# Patient Record
Sex: MALE | Race: WHITE | NOT HISPANIC OR LATINO
[De-identification: names, ages, dates, MRNs, and addresses within clinical notes are randomized per-mention and may not be internally consistent; named-entity substitution may affect disease eponyms.]

---

## 2017-02-17 ENCOUNTER — TRANSCRIPTION ENCOUNTER (OUTPATIENT)
Age: 62
End: 2017-02-17

## 2017-02-17 ENCOUNTER — INPATIENT (INPATIENT)
Facility: HOSPITAL | Age: 62
LOS: 3 days | Discharge: HOME | DRG: 920 | End: 2017-02-21
Attending: SPECIALIST | Admitting: SPECIALIST
Payer: MEDICARE

## 2017-02-17 VITALS
TEMPERATURE: 98 F | WEIGHT: 199.96 LBS | DIASTOLIC BLOOD PRESSURE: 89 MMHG | SYSTOLIC BLOOD PRESSURE: 155 MMHG | HEART RATE: 74 BPM | RESPIRATION RATE: 18 BRPM | OXYGEN SATURATION: 97 %

## 2017-02-17 DIAGNOSIS — F32.9 MAJOR DEPRESSIVE DISORDER, SINGLE EPISODE, UNSPECIFIED: ICD-10-CM

## 2017-02-17 DIAGNOSIS — K21.9 GASTRO-ESOPHAGEAL REFLUX DISEASE WITHOUT ESOPHAGITIS: ICD-10-CM

## 2017-02-17 DIAGNOSIS — T24.309A BURN OF THIRD DEGREE OF UNSPECIFIED SITE OF UNSPECIFIED LOWER LIMB, EXCEPT ANKLE AND FOOT, INITIAL ENCOUNTER: Chronic | ICD-10-CM

## 2017-02-17 DIAGNOSIS — L03.115 CELLULITIS OF RIGHT LOWER LIMB: ICD-10-CM

## 2017-02-17 DIAGNOSIS — E78.00 PURE HYPERCHOLESTEROLEMIA, UNSPECIFIED: ICD-10-CM

## 2017-02-17 DIAGNOSIS — R63.8 OTHER SYMPTOMS AND SIGNS CONCERNING FOOD AND FLUID INTAKE: ICD-10-CM

## 2017-02-17 DIAGNOSIS — T14.8 OTHER INJURY OF UNSPECIFIED BODY REGION: Chronic | ICD-10-CM
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
## 2017-02-17 DIAGNOSIS — I10 ESSENTIAL (PRIMARY) HYPERTENSION: ICD-10-CM

## 2017-02-17 DIAGNOSIS — Z29.9 ENCOUNTER FOR PROPHYLACTIC MEASURES, UNSPECIFIED: ICD-10-CM

## 2017-02-17 LAB
ALBUMIN SERPL ELPH-MCNC: 3.9 G/DL — SIGNIFICANT CHANGE UP (ref 3.4–5)
ALP SERPL-CCNC: 83 U/L — SIGNIFICANT CHANGE UP (ref 40–120)
ALT FLD-CCNC: 29 U/L — SIGNIFICANT CHANGE UP (ref 12–42)
ANION GAP SERPL CALC-SCNC: 12 MMOL/L — SIGNIFICANT CHANGE UP (ref 9–16)
AST SERPL-CCNC: 15 U/L — SIGNIFICANT CHANGE UP (ref 15–37)
BASOPHILS NFR BLD AUTO: 0.3 % — SIGNIFICANT CHANGE UP (ref 0–2)
BILIRUB SERPL-MCNC: 0.5 MG/DL — SIGNIFICANT CHANGE UP (ref 0.2–1.2)
BUN SERPL-MCNC: 13 MG/DL — SIGNIFICANT CHANGE UP (ref 7–23)
CALCIUM SERPL-MCNC: 9 MG/DL — SIGNIFICANT CHANGE UP (ref 8.5–10.5)
CHLORIDE SERPL-SCNC: 105 MMOL/L — SIGNIFICANT CHANGE UP (ref 96–108)
CO2 SERPL-SCNC: 24 MMOL/L — SIGNIFICANT CHANGE UP (ref 22–31)
CREAT SERPL-MCNC: 0.78 MG/DL — SIGNIFICANT CHANGE UP (ref 0.5–1.3)
EOSINOPHIL NFR BLD AUTO: 2.4 % — SIGNIFICANT CHANGE UP (ref 0–6)
EXTRA BLUE TOP TUBE: SIGNIFICANT CHANGE UP
EXTRA SST TUBE: SIGNIFICANT CHANGE UP
GLUCOSE SERPL-MCNC: 109 MG/DL — HIGH (ref 70–99)
HCT VFR BLD CALC: 45.4 % — SIGNIFICANT CHANGE UP (ref 39–50)
HGB BLD-MCNC: 15.4 G/DL — SIGNIFICANT CHANGE UP (ref 13–17)
LYMPHOCYTES # BLD AUTO: 22.2 % — SIGNIFICANT CHANGE UP (ref 13–44)
MCHC RBC-ENTMCNC: 31.4 PG — SIGNIFICANT CHANGE UP (ref 27–34)
MCHC RBC-ENTMCNC: 33.9 G/DL — SIGNIFICANT CHANGE UP (ref 32–36)
MCV RBC AUTO: 92.5 FL — SIGNIFICANT CHANGE UP (ref 80–100)
MONOCYTES NFR BLD AUTO: 10.6 % — SIGNIFICANT CHANGE UP (ref 2–14)
NEUTROPHILS NFR BLD AUTO: 64.5 % — SIGNIFICANT CHANGE UP (ref 43–77)
PLATELET # BLD AUTO: 226 K/UL — SIGNIFICANT CHANGE UP (ref 150–400)
POTASSIUM SERPL-MCNC: 3.9 MMOL/L — SIGNIFICANT CHANGE UP (ref 3.5–5.3)
POTASSIUM SERPL-SCNC: 3.9 MMOL/L — SIGNIFICANT CHANGE UP (ref 3.5–5.3)
PROT SERPL-MCNC: 7.5 G/DL — SIGNIFICANT CHANGE UP (ref 6.4–8.2)
RBC # BLD: 4.91 M/UL — SIGNIFICANT CHANGE UP (ref 4.2–5.8)
RBC # FLD: 14.2 % — SIGNIFICANT CHANGE UP (ref 10.3–16.9)
SODIUM SERPL-SCNC: 141 MMOL/L — SIGNIFICANT CHANGE UP (ref 135–145)
WBC # BLD: 10.8 K/UL — HIGH (ref 3.8–10.5)
WBC # FLD AUTO: 10.8 K/UL — HIGH (ref 3.8–10.5)

## 2017-02-17 PROCEDURE — 99285 EMERGENCY DEPT VISIT HI MDM: CPT

## 2017-02-17 PROCEDURE — 93971 EXTREMITY STUDY: CPT | Mod: 26,RT

## 2017-02-17 RX ORDER — HEPARIN SODIUM 5000 [USP'U]/ML
5000 INJECTION INTRAVENOUS; SUBCUTANEOUS EVERY 8 HOURS
Qty: 0 | Refills: 0 | Status: DISCONTINUED | OUTPATIENT
Start: 2017-02-17 | End: 2017-02-21

## 2017-02-17 RX ORDER — CEFAZOLIN SODIUM 1 G
1000 VIAL (EA) INJECTION EVERY 8 HOURS
Qty: 0 | Refills: 0 | Status: DISCONTINUED | OUTPATIENT
Start: 2017-02-17 | End: 2017-02-21

## 2017-02-17 RX ORDER — TRAZODONE HCL 50 MG
100 TABLET ORAL AT BEDTIME
Qty: 0 | Refills: 0 | Status: DISCONTINUED | OUTPATIENT
Start: 2017-02-17 | End: 2017-02-21

## 2017-02-17 RX ORDER — PANTOPRAZOLE SODIUM 20 MG/1
40 TABLET, DELAYED RELEASE ORAL
Qty: 0 | Refills: 0 | Status: DISCONTINUED | OUTPATIENT
Start: 2017-02-17 | End: 2017-02-21

## 2017-02-17 RX ORDER — ATORVASTATIN CALCIUM 80 MG/1
10 TABLET, FILM COATED ORAL AT BEDTIME
Qty: 0 | Refills: 0 | Status: DISCONTINUED | OUTPATIENT
Start: 2017-02-17 | End: 2017-02-21

## 2017-02-17 RX ORDER — DOXAZOSIN MESYLATE 4 MG
2 TABLET ORAL AT BEDTIME
Qty: 0 | Refills: 0 | Status: DISCONTINUED | OUTPATIENT
Start: 2017-02-17 | End: 2017-02-21

## 2017-02-17 RX ORDER — DIVALPROEX SODIUM 500 MG/1
500 TABLET, DELAYED RELEASE ORAL EVERY 8 HOURS
Qty: 0 | Refills: 0 | Status: DISCONTINUED | OUTPATIENT
Start: 2017-02-17 | End: 2017-02-21

## 2017-02-17 RX ORDER — LISINOPRIL 2.5 MG/1
5 TABLET ORAL DAILY
Qty: 0 | Refills: 0 | Status: DISCONTINUED | OUTPATIENT
Start: 2017-02-17 | End: 2017-02-21

## 2017-02-17 RX ORDER — OXYCODONE HYDROCHLORIDE 5 MG/1
1 TABLET ORAL
Qty: 0 | Refills: 0 | DISCHARGE
Start: 2017-02-17

## 2017-02-17 RX ORDER — FAMOTIDINE 10 MG/ML
20 INJECTION INTRAVENOUS AT BEDTIME
Qty: 0 | Refills: 0 | Status: DISCONTINUED | OUTPATIENT
Start: 2017-02-17 | End: 2017-02-21

## 2017-02-17 RX ORDER — CEFAZOLIN SODIUM 1 G
1000 VIAL (EA) INJECTION ONCE
Qty: 0 | Refills: 0 | Status: COMPLETED | OUTPATIENT
Start: 2017-02-17 | End: 2017-02-17

## 2017-02-17 RX ORDER — CEPHALEXIN 500 MG
1 CAPSULE ORAL
Qty: 20 | Refills: 0
Start: 2017-02-17 | End: 2017-02-27

## 2017-02-17 RX ORDER — ALLOPURINOL 300 MG
300 TABLET ORAL DAILY
Qty: 0 | Refills: 0 | Status: DISCONTINUED | OUTPATIENT
Start: 2017-02-17 | End: 2017-02-21

## 2017-02-17 RX ADMIN — Medication 100 MILLIGRAM(S): at 13:04

## 2017-02-17 RX ADMIN — Medication 100 MILLIGRAM(S): at 21:27

## 2017-02-17 RX ADMIN — HEPARIN SODIUM 5000 UNIT(S): 5000 INJECTION INTRAVENOUS; SUBCUTANEOUS at 21:27

## 2017-02-17 RX ADMIN — PANTOPRAZOLE SODIUM 40 MILLIGRAM(S): 20 TABLET, DELAYED RELEASE ORAL at 19:07

## 2017-02-17 RX ADMIN — FAMOTIDINE 20 MILLIGRAM(S): 10 INJECTION INTRAVENOUS at 21:27

## 2017-02-17 RX ADMIN — Medication 300 MILLIGRAM(S): at 19:08

## 2017-02-17 RX ADMIN — HEPARIN SODIUM 5000 UNIT(S): 5000 INJECTION INTRAVENOUS; SUBCUTANEOUS at 15:06

## 2017-02-17 RX ADMIN — ATORVASTATIN CALCIUM 10 MILLIGRAM(S): 80 TABLET, FILM COATED ORAL at 21:27

## 2017-02-17 RX ADMIN — DIVALPROEX SODIUM 500 MILLIGRAM(S): 500 TABLET, DELAYED RELEASE ORAL at 19:07

## 2017-02-17 RX ADMIN — LISINOPRIL 5 MILLIGRAM(S): 2.5 TABLET ORAL at 15:06

## 2017-02-17 RX ADMIN — Medication 2 MILLIGRAM(S): at 22:01
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
## 2017-02-17 NOTE — DISCHARGE NOTE ADULT - MEDICATION SUMMARY - MEDICATIONS TO TAKE
I will START or STAY ON the medications listed below when I get home from the hospital:    acetaminophen-oxyCODONE 325 mg-5 mg oral tablet  -- 1 tab(s) by mouth every 6 hours, As needed, Severe Pain (7 - 10)  -- Indication: For Pain    lisinopril 5 mg oral tablet  -- 1 tab(s) by mouth once a day  -- Indication: For Hypertension    doxazosin 2 mg oral tablet  -- 1 tab(s) by mouth once a day  -- Indication: For BPH    divalproex sodium 500 mg oral delayed release tablet  -- 1 tab(s) by mouth 3 times a day  -- Indication: For Depression    traZODone 100 mg oral tablet  --  by mouth 2 times a day  -- Indication: For Depression    allopurinol 300 mg oral tablet  -- 1 tab(s) by mouth once a day  -- Indication: For Gout    simvastatin 10 mg oral tablet  -- 1 tab(s) by mouth once a day (at bedtime)  -- Indication: For Hypercholesteremia    Keflex 750 mg oral capsule  -- 1 cap(s) by mouth 2 times a day  -- Finish all this medication unless otherwise directed by prescriber.    -- Indication: For CELLULITIS OF LEG    hydroCHLOROthiazide 25 mg oral tablet  -- 1 tab(s) by mouth once a day  -- Indication: For Hypertension    raNITIdine 150 mg oral capsule  -- 1 cap(s) by mouth once a day  -- Indication: For GERD (gastroesophageal reflux disease)    omeprazole 20 mg oral delayed release capsule  -- 1 cap(s) by mouth once a day  -- Indication: For GERD (gastroesophageal reflux disease)
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
## 2017-02-17 NOTE — DISCHARGE NOTE ADULT - CARE PLAN
Principal Discharge DX:	Cellulitis of leg, right  Goal:	eliminate infection  Instructions for follow-up, activity and diet:	please take your antibiotics as directed and followup outpatient with your primary care doctor. Principal Discharge DX:	Cellulitis of leg, right  Goal:	eliminate infection- Continue antibiotics  Instructions for follow-up, activity and diet:	please take your antibiotics as directed and followup outpatient with your primary care doctor.  You have an appointment on Friday with Dr. Car.

## 2017-02-17 NOTE — ED ADULT TRIAGE NOTE - CHIEF COMPLAINT QUOTE
LLE redness and swelling x 4 days.  Sent in by Dr. Pacheco.  Denies sob, chest pain, fever, chills, n/v/d.

## 2017-02-17 NOTE — DISCHARGE NOTE ADULT - CARE PROVIDER_API CALL
Shaye Car), Cardiovascular Medicine  [address] [place] [address]  Phone: [phone]  Fax: [phone]
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
## 2017-02-17 NOTE — ED PROVIDER NOTE - PROGRESS NOTE DETAILS
Pt w/o change in hr after iv metoprolol; po held and pt given cardizem w transient drop in bp and no sig change in hr.  BP improved w bolus.  Will start cardizem gtt and reassess; pt discussed w cardiology fellow who recommended cardizem gtt and switch to digoxin if bp low.  Pt's cardiologist Dr Rene myers to discuss. Will hold on heparin until discuss w him. Pt discussed w Dr Car - agrees w plan for labs, abx, doppler and pt tba to her for further iv abx.

## 2017-02-17 NOTE — ED ADULT NURSE NOTE - PMH
Depression    GERD (gastroesophageal reflux disease)    Gout    HTN (hypertension)    Hypercholesteremia

## 2017-02-17 NOTE — DISCHARGE NOTE ADULT - PATIENT PORTAL LINK FT
“You can access the [facility] Patient Portal, offered by [facility], by registering with the following website: [email or web address]”

## 2017-02-17 NOTE — ED PROVIDER NOTE - MUSCULOSKELETAL, MLM
rle 2+ edema, warm, erythema, ttp, no drainage noted, dp 1+ bilat, lle nl; Spine appears normal, range of motion is not limited, no muscle or joint tenderness
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
## 2017-02-17 NOTE — H&P ADULT. - EXTREMITIES COMMENTS
RLE - swollen, warm, tender, blanching erythema from foot to below knee. scattered flaccid bullae. No purulence  LLE - pitting edema. WWP

## 2017-02-17 NOTE — ED PROVIDER NOTE - MEDICAL DECISION MAKING DETAILS
Pt c/o 2 wk RLE swelling and redness w/o fever, appears like cellulitis on exam - will treat w iv abx and check labs, also poss dvt - will check doppler.  Likely admit for iv abx.
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
## 2017-02-17 NOTE — H&P ADULT. - SUBSTANCE USE COMMENT, PROFILE
former tobacco smoker, quit 27 years ago. former alcoholic, sober for 27 years. no illicit drugs use.

## 2017-02-17 NOTE — DISCHARGE NOTE ADULT - NS AS ACTIVITY OBS
Sex allowed/Driving allowed/Walking-Indoors allowed/Return to Work/School allowed/Walking-Outdoors allowed/Stairs allowed/Bathing allowed/Showering allowed

## 2017-02-17 NOTE — DISCHARGE NOTE ADULT - PLAN OF CARE
eliminate infection please take your antibiotics as directed and followup outpatient with your primary care doctor. eliminate infection- Continue antibiotics please take your antibiotics as directed and followup outpatient with your primary care doctor.  You have an appointment on Friday with Dr. Car.

## 2017-02-17 NOTE — ED PROVIDER NOTE - OBJECTIVE STATEMENT
60 yo male h/o htn, hld, gout c/o RLE pain, swelling, redness, intermittent drainage from varicose areas x 2 wk.  No trauma, immobilization, h/o pe/dvt, cp, palpitations, sob.  Pt saw pmd yest and was instructed to come to ed for eval and iv abx.

## 2017-02-18 LAB
ALBUMIN SERPL ELPH-MCNC: 3.7 G/DL — SIGNIFICANT CHANGE UP (ref 3.4–5)
ALP SERPL-CCNC: 81 U/L — SIGNIFICANT CHANGE UP (ref 40–120)
ALT FLD-CCNC: 29 U/L — SIGNIFICANT CHANGE UP (ref 12–42)
ANION GAP SERPL CALC-SCNC: 9 MMOL/L — SIGNIFICANT CHANGE UP (ref 9–16)
AST SERPL-CCNC: 20 U/L — SIGNIFICANT CHANGE UP (ref 15–37)
BASOPHILS NFR BLD AUTO: 0.5 % — SIGNIFICANT CHANGE UP (ref 0–2)
BILIRUB SERPL-MCNC: 0.7 MG/DL — SIGNIFICANT CHANGE UP (ref 0.2–1.2)
BUN SERPL-MCNC: 12 MG/DL — SIGNIFICANT CHANGE UP (ref 7–23)
CALCIUM SERPL-MCNC: 9.5 MG/DL — SIGNIFICANT CHANGE UP (ref 8.5–10.5)
CHLORIDE SERPL-SCNC: 101 MMOL/L — SIGNIFICANT CHANGE UP (ref 96–108)
CO2 SERPL-SCNC: 30 MMOL/L — SIGNIFICANT CHANGE UP (ref 22–31)
CREAT SERPL-MCNC: 0.93 MG/DL — SIGNIFICANT CHANGE UP (ref 0.5–1.3)
EOSINOPHIL NFR BLD AUTO: 3.8 % — SIGNIFICANT CHANGE UP (ref 0–6)
GLUCOSE SERPL-MCNC: 81 MG/DL — SIGNIFICANT CHANGE UP (ref 70–99)
HBA1C BLD-MCNC: 5.6 % — SIGNIFICANT CHANGE UP (ref 4.8–5.6)
HCT VFR BLD CALC: 46.2 % — SIGNIFICANT CHANGE UP (ref 39–50)
HGB BLD-MCNC: 15.2 G/DL — SIGNIFICANT CHANGE UP (ref 13–17)
LYMPHOCYTES # BLD AUTO: 30.7 % — SIGNIFICANT CHANGE UP (ref 13–44)
MAGNESIUM SERPL-MCNC: 2.2 MG/DL — SIGNIFICANT CHANGE UP (ref 1.6–2.4)
MCHC RBC-ENTMCNC: 30.7 PG — SIGNIFICANT CHANGE UP (ref 27–34)
MCHC RBC-ENTMCNC: 32.9 G/DL — SIGNIFICANT CHANGE UP (ref 32–36)
MCV RBC AUTO: 93.3 FL — SIGNIFICANT CHANGE UP (ref 80–100)
MONOCYTES NFR BLD AUTO: 10 % — SIGNIFICANT CHANGE UP (ref 2–14)
NEUTROPHILS NFR BLD AUTO: 55 % — SIGNIFICANT CHANGE UP (ref 43–77)
PLATELET # BLD AUTO: 225 K/UL — SIGNIFICANT CHANGE UP (ref 150–400)
POTASSIUM SERPL-MCNC: 3.9 MMOL/L — SIGNIFICANT CHANGE UP (ref 3.5–5.3)
POTASSIUM SERPL-SCNC: 3.9 MMOL/L — SIGNIFICANT CHANGE UP (ref 3.5–5.3)
PROT SERPL-MCNC: 7.3 G/DL — SIGNIFICANT CHANGE UP (ref 6.4–8.2)
RBC # BLD: 4.95 M/UL — SIGNIFICANT CHANGE UP (ref 4.2–5.8)
RBC # FLD: 14.4 % — SIGNIFICANT CHANGE UP (ref 10.3–16.9)
SODIUM SERPL-SCNC: 140 MMOL/L — SIGNIFICANT CHANGE UP (ref 135–145)
WBC # BLD: 8.8 K/UL — SIGNIFICANT CHANGE UP (ref 3.8–10.5)
WBC # FLD AUTO: 8.8 K/UL — SIGNIFICANT CHANGE UP (ref 3.8–10.5)

## 2017-02-18 RX ADMIN — ATORVASTATIN CALCIUM 10 MILLIGRAM(S): 80 TABLET, FILM COATED ORAL at 21:30

## 2017-02-18 RX ADMIN — DIVALPROEX SODIUM 500 MILLIGRAM(S): 500 TABLET, DELAYED RELEASE ORAL at 09:23

## 2017-02-18 RX ADMIN — DIVALPROEX SODIUM 500 MILLIGRAM(S): 500 TABLET, DELAYED RELEASE ORAL at 02:04

## 2017-02-18 RX ADMIN — Medication 100 MILLIGRAM(S): at 14:09

## 2017-02-18 RX ADMIN — Medication 100 MILLIGRAM(S): at 05:35

## 2017-02-18 RX ADMIN — HEPARIN SODIUM 5000 UNIT(S): 5000 INJECTION INTRAVENOUS; SUBCUTANEOUS at 05:35

## 2017-02-18 RX ADMIN — PANTOPRAZOLE SODIUM 40 MILLIGRAM(S): 20 TABLET, DELAYED RELEASE ORAL at 07:08

## 2017-02-18 RX ADMIN — Medication 100 MILLIGRAM(S): at 21:31

## 2017-02-18 RX ADMIN — LISINOPRIL 5 MILLIGRAM(S): 2.5 TABLET ORAL at 05:35

## 2017-02-18 RX ADMIN — HEPARIN SODIUM 5000 UNIT(S): 5000 INJECTION INTRAVENOUS; SUBCUTANEOUS at 14:07

## 2017-02-18 RX ADMIN — Medication 300 MILLIGRAM(S): at 14:07

## 2017-02-18 RX ADMIN — HEPARIN SODIUM 5000 UNIT(S): 5000 INJECTION INTRAVENOUS; SUBCUTANEOUS at 21:30

## 2017-02-18 RX ADMIN — Medication 2 MILLIGRAM(S): at 21:30

## 2017-02-18 RX ADMIN — DIVALPROEX SODIUM 500 MILLIGRAM(S): 500 TABLET, DELAYED RELEASE ORAL at 17:28

## 2017-02-18 RX ADMIN — FAMOTIDINE 20 MILLIGRAM(S): 10 INJECTION INTRAVENOUS at 21:30
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
## 2017-02-19 LAB
ANION GAP SERPL CALC-SCNC: 9 MMOL/L — SIGNIFICANT CHANGE UP (ref 9–16)
BUN SERPL-MCNC: 15 MG/DL — SIGNIFICANT CHANGE UP (ref 7–23)
CALCIUM SERPL-MCNC: 9 MG/DL — SIGNIFICANT CHANGE UP (ref 8.5–10.5)
CHLORIDE SERPL-SCNC: 102 MMOL/L — SIGNIFICANT CHANGE UP (ref 96–108)
CO2 SERPL-SCNC: 29 MMOL/L — SIGNIFICANT CHANGE UP (ref 22–31)
CREAT SERPL-MCNC: 1 MG/DL — SIGNIFICANT CHANGE UP (ref 0.5–1.3)
GLUCOSE SERPL-MCNC: 87 MG/DL — SIGNIFICANT CHANGE UP (ref 70–99)
HCT VFR BLD CALC: 47.5 % — SIGNIFICANT CHANGE UP (ref 39–50)
HGB BLD-MCNC: 16.3 G/DL — SIGNIFICANT CHANGE UP (ref 13–17)
MAGNESIUM SERPL-MCNC: 2.2 MG/DL — SIGNIFICANT CHANGE UP (ref 1.6–2.4)
MCHC RBC-ENTMCNC: 31.8 PG — SIGNIFICANT CHANGE UP (ref 27–34)
MCHC RBC-ENTMCNC: 34.3 G/DL — SIGNIFICANT CHANGE UP (ref 32–36)
MCV RBC AUTO: 92.6 FL — SIGNIFICANT CHANGE UP (ref 80–100)
PLATELET # BLD AUTO: 205 K/UL — SIGNIFICANT CHANGE UP (ref 150–400)
POTASSIUM SERPL-MCNC: 3.8 MMOL/L — SIGNIFICANT CHANGE UP (ref 3.5–5.3)
POTASSIUM SERPL-SCNC: 3.8 MMOL/L — SIGNIFICANT CHANGE UP (ref 3.5–5.3)
RBC # BLD: 5.13 M/UL — SIGNIFICANT CHANGE UP (ref 4.2–5.8)
RBC # FLD: 14.3 % — SIGNIFICANT CHANGE UP (ref 10.3–16.9)
SODIUM SERPL-SCNC: 140 MMOL/L — SIGNIFICANT CHANGE UP (ref 135–145)
WBC # BLD: 8.6 K/UL — SIGNIFICANT CHANGE UP (ref 3.8–10.5)
WBC # FLD AUTO: 8.6 K/UL — SIGNIFICANT CHANGE UP (ref 3.8–10.5)

## 2017-02-19 RX ADMIN — ATORVASTATIN CALCIUM 10 MILLIGRAM(S): 80 TABLET, FILM COATED ORAL at 22:16

## 2017-02-19 RX ADMIN — FAMOTIDINE 20 MILLIGRAM(S): 10 INJECTION INTRAVENOUS at 22:17

## 2017-02-19 RX ADMIN — DIVALPROEX SODIUM 500 MILLIGRAM(S): 500 TABLET, DELAYED RELEASE ORAL at 17:49

## 2017-02-19 RX ADMIN — HEPARIN SODIUM 5000 UNIT(S): 5000 INJECTION INTRAVENOUS; SUBCUTANEOUS at 13:44

## 2017-02-19 RX ADMIN — HEPARIN SODIUM 5000 UNIT(S): 5000 INJECTION INTRAVENOUS; SUBCUTANEOUS at 05:27

## 2017-02-19 RX ADMIN — Medication 100 MILLIGRAM(S): at 13:44

## 2017-02-19 RX ADMIN — Medication 300 MILLIGRAM(S): at 12:48

## 2017-02-19 RX ADMIN — Medication 100 MILLIGRAM(S): at 22:16

## 2017-02-19 RX ADMIN — DIVALPROEX SODIUM 500 MILLIGRAM(S): 500 TABLET, DELAYED RELEASE ORAL at 10:12

## 2017-02-19 RX ADMIN — LISINOPRIL 5 MILLIGRAM(S): 2.5 TABLET ORAL at 05:27

## 2017-02-19 RX ADMIN — Medication 100 MILLIGRAM(S): at 05:27

## 2017-02-19 RX ADMIN — Medication 2 MILLIGRAM(S): at 22:16

## 2017-02-19 RX ADMIN — PANTOPRAZOLE SODIUM 40 MILLIGRAM(S): 20 TABLET, DELAYED RELEASE ORAL at 07:22

## 2017-02-19 RX ADMIN — DIVALPROEX SODIUM 500 MILLIGRAM(S): 500 TABLET, DELAYED RELEASE ORAL at 01:27

## 2017-02-19 RX ADMIN — HEPARIN SODIUM 5000 UNIT(S): 5000 INJECTION INTRAVENOUS; SUBCUTANEOUS at 22:17
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
## 2017-02-19 NOTE — PROGRESS NOTE ADULT - SUBJECTIVE AND OBJECTIVE BOX
INTERVAL HPI/OVERNIGHT EVENTS: patient's legs clinical improving with less erythema in site of cellulitis    VITAL SIGNS:  Vital Signs Last 24 Hrs  T(C): 36.3, Max: 36.8 (02-19 @ 05:21)  T(F): 97.3, Max: 98.3 (02-19 @ 05:21)  HR: 63 (56 - 64)  BP: 112/63 (112/63 - 149/82)  BP(mean): --  RR: 17 (17 - 18)  SpO2: 96% (95% - 97%)    PHYSICAL EXAM:    Constitutional: well appearing, WDWN, NAD  HEENT: NCAT, PEERL, sclera non-icteric  Neck: supple, no JVD  Respiratory: CTAB, no wheezes/rales/rhonchi  Cardiovascular: RRR, normal s1/s2, no MRG  Gastrointestinal: soft, NTND, +BS, no guarding, no organomegaly  Extremities: WWP, DP/radial pulses 2+ b/l, RLE - swollen, warm, tender, blanching erythema from foot to mid calf (previously up to knee), less edema than before  Neurological: AAOx 3, responds to commans, moves all extremities, CN 2-12 intact  Musculoskeletal: 5/5 strength throughout    MEDICATIONS  (STANDING):  ceFAZolin   IVPB 1000milliGRAM(s) IV Intermittent every 8 hours  heparin  Injectable 5000Unit(s) SubCutaneous every 8 hours  allopurinol 300milliGRAM(s) Oral daily  pantoprazole    Tablet 40milliGRAM(s) Oral before breakfast  famotidine    Tablet 20milliGRAM(s) Oral at bedtime  doxazosin 2milliGRAM(s) Oral at bedtime  lisinopril 5milliGRAM(s) Oral daily  hydrochlorothiazide   Tablet 25milliGRAM(s) Oral daily  atorvastatin 10milliGRAM(s) Oral at bedtime  diVALproex ER 500milliGRAM(s) Oral every 8 hours    MEDICATIONS  (PRN):  oxyCODONE  5 mG/acetaminophen 325 mG 1Tablet(s) Oral every 6 hours PRN Severe Pain (7 - 10)  traZODone 100milliGRAM(s) Oral at bedtime PRN Insomnia      Allergies    Haldol (Rash)    Intolerances        LABS:                        16.3   8.6   )-----------( 205      ( 19 Feb 2017 06:33 )             47.5     19 Feb 2017 06:36    140    |  102    |  15     ----------------------------<  87     3.8     |  29     |  1.00     Ca    9.0        19 Feb 2017 06:36  Mg     2.2       19 Feb 2017 06:36    TPro  7.3    /  Alb  3.7    /  TBili  0.7    /  DBili  x      /  AST  20     /  ALT  29     /  AlkPhos  81     18 Feb 2017 07:47          RADIOLOGY & ADDITIONAL TESTS:
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
## 2017-02-20 RX ORDER — ACETAMINOPHEN 500 MG
650 TABLET ORAL EVERY 6 HOURS
Qty: 0 | Refills: 0 | Status: DISCONTINUED | OUTPATIENT
Start: 2017-02-20 | End: 2017-02-21

## 2017-02-20 RX ADMIN — Medication 100 MILLIGRAM(S): at 21:48

## 2017-02-20 RX ADMIN — Medication 100 MILLIGRAM(S): at 12:55

## 2017-02-20 RX ADMIN — FAMOTIDINE 20 MILLIGRAM(S): 10 INJECTION INTRAVENOUS at 21:48

## 2017-02-20 RX ADMIN — PANTOPRAZOLE SODIUM 40 MILLIGRAM(S): 20 TABLET, DELAYED RELEASE ORAL at 06:07

## 2017-02-20 RX ADMIN — Medication 300 MILLIGRAM(S): at 12:55

## 2017-02-20 RX ADMIN — HEPARIN SODIUM 5000 UNIT(S): 5000 INJECTION INTRAVENOUS; SUBCUTANEOUS at 21:48

## 2017-02-20 RX ADMIN — Medication 100 MILLIGRAM(S): at 05:56

## 2017-02-20 RX ADMIN — HEPARIN SODIUM 5000 UNIT(S): 5000 INJECTION INTRAVENOUS; SUBCUTANEOUS at 14:59

## 2017-02-20 RX ADMIN — ATORVASTATIN CALCIUM 10 MILLIGRAM(S): 80 TABLET, FILM COATED ORAL at 21:48

## 2017-02-20 RX ADMIN — DIVALPROEX SODIUM 500 MILLIGRAM(S): 500 TABLET, DELAYED RELEASE ORAL at 09:44

## 2017-02-20 RX ADMIN — HEPARIN SODIUM 5000 UNIT(S): 5000 INJECTION INTRAVENOUS; SUBCUTANEOUS at 05:55

## 2017-02-20 RX ADMIN — DIVALPROEX SODIUM 500 MILLIGRAM(S): 500 TABLET, DELAYED RELEASE ORAL at 17:53

## 2017-02-20 RX ADMIN — LISINOPRIL 5 MILLIGRAM(S): 2.5 TABLET ORAL at 05:55

## 2017-02-20 RX ADMIN — Medication 2 MILLIGRAM(S): at 21:48

## 2017-02-20 RX ADMIN — DIVALPROEX SODIUM 500 MILLIGRAM(S): 500 TABLET, DELAYED RELEASE ORAL at 01:25
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
## 2017-02-21 VITALS
OXYGEN SATURATION: 96 % | SYSTOLIC BLOOD PRESSURE: 137 MMHG | DIASTOLIC BLOOD PRESSURE: 78 MMHG | HEART RATE: 85 BPM | TEMPERATURE: 98 F | RESPIRATION RATE: 18 BRPM

## 2017-02-21 LAB
ANION GAP SERPL CALC-SCNC: 9 MMOL/L — SIGNIFICANT CHANGE UP (ref 9–16)
BUN SERPL-MCNC: 17 MG/DL — SIGNIFICANT CHANGE UP (ref 7–23)
CALCIUM SERPL-MCNC: 9.5 MG/DL — SIGNIFICANT CHANGE UP (ref 8.5–10.5)
CHLORIDE SERPL-SCNC: 100 MMOL/L — SIGNIFICANT CHANGE UP (ref 96–108)
CO2 SERPL-SCNC: 29 MMOL/L — SIGNIFICANT CHANGE UP (ref 22–31)
CREAT SERPL-MCNC: 0.86 MG/DL — SIGNIFICANT CHANGE UP (ref 0.5–1.3)
GLUCOSE SERPL-MCNC: 95 MG/DL — SIGNIFICANT CHANGE UP (ref 70–99)
HCT VFR BLD CALC: 48.1 % — SIGNIFICANT CHANGE UP (ref 39–50)
HGB BLD-MCNC: 16 G/DL — SIGNIFICANT CHANGE UP (ref 13–17)
MAGNESIUM SERPL-MCNC: 2 MG/DL — SIGNIFICANT CHANGE UP (ref 1.6–2.4)
MCHC RBC-ENTMCNC: 30.2 PG — SIGNIFICANT CHANGE UP (ref 27–34)
MCHC RBC-ENTMCNC: 33.3 G/DL — SIGNIFICANT CHANGE UP (ref 32–36)
MCV RBC AUTO: 90.8 FL — SIGNIFICANT CHANGE UP (ref 80–100)
PLATELET # BLD AUTO: 220 K/UL — SIGNIFICANT CHANGE UP (ref 150–400)
POTASSIUM SERPL-MCNC: 3.9 MMOL/L — SIGNIFICANT CHANGE UP (ref 3.5–5.3)
POTASSIUM SERPL-SCNC: 3.9 MMOL/L — SIGNIFICANT CHANGE UP (ref 3.5–5.3)
RBC # BLD: 5.3 M/UL — SIGNIFICANT CHANGE UP (ref 4.2–5.8)
RBC # FLD: 14 % — SIGNIFICANT CHANGE UP (ref 10.3–16.9)
SODIUM SERPL-SCNC: 138 MMOL/L — SIGNIFICANT CHANGE UP (ref 135–145)
WBC # BLD: 9 K/UL — SIGNIFICANT CHANGE UP (ref 3.8–10.5)
WBC # FLD AUTO: 9 K/UL — SIGNIFICANT CHANGE UP (ref 3.8–10.5)

## 2017-02-21 PROCEDURE — 85027 COMPLETE CBC AUTOMATED: CPT

## 2017-02-21 PROCEDURE — 99285 EMERGENCY DEPT VISIT HI MDM: CPT | Mod: 25

## 2017-02-21 PROCEDURE — 93971 EXTREMITY STUDY: CPT

## 2017-02-21 PROCEDURE — 83735 ASSAY OF MAGNESIUM: CPT

## 2017-02-21 PROCEDURE — 80048 BASIC METABOLIC PNL TOTAL CA: CPT

## 2017-02-21 PROCEDURE — 36415 COLL VENOUS BLD VENIPUNCTURE: CPT

## 2017-02-21 PROCEDURE — 87040 BLOOD CULTURE FOR BACTERIA: CPT

## 2017-02-21 PROCEDURE — 97161 PT EVAL LOW COMPLEX 20 MIN: CPT

## 2017-02-21 PROCEDURE — 80053 COMPREHEN METABOLIC PANEL: CPT

## 2017-02-21 PROCEDURE — 83036 HEMOGLOBIN GLYCOSYLATED A1C: CPT

## 2017-02-21 PROCEDURE — 85025 COMPLETE CBC W/AUTO DIFF WBC: CPT

## 2017-02-21 RX ADMIN — Medication 300 MILLIGRAM(S): at 11:10

## 2017-02-21 RX ADMIN — Medication 100 MILLIGRAM(S): at 12:22

## 2017-02-21 RX ADMIN — LISINOPRIL 5 MILLIGRAM(S): 2.5 TABLET ORAL at 05:44

## 2017-02-21 RX ADMIN — DIVALPROEX SODIUM 500 MILLIGRAM(S): 500 TABLET, DELAYED RELEASE ORAL at 02:05

## 2017-02-21 RX ADMIN — PANTOPRAZOLE SODIUM 40 MILLIGRAM(S): 20 TABLET, DELAYED RELEASE ORAL at 07:42

## 2017-02-21 RX ADMIN — DIVALPROEX SODIUM 500 MILLIGRAM(S): 500 TABLET, DELAYED RELEASE ORAL at 10:44

## 2017-02-21 RX ADMIN — Medication 100 MILLIGRAM(S): at 05:44

## 2017-02-21 RX ADMIN — HEPARIN SODIUM 5000 UNIT(S): 5000 INJECTION INTRAVENOUS; SUBCUTANEOUS at 05:44

## 2017-02-21 NOTE — PHYSICAL THERAPY INITIAL EVALUATION ADULT - ADDITIONAL COMMENTS
Pt. reports living in an elevator building and using a cane vs. single crutch for ambulation prior to admission.

## 2017-02-22 LAB
CULTURE RESULTS: SIGNIFICANT CHANGE UP
CULTURE RESULTS: SIGNIFICANT CHANGE UP
SPECIMEN SOURCE: SIGNIFICANT CHANGE UP
SPECIMEN SOURCE: SIGNIFICANT CHANGE UP

## 2017-02-23 DIAGNOSIS — L03.115 CELLULITIS OF RIGHT LOWER LIMB: ICD-10-CM

## 2017-02-23 DIAGNOSIS — I10 ESSENTIAL (PRIMARY) HYPERTENSION: ICD-10-CM

## 2017-02-23 DIAGNOSIS — Z88.8 ALLERGY STATUS TO OTHER DRUGS, MEDICAMENTS AND BIOLOGICAL SUBSTANCES STATUS: ICD-10-CM

## 2017-02-23 DIAGNOSIS — E78.5 HYPERLIPIDEMIA, UNSPECIFIED: ICD-10-CM

## 2017-02-23 DIAGNOSIS — M79.89 OTHER SPECIFIED SOFT TISSUE DISORDERS: ICD-10-CM

## 2017-02-23 DIAGNOSIS — M10.9 GOUT, UNSPECIFIED: ICD-10-CM

## 2017-02-23 DIAGNOSIS — Z87.891 PERSONAL HISTORY OF NICOTINE DEPENDENCE: ICD-10-CM

## 2017-02-23 DIAGNOSIS — K21.9 GASTRO-ESOPHAGEAL REFLUX DISEASE WITHOUT ESOPHAGITIS: ICD-10-CM

## 2017-02-23 DIAGNOSIS — M21.372 FOOT DROP, LEFT FOOT: ICD-10-CM

## 2017-02-23 DIAGNOSIS — L03.116 CELLULITIS OF LEFT LOWER LIMB: ICD-10-CM

## 2017-02-23 DIAGNOSIS — F32.9 MAJOR DEPRESSIVE DISORDER, SINGLE EPISODE, UNSPECIFIED: ICD-10-CM

## 2017-02-24 PROBLEM — K21.9 GASTRO-ESOPHAGEAL REFLUX DISEASE WITHOUT ESOPHAGITIS: Chronic | Status: ACTIVE | Noted: 2017-02-17

## 2017-02-24 PROBLEM — E78.00 PURE HYPERCHOLESTEROLEMIA, UNSPECIFIED: Chronic | Status: ACTIVE | Noted: 2017-02-17

## 2017-02-24 PROBLEM — F32.9 MAJOR DEPRESSIVE DISORDER, SINGLE EPISODE, UNSPECIFIED: Chronic | Status: ACTIVE | Noted: 2017-02-17

## 2017-02-24 PROBLEM — I10 ESSENTIAL (PRIMARY) HYPERTENSION: Chronic | Status: ACTIVE | Noted: 2017-02-17

## 2017-02-24 PROBLEM — M10.9 GOUT, UNSPECIFIED: Chronic | Status: ACTIVE | Noted: 2017-02-17

## 2017-02-27 DIAGNOSIS — T86.822 SKIN GRAFT (ALLOGRAFT) (AUTOGRAFT) INFECTION: ICD-10-CM

## 2017-02-27 DIAGNOSIS — T85.693A OTHER MECHANICAL COMPLICATION OF ARTIFICIAL SKIN GRAFT AND DECELLULARIZED ALLODERMIS, INITIAL ENCOUNTER: ICD-10-CM

## 2017-03-10 ENCOUNTER — APPOINTMENT (OUTPATIENT)
Dept: VASCULAR SURGERY | Facility: CLINIC | Age: 62
End: 2017-03-10

## 2017-03-24 ENCOUNTER — APPOINTMENT (OUTPATIENT)
Dept: VASCULAR SURGERY | Facility: CLINIC | Age: 62
End: 2017-03-24

## 2017-03-24 VITALS — SYSTOLIC BLOOD PRESSURE: 130 MMHG | DIASTOLIC BLOOD PRESSURE: 80 MMHG | OXYGEN SATURATION: 92 % | HEART RATE: 71 BPM

## 2017-03-24 RX ORDER — SULFAMETHOXAZOLE AND TRIMETHOPRIM 800; 160 MG/1; MG/1
800-160 TABLET ORAL TWICE DAILY
Qty: 28 | Refills: 0 | Status: DISCONTINUED | COMMUNITY
Start: 2017-03-10 | End: 2017-03-24

## 2017-04-07 ENCOUNTER — APPOINTMENT (OUTPATIENT)
Dept: VASCULAR SURGERY | Facility: CLINIC | Age: 62
End: 2017-04-07

## 2017-04-07 RX ORDER — CIPROFLOXACIN HYDROCHLORIDE 500 MG/1
500 TABLET, FILM COATED ORAL DAILY
Qty: 30 | Refills: 0 | Status: DISCONTINUED | COMMUNITY
Start: 2017-03-24 | End: 2017-04-07

## 2017-05-19 ENCOUNTER — APPOINTMENT (OUTPATIENT)
Dept: VASCULAR SURGERY | Facility: CLINIC | Age: 62
End: 2017-05-19

## 2017-05-19 VITALS — SYSTOLIC BLOOD PRESSURE: 149 MMHG | DIASTOLIC BLOOD PRESSURE: 92 MMHG | HEART RATE: 72 BPM | OXYGEN SATURATION: 95 %

## 2018-12-28 ENCOUNTER — APPOINTMENT (OUTPATIENT)
Dept: VASCULAR SURGERY | Facility: CLINIC | Age: 63
End: 2018-12-28
Payer: MEDICARE

## 2018-12-28 PROCEDURE — 99214 OFFICE O/P EST MOD 30 MIN: CPT

## 2019-01-11 ENCOUNTER — OUTPATIENT (OUTPATIENT)
Dept: OUTPATIENT SERVICES | Facility: HOSPITAL | Age: 64
LOS: 1 days | End: 2019-01-11
Payer: MEDICARE

## 2019-01-11 ENCOUNTER — APPOINTMENT (OUTPATIENT)
Dept: VASCULAR SURGERY | Facility: CLINIC | Age: 64
End: 2019-01-11
Payer: MEDICARE

## 2019-01-11 DIAGNOSIS — T24.309A BURN OF THIRD DEGREE OF UNSPECIFIED SITE OF UNSPECIFIED LOWER LIMB, EXCEPT ANKLE AND FOOT, INITIAL ENCOUNTER: Chronic | ICD-10-CM

## 2019-01-11 DIAGNOSIS — L03.116 CELLULITIS OF LEFT LOWER LIMB: ICD-10-CM

## 2019-01-11 DIAGNOSIS — T14.8 OTHER INJURY OF UNSPECIFIED BODY REGION: Chronic | ICD-10-CM

## 2019-01-11 LAB
ALBUMIN SERPL ELPH-MCNC: 4.1 G/DL — SIGNIFICANT CHANGE UP (ref 3.3–5)
ALP SERPL-CCNC: 85 U/L — SIGNIFICANT CHANGE UP (ref 40–120)
ALT FLD-CCNC: 30 U/L — SIGNIFICANT CHANGE UP (ref 10–45)
ANION GAP SERPL CALC-SCNC: 18 MMOL/L — HIGH (ref 5–17)
APTT BLD: 33.8 SEC — SIGNIFICANT CHANGE UP (ref 27.5–36.3)
AST SERPL-CCNC: 20 U/L — SIGNIFICANT CHANGE UP (ref 10–40)
BASOPHILS NFR BLD AUTO: 0.3 % — SIGNIFICANT CHANGE UP (ref 0–2)
BILIRUB SERPL-MCNC: 0.4 MG/DL — SIGNIFICANT CHANGE UP (ref 0.2–1.2)
BUN SERPL-MCNC: 14 MG/DL — SIGNIFICANT CHANGE UP (ref 7–23)
CALCIUM SERPL-MCNC: 9.2 MG/DL — SIGNIFICANT CHANGE UP (ref 8.4–10.5)
CHLORIDE SERPL-SCNC: 103 MMOL/L — SIGNIFICANT CHANGE UP (ref 96–108)
CO2 SERPL-SCNC: 22 MMOL/L — SIGNIFICANT CHANGE UP (ref 22–31)
CREAT SERPL-MCNC: 1.03 MG/DL — SIGNIFICANT CHANGE UP (ref 0.5–1.3)
EOSINOPHIL NFR BLD AUTO: 1.8 % — SIGNIFICANT CHANGE UP (ref 0–6)
GLUCOSE SERPL-MCNC: 99 MG/DL — SIGNIFICANT CHANGE UP (ref 70–99)
HCT VFR BLD CALC: 46 % — SIGNIFICANT CHANGE UP (ref 39–50)
HGB BLD-MCNC: 15 G/DL — SIGNIFICANT CHANGE UP (ref 13–17)
INR BLD: 1.07 — SIGNIFICANT CHANGE UP (ref 0.88–1.16)
LYMPHOCYTES # BLD AUTO: 25 % — SIGNIFICANT CHANGE UP (ref 13–44)
MCHC RBC-ENTMCNC: 29.8 PG — SIGNIFICANT CHANGE UP (ref 27–34)
MCHC RBC-ENTMCNC: 32.6 G/DL — SIGNIFICANT CHANGE UP (ref 32–36)
MCV RBC AUTO: 91.3 FL — SIGNIFICANT CHANGE UP (ref 80–100)
MONOCYTES NFR BLD AUTO: 9.5 % — SIGNIFICANT CHANGE UP (ref 2–14)
NEUTROPHILS NFR BLD AUTO: 63.4 % — SIGNIFICANT CHANGE UP (ref 43–77)
PLATELET # BLD AUTO: 265 K/UL — SIGNIFICANT CHANGE UP (ref 150–400)
POTASSIUM SERPL-MCNC: 4.2 MMOL/L — SIGNIFICANT CHANGE UP (ref 3.5–5.3)
POTASSIUM SERPL-SCNC: 4.2 MMOL/L — SIGNIFICANT CHANGE UP (ref 3.5–5.3)
PROT SERPL-MCNC: 6.4 G/DL — SIGNIFICANT CHANGE UP (ref 6–8.3)
PROTHROM AB SERPL-ACNC: 12.1 SEC — SIGNIFICANT CHANGE UP (ref 10–12.9)
RBC # BLD: 5.04 M/UL — SIGNIFICANT CHANGE UP (ref 4.2–5.8)
RBC # FLD: 14.2 % — SIGNIFICANT CHANGE UP (ref 10.3–16.9)
SODIUM SERPL-SCNC: 143 MMOL/L — SIGNIFICANT CHANGE UP (ref 135–145)
WBC # BLD: 11.3 K/UL — HIGH (ref 3.8–10.5)
WBC # FLD AUTO: 11.3 K/UL — HIGH (ref 3.8–10.5)

## 2019-01-11 PROCEDURE — 93005 ELECTROCARDIOGRAM TRACING: CPT

## 2019-01-11 PROCEDURE — 99213 OFFICE O/P EST LOW 20 MIN: CPT

## 2019-01-11 PROCEDURE — 85025 COMPLETE CBC W/AUTO DIFF WBC: CPT

## 2019-01-11 PROCEDURE — 80053 COMPREHEN METABOLIC PANEL: CPT

## 2019-01-11 PROCEDURE — 85730 THROMBOPLASTIN TIME PARTIAL: CPT

## 2019-01-11 PROCEDURE — 93010 ELECTROCARDIOGRAM REPORT: CPT

## 2019-01-11 PROCEDURE — 85610 PROTHROMBIN TIME: CPT

## 2019-01-17 ENCOUNTER — APPOINTMENT (OUTPATIENT)
Dept: VASCULAR SURGERY | Facility: CLINIC | Age: 64
End: 2019-01-17
Payer: MEDICARE

## 2019-01-17 PROCEDURE — 99213 OFFICE O/P EST LOW 20 MIN: CPT

## 2019-01-17 RX ORDER — CIPROFLOXACIN HYDROCHLORIDE 250 MG/1
250 TABLET, FILM COATED ORAL
Qty: 30 | Refills: 0 | Status: DISCONTINUED | COMMUNITY
Start: 2017-04-07 | End: 2019-01-17

## 2019-01-17 NOTE — REVIEW OF SYSTEMS
[Fever] : no fever [As noted in HPI] : as noted in HPI [Leg Claudication] : no intermittent leg claudication [Lower Ext Edema] : lower extremity edema [As Noted in HPI] : as noted in HPI [Skin Lesions] : skin lesion [Negative] : Heme/Lymph

## 2019-01-17 NOTE — PHYSICAL EXAM
[JVD] : no jugular venous distention  [Normal Breath Sounds] : Normal breath sounds [Normal Heart Sounds] : normal heart sounds [2+] : left 2+ [Ankle Swelling (On Exam)] : present [Ankle Swelling On The Left] : of the left ankle [Ankle Swelling On The Right] : mild [Varicose Veins Of Lower Extremities] : not present [] : not present [Abdomen Tenderness] : ~T ~M No abdominal tenderness [Skin Ulcer] : ulcer [Alert] : alert [Calm] : calm [de-identified] : WN/WD, NAD [de-identified] : NC/AT [de-identified] : LLE: + 1x2 cm ulcer over lower posterior calf, + mild surrounding erythema, clean, no signs of infection

## 2019-01-17 NOTE — ASSESSMENT
[FreeTextEntry1] : 64 yo M with hx of cellulitis of LE  due to a new abrasion to his left posterior calf  when he accidentally dropped a pan while cooking a week ago\par Patient was recommended to have  wound debridement this week in OR, but he noticed that it's better and decided to cancel the procedure.\par Pt was asked to come to see us in the office today.\par LLE ulcer is stable, getting smaller, less edema and surrounding erythema.\par Patient was recommended to  c/w Abx's and local wound care. [Arterial/Venous Disease] : arterial/venous disease [Ulcer Care] : ulcer care

## 2019-01-17 NOTE — HISTORY OF PRESENT ILLNESS
[FreeTextEntry1] : 62 yo M with hx of cellulitis of LE who was seen last week due toe new abrasion to his left posterior calf  when he accidentally dropped a pan while cooking a week ago. Patient was recommended to apply Silvadene to the area and was started on oral Abx's.  He returned few days ago since he noticed and stated that his wound is not healing and after the evaluation we recommended for him to have wound debridement in OR. Patient called few two days later stating that the scab came off from the ulcer and now it's cleared and he thinks it's healing. He asked if we could cancel his procedure. He denies fever, chills.

## 2019-01-18 NOTE — HISTORY OF PRESENT ILLNESS
[FreeTextEntry1] : 62 yo M with hx of cellulitis of LE who was seen last week due toa new abrasion to his left posterior calf  when he accidentally dropped a pan while cooking a week ago. Patient was recommended to apply Silvadene to the area and was started on oral Abx's. Today he returns since he noticed drainage from the wound and he is afraid it is not healing well. He denies fever, chills.

## 2019-01-18 NOTE — ASSESSMENT
[Ulcer Care] : ulcer care [FreeTextEntry1] : 62 yo M with hx of cellulitis of LE who was seen last week due to a new abrasion to his left posterior calf  when he accidentally dropped a pan while cooking a week ago\par Since his ulcer is not healing well and now it drains clear discharge he was recommended to have wound debridement next week in OR.\par Patient agreed and would like to proceed.\par In a mean time c/w Abx's and local wound care.

## 2019-01-18 NOTE — REVIEW OF SYSTEMS
[As noted in HPI] : as noted in HPI [Lower Ext Edema] : lower extremity edema [As Noted in HPI] : as noted in HPI [Skin Lesions] : skin lesion [Negative] : Heme/Lymph [Fever] : no fever [Leg Claudication] : no intermittent leg claudication

## 2019-01-18 NOTE — PHYSICAL EXAM
[Normal Breath Sounds] : Normal breath sounds [Normal Heart Sounds] : normal heart sounds [2+] : left 2+ [Ankle Swelling (On Exam)] : present [Ankle Swelling On The Left] : of the left ankle [Ankle Swelling On The Right] : mild [Skin Ulcer] : ulcer [Alert] : alert [Calm] : calm [JVD] : no jugular venous distention  [Varicose Veins Of Lower Extremities] : not present [] : not present [Abdomen Tenderness] : ~T ~M No abdominal tenderness [de-identified] : WN/WD, NAD [de-identified] : NC/AT [de-identified] : LLE: + 1x2 cm ulcer over lower posterior calf, + mild surrounding erythema, +exudate

## 2019-01-25 ENCOUNTER — APPOINTMENT (OUTPATIENT)
Dept: VASCULAR SURGERY | Facility: CLINIC | Age: 64
End: 2019-01-25
Payer: MEDICARE

## 2019-01-25 PROCEDURE — 99212 OFFICE O/P EST SF 10 MIN: CPT

## 2019-01-25 NOTE — PHYSICAL EXAM
[Normal Breath Sounds] : Normal breath sounds [Normal Heart Sounds] : normal heart sounds [2+] : left 2+ [Ankle Swelling (On Exam)] : present [Ankle Swelling On The Left] : of the left ankle [Ankle Swelling On The Right] : mild [Skin Ulcer] : ulcer [Alert] : alert [Calm] : calm [JVD] : no jugular venous distention  [Varicose Veins Of Lower Extremities] : not present [] : not present [Abdomen Tenderness] : ~T ~M No abdominal tenderness [de-identified] : WN/WD, NAD [de-identified] : NC/AT [de-identified] : LLE: + 1x0.5 cm ulcer over lower posterior calf, almost healed

## 2019-01-25 NOTE — ASSESSMENT
[Arterial/Venous Disease] : arterial/venous disease [Ulcer Care] : ulcer care [FreeTextEntry1] : 62 yo M with hx of cellulitis of LE who was seen last week due toe new abrasion to his left posterior calf  treated w/Silvadene to the area and oral Abx's returns for a f/u visit. \par LLE ulcer is almost healed, and there is less edema.\par Patient was recommended to  c/w  local wound care.\par If ulcer heals no f/u necessary.\par F/u prn.

## 2019-01-25 NOTE — HISTORY OF PRESENT ILLNESS
[FreeTextEntry1] : 62 yo M with hx of cellulitis of LE who was seen last week due toe new abrasion to his left posterior calf  treated w/Silvadene to the area and oral Abx's returns for a f/u visit.  Patient states that his wound is healing well and decreased in size. He denies fever, chills.

## 2019-08-06 ENCOUNTER — RX RENEWAL (OUTPATIENT)
Age: 64
End: 2019-08-06

## 2019-08-12 ENCOUNTER — RX RENEWAL (OUTPATIENT)
Age: 64
End: 2019-08-12

## 2019-08-29 ENCOUNTER — INPATIENT (INPATIENT)
Facility: HOSPITAL | Age: 64
LOS: 4 days | Discharge: HOME | DRG: 603 | End: 2019-09-03
Attending: SURGERY | Admitting: SURGERY
Payer: MEDICARE

## 2019-08-29 VITALS
HEIGHT: 69 IN | RESPIRATION RATE: 17 BRPM | OXYGEN SATURATION: 97 % | WEIGHT: 229.94 LBS | SYSTOLIC BLOOD PRESSURE: 174 MMHG | DIASTOLIC BLOOD PRESSURE: 87 MMHG | HEART RATE: 66 BPM | TEMPERATURE: 98 F

## 2019-08-29 DIAGNOSIS — T24.309A BURN OF THIRD DEGREE OF UNSPECIFIED SITE OF UNSPECIFIED LOWER LIMB, EXCEPT ANKLE AND FOOT, INITIAL ENCOUNTER: Chronic | ICD-10-CM

## 2019-08-29 DIAGNOSIS — T14.8 OTHER INJURY OF UNSPECIFIED BODY REGION: Chronic | ICD-10-CM

## 2019-08-29 LAB
ALBUMIN SERPL ELPH-MCNC: 4 G/DL — SIGNIFICANT CHANGE UP (ref 3.3–5)
ALP SERPL-CCNC: 61 U/L — SIGNIFICANT CHANGE UP (ref 40–120)
ALT FLD-CCNC: 14 U/L — SIGNIFICANT CHANGE UP (ref 10–45)
ANION GAP SERPL CALC-SCNC: 12 MMOL/L — SIGNIFICANT CHANGE UP (ref 5–17)
APTT BLD: 34.8 SEC — SIGNIFICANT CHANGE UP (ref 27.5–36.3)
AST SERPL-CCNC: 16 U/L — SIGNIFICANT CHANGE UP (ref 10–40)
BASOPHILS # BLD AUTO: 0.04 K/UL — SIGNIFICANT CHANGE UP (ref 0–0.2)
BASOPHILS NFR BLD AUTO: 0.4 % — SIGNIFICANT CHANGE UP (ref 0–2)
BILIRUB SERPL-MCNC: 0.3 MG/DL — SIGNIFICANT CHANGE UP (ref 0.2–1.2)
BUN SERPL-MCNC: 17 MG/DL — SIGNIFICANT CHANGE UP (ref 7–23)
CALCIUM SERPL-MCNC: 9 MG/DL — SIGNIFICANT CHANGE UP (ref 8.4–10.5)
CHLORIDE SERPL-SCNC: 108 MMOL/L — SIGNIFICANT CHANGE UP (ref 96–108)
CO2 SERPL-SCNC: 24 MMOL/L — SIGNIFICANT CHANGE UP (ref 22–31)
CREAT SERPL-MCNC: 0.77 MG/DL — SIGNIFICANT CHANGE UP (ref 0.5–1.3)
EOSINOPHIL # BLD AUTO: 0.33 K/UL — SIGNIFICANT CHANGE UP (ref 0–0.5)
EOSINOPHIL NFR BLD AUTO: 3.3 % — SIGNIFICANT CHANGE UP (ref 0–6)
GLUCOSE SERPL-MCNC: 127 MG/DL — HIGH (ref 70–99)
HCT VFR BLD CALC: 46 % — SIGNIFICANT CHANGE UP (ref 39–50)
HGB BLD-MCNC: 14.9 G/DL — SIGNIFICANT CHANGE UP (ref 13–17)
IMM GRANULOCYTES NFR BLD AUTO: 1.1 % — SIGNIFICANT CHANGE UP (ref 0–1.5)
INR BLD: 1.15 — SIGNIFICANT CHANGE UP (ref 0.88–1.16)
LYMPHOCYTES # BLD AUTO: 2.72 K/UL — SIGNIFICANT CHANGE UP (ref 1–3.3)
LYMPHOCYTES # BLD AUTO: 27.4 % — SIGNIFICANT CHANGE UP (ref 13–44)
MCHC RBC-ENTMCNC: 31 PG — SIGNIFICANT CHANGE UP (ref 27–34)
MCHC RBC-ENTMCNC: 32.4 GM/DL — SIGNIFICANT CHANGE UP (ref 32–36)
MCV RBC AUTO: 95.6 FL — SIGNIFICANT CHANGE UP (ref 80–100)
MONOCYTES # BLD AUTO: 0.86 K/UL — SIGNIFICANT CHANGE UP (ref 0–0.9)
MONOCYTES NFR BLD AUTO: 8.7 % — SIGNIFICANT CHANGE UP (ref 2–14)
NEUTROPHILS # BLD AUTO: 5.86 K/UL — SIGNIFICANT CHANGE UP (ref 1.8–7.4)
NEUTROPHILS NFR BLD AUTO: 59.1 % — SIGNIFICANT CHANGE UP (ref 43–77)
NRBC # BLD: 0 /100 WBCS — SIGNIFICANT CHANGE UP (ref 0–0)
PLATELET # BLD AUTO: 231 K/UL — SIGNIFICANT CHANGE UP (ref 150–400)
POTASSIUM SERPL-MCNC: 4.1 MMOL/L — SIGNIFICANT CHANGE UP (ref 3.5–5.3)
POTASSIUM SERPL-SCNC: 4.1 MMOL/L — SIGNIFICANT CHANGE UP (ref 3.5–5.3)
PROT SERPL-MCNC: 6.8 G/DL — SIGNIFICANT CHANGE UP (ref 6–8.3)
PROTHROM AB SERPL-ACNC: 13.1 SEC — HIGH (ref 10–12.9)
RBC # BLD: 4.81 M/UL — SIGNIFICANT CHANGE UP (ref 4.2–5.8)
RBC # FLD: 13.8 % — SIGNIFICANT CHANGE UP (ref 10.3–14.5)
SODIUM SERPL-SCNC: 144 MMOL/L — SIGNIFICANT CHANGE UP (ref 135–145)
WBC # BLD: 9.92 K/UL — SIGNIFICANT CHANGE UP (ref 3.8–10.5)
WBC # FLD AUTO: 9.92 K/UL — SIGNIFICANT CHANGE UP (ref 3.8–10.5)

## 2019-08-29 PROCEDURE — 93971 EXTREMITY STUDY: CPT | Mod: 26,RT

## 2019-08-29 PROCEDURE — 99285 EMERGENCY DEPT VISIT HI MDM: CPT

## 2019-08-29 RX ORDER — VANCOMYCIN HCL 1 G
1500 VIAL (EA) INTRAVENOUS EVERY 12 HOURS
Refills: 0 | Status: DISCONTINUED | OUTPATIENT
Start: 2019-08-29 | End: 2019-09-03

## 2019-08-29 RX ADMIN — Medication 300 MILLIGRAM(S): at 22:45

## 2019-08-29 NOTE — H&P ADULT - ASSESSMENT
64y.o male with hx of HTN, HLD, BPH, Depression who was referred from Dr Schumacher's office for IV Abx 2/2 to RLE Cellulitis.    -Admit to Vascular-> Dr Schumacher  -IV Abx Vanc/Zosyn  -Home Meds  -Reg diet  -Compression Elevation or RLE  -am labs

## 2019-08-29 NOTE — H&P ADULT - NSHPPHYSICALEXAM_GEN_ALL_CORE
PHYSICAL EXAM:  Constitutional:  Pt AXOX3 in NAD  Respiratory: Unlabored  Cardiovascular: S1S2  Extremities: RLE edema multiple small blisters, erythema below knee to foot, LLE varicosities no erythema  Vascular: 2+ DP b/l  Neurological: intact

## 2019-08-29 NOTE — ED ADULT NURSE NOTE - INTERVENTIONS DEFINITIONS
Instruct patient to call for assistance/Physically safe environment: no spills, clutter or unnecessary equipment/Non-slip footwear when patient is off stretcher/Stretcher in lowest position, wheels locked, appropriate side rails in place/Provide visual cue, wrist band, yellow gown, etc.

## 2019-08-29 NOTE — H&P ADULT - HISTORY OF PRESENT ILLNESS
Pt is 64y.o male with hx of HTN, HLD, BPH, Depression who was referred from Dr Schumacher's office for IV Abx 2/2 to RLE Cellulitis.  Pt states he has swelling, redness and pain x3wks.  Pt was given Augmentin x1 week symptoms did not resolve, pt was then placed on Cipro which also did improve.  Pt denies fever, chills, SOB or CP.

## 2019-08-29 NOTE — ED ADULT TRIAGE NOTE - CHIEF COMPLAINT QUOTE
R leg pain and swelling with Hx cellulitis followed by MD Schumacher, states "I usually call Kathryn at the office and they fix it, but she's on vacation so they told me to come to the emergency room"

## 2019-08-29 NOTE — ED PROVIDER NOTE - OBJECTIVE STATEMENT
here with swelling and redness of his right leg for the past 3 weeks.  States he saw the associate of Dr. Schumacher in the office and was treated with a week of augmentin.  Didn't seem to get better so was switched to cipro.  Still didn't get better so he was sent to the ED.  Denies fever/chills, trauma.  No history of dvt.  Gradual onset.

## 2019-08-29 NOTE — H&P ADULT - NSICDXPASTMEDICALHX_GEN_ALL_CORE_FT
PAST MEDICAL HISTORY:  Depression     GERD (gastroesophageal reflux disease)     Gout     HTN (hypertension)     Hypercholesteremia

## 2019-08-29 NOTE — ED PROVIDER NOTE - MUSCULOSKELETAL, MLM
Spine appears normal, range of motion is not limited, right leg with 3 + edema, mild erythema from below knee to toes.  mild warmth.  no focal tenderness.

## 2019-08-29 NOTE — ED ADULT NURSE NOTE - OBJECTIVE STATEMENT
Patient is a 65yo male reporting increasing pain and swelling to chronic right lower leg cellulitis. Patient states he is usually seen at [facility] office but that his usual nurse is on vacation this week and sent him to the ED. Denies any fevers/chills, ambulating with steady gait.

## 2019-08-29 NOTE — PATIENT PROFILE ADULT - LIVES WITH
[facility]. Per Mercedes Pharmacist at [facility], Chari Sánchez  RN Case Manager  [phone]     alone

## 2019-08-29 NOTE — ED PROVIDER NOTE - CLINICAL SUMMARY MEDICAL DECISION MAKING FREE TEXT BOX
left leg redness/swelling.  concern for dvt but duplex done and neg. failed outpatient treatment with 2 oral antibiotics.  vanco given.  no symptoms of sepsis.  cultures obtained.  vascular consulted, admit for further iv antibiotics

## 2019-08-30 LAB
ANION GAP SERPL CALC-SCNC: 9 MMOL/L — SIGNIFICANT CHANGE UP (ref 5–17)
BASOPHILS # BLD AUTO: 0.06 K/UL — SIGNIFICANT CHANGE UP (ref 0–0.2)
BASOPHILS NFR BLD AUTO: 0.7 % — SIGNIFICANT CHANGE UP (ref 0–2)
BLD GP AB SCN SERPL QL: NEGATIVE — SIGNIFICANT CHANGE UP
BUN SERPL-MCNC: 15 MG/DL — SIGNIFICANT CHANGE UP (ref 7–23)
CALCIUM SERPL-MCNC: 9.2 MG/DL — SIGNIFICANT CHANGE UP (ref 8.4–10.5)
CHLORIDE SERPL-SCNC: 101 MMOL/L — SIGNIFICANT CHANGE UP (ref 96–108)
CO2 SERPL-SCNC: 30 MMOL/L — SIGNIFICANT CHANGE UP (ref 22–31)
CREAT SERPL-MCNC: 0.95 MG/DL — SIGNIFICANT CHANGE UP (ref 0.5–1.3)
EOSINOPHIL # BLD AUTO: 0.31 K/UL — SIGNIFICANT CHANGE UP (ref 0–0.5)
EOSINOPHIL NFR BLD AUTO: 3.7 % — SIGNIFICANT CHANGE UP (ref 0–6)
GLUCOSE SERPL-MCNC: 103 MG/DL — HIGH (ref 70–99)
HCT VFR BLD CALC: 46.1 % — SIGNIFICANT CHANGE UP (ref 39–50)
HCV AB S/CO SERPL IA: 0.13 S/CO — SIGNIFICANT CHANGE UP
HCV AB SERPL-IMP: SIGNIFICANT CHANGE UP
HGB BLD-MCNC: 15.2 G/DL — SIGNIFICANT CHANGE UP (ref 13–17)
IMM GRANULOCYTES NFR BLD AUTO: 1.1 % — SIGNIFICANT CHANGE UP (ref 0–1.5)
LYMPHOCYTES # BLD AUTO: 2.22 K/UL — SIGNIFICANT CHANGE UP (ref 1–3.3)
LYMPHOCYTES # BLD AUTO: 26.3 % — SIGNIFICANT CHANGE UP (ref 13–44)
MAGNESIUM SERPL-MCNC: 1.8 MG/DL — SIGNIFICANT CHANGE UP (ref 1.6–2.6)
MCHC RBC-ENTMCNC: 31.1 PG — SIGNIFICANT CHANGE UP (ref 27–34)
MCHC RBC-ENTMCNC: 33 GM/DL — SIGNIFICANT CHANGE UP (ref 32–36)
MCV RBC AUTO: 94.5 FL — SIGNIFICANT CHANGE UP (ref 80–100)
MONOCYTES # BLD AUTO: 0.92 K/UL — HIGH (ref 0–0.9)
MONOCYTES NFR BLD AUTO: 10.9 % — SIGNIFICANT CHANGE UP (ref 2–14)
NEUTROPHILS # BLD AUTO: 4.83 K/UL — SIGNIFICANT CHANGE UP (ref 1.8–7.4)
NEUTROPHILS NFR BLD AUTO: 57.3 % — SIGNIFICANT CHANGE UP (ref 43–77)
NRBC # BLD: 0 /100 WBCS — SIGNIFICANT CHANGE UP (ref 0–0)
PHOSPHATE SERPL-MCNC: 3.7 MG/DL — SIGNIFICANT CHANGE UP (ref 2.5–4.5)
PLATELET # BLD AUTO: 211 K/UL — SIGNIFICANT CHANGE UP (ref 150–400)
POTASSIUM SERPL-MCNC: 4 MMOL/L — SIGNIFICANT CHANGE UP (ref 3.5–5.3)
POTASSIUM SERPL-SCNC: 4 MMOL/L — SIGNIFICANT CHANGE UP (ref 3.5–5.3)
RBC # BLD: 4.88 M/UL — SIGNIFICANT CHANGE UP (ref 4.2–5.8)
RBC # FLD: 13.9 % — SIGNIFICANT CHANGE UP (ref 10.3–14.5)
RH IG SCN BLD-IMP: POSITIVE — SIGNIFICANT CHANGE UP
SODIUM SERPL-SCNC: 140 MMOL/L — SIGNIFICANT CHANGE UP (ref 135–145)
WBC # BLD: 8.43 K/UL — SIGNIFICANT CHANGE UP (ref 3.8–10.5)
WBC # FLD AUTO: 8.43 K/UL — SIGNIFICANT CHANGE UP (ref 3.8–10.5)

## 2019-08-30 PROCEDURE — 99222 1ST HOSP IP/OBS MODERATE 55: CPT | Mod: GC

## 2019-08-30 PROCEDURE — 71045 X-RAY EXAM CHEST 1 VIEW: CPT | Mod: 26

## 2019-08-30 RX ORDER — LISINOPRIL 2.5 MG/1
5 TABLET ORAL DAILY
Refills: 0 | Status: DISCONTINUED | OUTPATIENT
Start: 2019-08-30 | End: 2019-09-03

## 2019-08-30 RX ORDER — DOCUSATE SODIUM 100 MG
100 CAPSULE ORAL THREE TIMES A DAY
Refills: 0 | Status: DISCONTINUED | OUTPATIENT
Start: 2019-08-30 | End: 2019-09-03

## 2019-08-30 RX ORDER — PIPERACILLIN AND TAZOBACTAM 4; .5 G/20ML; G/20ML
3.38 INJECTION, POWDER, LYOPHILIZED, FOR SOLUTION INTRAVENOUS EVERY 6 HOURS
Refills: 0 | Status: DISCONTINUED | OUTPATIENT
Start: 2019-08-30 | End: 2019-08-31

## 2019-08-30 RX ORDER — DIVALPROEX SODIUM 500 MG/1
500 TABLET, DELAYED RELEASE ORAL THREE TIMES A DAY
Refills: 0 | Status: DISCONTINUED | OUTPATIENT
Start: 2019-08-30 | End: 2019-09-03

## 2019-08-30 RX ORDER — DOXAZOSIN MESYLATE 4 MG
2 TABLET ORAL AT BEDTIME
Refills: 0 | Status: DISCONTINUED | OUTPATIENT
Start: 2019-08-30 | End: 2019-09-03

## 2019-08-30 RX ORDER — TRAZODONE HCL 50 MG
100 TABLET ORAL
Refills: 0 | Status: DISCONTINUED | OUTPATIENT
Start: 2019-08-30 | End: 2019-08-30

## 2019-08-30 RX ORDER — PIPERACILLIN AND TAZOBACTAM 4; .5 G/20ML; G/20ML
3.38 INJECTION, POWDER, LYOPHILIZED, FOR SOLUTION INTRAVENOUS ONCE
Refills: 0 | Status: COMPLETED | OUTPATIENT
Start: 2019-08-30 | End: 2019-08-30

## 2019-08-30 RX ORDER — OXYCODONE AND ACETAMINOPHEN 5; 325 MG/1; MG/1
1 TABLET ORAL EVERY 6 HOURS
Refills: 0 | Status: DISCONTINUED | OUTPATIENT
Start: 2019-08-30 | End: 2019-09-03

## 2019-08-30 RX ORDER — SIMVASTATIN 20 MG/1
10 TABLET, FILM COATED ORAL AT BEDTIME
Refills: 0 | Status: DISCONTINUED | OUTPATIENT
Start: 2019-08-30 | End: 2019-09-03

## 2019-08-30 RX ORDER — HYDROCHLOROTHIAZIDE 25 MG
25 TABLET ORAL DAILY
Refills: 0 | Status: DISCONTINUED | OUTPATIENT
Start: 2019-08-30 | End: 2019-09-03

## 2019-08-30 RX ORDER — MAGNESIUM SULFATE 500 MG/ML
1 VIAL (ML) INJECTION ONCE
Refills: 0 | Status: COMPLETED | OUTPATIENT
Start: 2019-08-30 | End: 2019-08-30

## 2019-08-30 RX ORDER — ONDANSETRON 8 MG/1
4 TABLET, FILM COATED ORAL ONCE
Refills: 0 | Status: COMPLETED | OUTPATIENT
Start: 2019-08-30 | End: 2019-08-30

## 2019-08-30 RX ORDER — PANTOPRAZOLE SODIUM 20 MG/1
40 TABLET, DELAYED RELEASE ORAL
Refills: 0 | Status: DISCONTINUED | OUTPATIENT
Start: 2019-08-30 | End: 2019-09-03

## 2019-08-30 RX ORDER — ENOXAPARIN SODIUM 100 MG/ML
40 INJECTION SUBCUTANEOUS DAILY
Refills: 0 | Status: DISCONTINUED | OUTPATIENT
Start: 2019-08-30 | End: 2019-09-03

## 2019-08-30 RX ORDER — HYDRALAZINE HCL 50 MG
10 TABLET ORAL ONCE
Refills: 0 | Status: COMPLETED | OUTPATIENT
Start: 2019-08-30 | End: 2019-08-30

## 2019-08-30 RX ORDER — ALLOPURINOL 300 MG
300 TABLET ORAL DAILY
Refills: 0 | Status: DISCONTINUED | OUTPATIENT
Start: 2019-08-30 | End: 2019-09-03

## 2019-08-30 RX ORDER — OXYCODONE AND ACETAMINOPHEN 5; 325 MG/1; MG/1
1 TABLET ORAL EVERY 6 HOURS
Refills: 0 | Status: DISCONTINUED | OUTPATIENT
Start: 2019-08-30 | End: 2019-08-30

## 2019-08-30 RX ADMIN — OXYCODONE AND ACETAMINOPHEN 1 TABLET(S): 5; 325 TABLET ORAL at 09:00

## 2019-08-30 RX ADMIN — SIMVASTATIN 10 MILLIGRAM(S): 20 TABLET, FILM COATED ORAL at 21:16

## 2019-08-30 RX ADMIN — PIPERACILLIN AND TAZOBACTAM 200 GRAM(S): 4; .5 INJECTION, POWDER, LYOPHILIZED, FOR SOLUTION INTRAVENOUS at 01:49

## 2019-08-30 RX ADMIN — Medication 100 MILLIGRAM(S): at 14:28

## 2019-08-30 RX ADMIN — DIVALPROEX SODIUM 500 MILLIGRAM(S): 500 TABLET, DELAYED RELEASE ORAL at 14:28

## 2019-08-30 RX ADMIN — PIPERACILLIN AND TAZOBACTAM 200 GRAM(S): 4; .5 INJECTION, POWDER, LYOPHILIZED, FOR SOLUTION INTRAVENOUS at 14:27

## 2019-08-30 RX ADMIN — Medication 100 MILLIGRAM(S): at 05:40

## 2019-08-30 RX ADMIN — ENOXAPARIN SODIUM 40 MILLIGRAM(S): 100 INJECTION SUBCUTANEOUS at 11:24

## 2019-08-30 RX ADMIN — OXYCODONE AND ACETAMINOPHEN 1 TABLET(S): 5; 325 TABLET ORAL at 01:20

## 2019-08-30 RX ADMIN — Medication 300 MILLIGRAM(S): at 23:43

## 2019-08-30 RX ADMIN — Medication 10 MILLIGRAM(S): at 05:39

## 2019-08-30 RX ADMIN — LISINOPRIL 5 MILLIGRAM(S): 2.5 TABLET ORAL at 00:27

## 2019-08-30 RX ADMIN — Medication 300 MILLIGRAM(S): at 12:31

## 2019-08-30 RX ADMIN — Medication 25 MILLIGRAM(S): at 00:27

## 2019-08-30 RX ADMIN — PIPERACILLIN AND TAZOBACTAM 200 GRAM(S): 4; .5 INJECTION, POWDER, LYOPHILIZED, FOR SOLUTION INTRAVENOUS at 21:15

## 2019-08-30 RX ADMIN — DIVALPROEX SODIUM 500 MILLIGRAM(S): 500 TABLET, DELAYED RELEASE ORAL at 21:16

## 2019-08-30 RX ADMIN — PANTOPRAZOLE SODIUM 40 MILLIGRAM(S): 20 TABLET, DELAYED RELEASE ORAL at 02:27

## 2019-08-30 RX ADMIN — Medication 100 GRAM(S): at 11:24

## 2019-08-30 RX ADMIN — PIPERACILLIN AND TAZOBACTAM 200 GRAM(S): 4; .5 INJECTION, POWDER, LYOPHILIZED, FOR SOLUTION INTRAVENOUS at 08:24

## 2019-08-30 RX ADMIN — Medication 300 MILLIGRAM(S): at 11:24

## 2019-08-30 RX ADMIN — OXYCODONE AND ACETAMINOPHEN 1 TABLET(S): 5; 325 TABLET ORAL at 00:27

## 2019-08-30 RX ADMIN — OXYCODONE AND ACETAMINOPHEN 1 TABLET(S): 5; 325 TABLET ORAL at 08:26

## 2019-08-30 RX ADMIN — DIVALPROEX SODIUM 500 MILLIGRAM(S): 500 TABLET, DELAYED RELEASE ORAL at 05:40

## 2019-08-30 RX ADMIN — Medication 100 MILLIGRAM(S): at 21:16

## 2019-08-30 RX ADMIN — Medication 2 MILLIGRAM(S): at 21:16

## 2019-08-30 RX ADMIN — ONDANSETRON 4 MILLIGRAM(S): 8 TABLET, FILM COATED ORAL at 07:04

## 2019-08-30 NOTE — PROGRESS NOTE ADULT - SUBJECTIVE AND OBJECTIVE BOX
O/N: Adm w/ RLE Cellulitis                    64y.o male with hx of HTN, HLD, BPH, Depression who was referred from Dr Schumacher's office for IV Abx 2/2 to RLE Cellulitis.    -IV Abx Vanc/Zosyn  -Home Meds  -Reg diet  -Compression Elevation or RLE  -am labs O/N: Adm w/ RLE Cellulitis      Patient is a 64y old male who presents with a chief complaint of RLE chronic cellulitis. Pt states it has improved since yesterday. No acute complaints.      MEDICATIONS  (STANDING):  allopurinol 300 milliGRAM(s) Oral daily  diVALproex  milliGRAM(s) Oral three times a day  docusate sodium 100 milliGRAM(s) Oral three times a day  doxazosin 2 milliGRAM(s) Oral at bedtime  enoxaparin Injectable 40 milliGRAM(s) SubCutaneous daily  hydrochlorothiazide 25 milliGRAM(s) Oral daily  lisinopril 5 milliGRAM(s) Oral daily  magnesium sulfate  IVPB 1 Gram(s) IV Intermittent once  pantoprazole    Tablet 40 milliGRAM(s) Oral before breakfast  piperacillin/tazobactam IVPB.. 3.375 Gram(s) IV Intermittent every 6 hours  simvastatin 10 milliGRAM(s) Oral at bedtime  traZODone 100 milliGRAM(s) Oral two times a day  vancomycin  IVPB 1500 milliGRAM(s) IV Intermittent every 12 hours    MEDICATIONS  (PRN):  oxyCODONE    5 mG/acetaminophen 325 mG 1 Tablet(s) Oral every 6 hours PRN Moderate Pain (4 - 6)      Allergies: Haldol (Rash)      Vital Signs Last 24 Hrs  T(C): 36.4 (30 Aug 2019 09:29), Max: 36.6 (30 Aug 2019 07:02)  T(F): 97.5 (30 Aug 2019 09:29), Max: 97.9 (30 Aug 2019 07:02)  HR: 68 (30 Aug 2019 08:52) (55 - 92)  BP: 131/76 (30 Aug 2019 08:52) (108/104 - 198/88)  BP(mean): --  RR: 18 (30 Aug 2019 08:52) (16 - 18)  SpO2: 94% (30 Aug 2019 08:52) (94% - 98%)  CAPILLARY BLOOD GLUCOSE          08-30 @ 07:01  -  08-30 @ 11:16  --------------------------------------------------------  IN: 180 mL / OUT: 0 mL / NET: 180 mL        Physical Exam:    General:  Well appearing, NAD  HEENT:  Nonicteric, PERRLA  Lungs:  Non-labored respirations. No respiratory distress.  Extremities:  RLE erythematous below knee with moderate swelling. No apparent wounds. 2+ DP b/l	  Skin:  Warm and dry, no rashes  Neuro:  AAOx3, non-focal.    LABS:                        15.2   8.43  )-----------( 211      ( 30 Aug 2019 06:04 )             46.1     08-30    140  |  101  |  15  ----------------------------<  103<H>  4.0   |  30  |  0.95    Ca    9.2      30 Aug 2019 06:04  Phos  3.7     08-30  Mg     1.8     08-30    TPro  6.8  /  Alb  4.0  /  TBili  0.3  /  DBili  x   /  AST  16  /  ALT  14  /  AlkPhos  61  08-29    PT/INR - ( 29 Aug 2019 18:18 )   PT: 13.1 sec;   INR: 1.15          PTT - ( 29 Aug 2019 18:18 )  PTT:34.8 sec        RADIOLOGY & ADDITIONAL TESTS: RLE Doppler negative for DVT    ---------------------------------------------------------------------------  PLEASE CHECK WHEN PRESENT:     [  ]Heart Failure     [  ] Acute     [  ] Acute on Chronic     [  ] Chronic  -------------------------------------------------------------------     [  ]Diastolic [HFpEF]     [  ]Systolic [HFrEF]     [  ]Combined [HFpEF & HFrEF]     [  ]Other:  -------------------------------------------------------------------  [ ] Respiratory failure  [ ] Acute cor pulmonale  [ ] Asthma/COPD Exacerbation  [ ] Pleural effusion  [ ] Aspiration pneumonia  -------------------------------------------------------------------  [  ]KEYUR     [  ]ATN     [  ]Renal Medullary Necrosis     [  ]Renal Cortical Necrosis     [  ]Other Pathological Lesions:    [  ]CKD 1  [  ]CKD 2  [  ]CKD 3  [  ]CKD 4  [  ]CKD 5  [  ]Other  -------------------------------------------------------------------  [  ]Diabetes  [  ] Diabetic PVD Ulcer  [  ] Neuropathic ulcer to DM  [  ] Diabetes with Nephropathy  [  ] Osteomyelitis due to diabetes  --------------------------------------------------------------------  [  ]Malnutrition: See Nutrition Note  [  ]Cachexia  [  ]Other:   [  ]Supplement Ordered:  [  ]Morbid Obesity (BMI >=40]  ---------------------------------------------------------------------  [ ] Sepsis/severe sepsis/septic shock  [ ] UTI  [ ] Pneumonia  -----------------------------------------------------------------------  [ ] Acidosis/alkalosis  [ ] Fluid overload  [ ] Hypokalemia  [ ] Hyperkalemia  [ ] Hypomagnesemia  [ ] Hypophosphatemia  [ ] Hyperphosphatemia  ------------------------------------------------------------------------  [ ] Acute blood loss anemia  [ ] Post op blood loss anemia  [ ] Iron deficiency anemia  [ ] Anemia due to chronic disease  [ ] Hypercoagulable state  ----------------------------------------------------------------------  [ ] Cerebral infarction  [ ] Transient ischemia attack  [ ] Encephalopathy      Assessment/Plan: 64y.o male with hx of HTN, HLD, BPH, depression who was referred from Dr Schumacher's office for IV Abx 2/2 to RLE Cellulitis. Cellulitis improving.     - IV Abx Vanc/Zosyn- consider de-escalating   - Home Meds  - Regular diet  - DVT ppx  - Compression wrap, elevation of RLE  - F/U blood cx  - AM labs

## 2019-08-31 LAB — VANCOMYCIN TROUGH SERPL-MCNC: 16.7 UG/ML — SIGNIFICANT CHANGE UP (ref 10–20)

## 2019-08-31 RX ORDER — SENNA PLUS 8.6 MG/1
2 TABLET ORAL AT BEDTIME
Refills: 0 | Status: DISCONTINUED | OUTPATIENT
Start: 2019-08-31 | End: 2019-09-03

## 2019-08-31 RX ORDER — ACETAMINOPHEN 500 MG
1000 TABLET ORAL ONCE
Refills: 0 | Status: COMPLETED | OUTPATIENT
Start: 2019-08-31 | End: 2019-08-31

## 2019-08-31 RX ADMIN — OXYCODONE AND ACETAMINOPHEN 1 TABLET(S): 5; 325 TABLET ORAL at 01:33

## 2019-08-31 RX ADMIN — LISINOPRIL 5 MILLIGRAM(S): 2.5 TABLET ORAL at 05:23

## 2019-08-31 RX ADMIN — Medication 100 MILLIGRAM(S): at 14:01

## 2019-08-31 RX ADMIN — DIVALPROEX SODIUM 500 MILLIGRAM(S): 500 TABLET, DELAYED RELEASE ORAL at 22:33

## 2019-08-31 RX ADMIN — Medication 100 MILLIGRAM(S): at 22:33

## 2019-08-31 RX ADMIN — OXYCODONE AND ACETAMINOPHEN 1 TABLET(S): 5; 325 TABLET ORAL at 16:05

## 2019-08-31 RX ADMIN — PANTOPRAZOLE SODIUM 40 MILLIGRAM(S): 20 TABLET, DELAYED RELEASE ORAL at 05:22

## 2019-08-31 RX ADMIN — Medication 300 MILLIGRAM(S): at 11:17

## 2019-08-31 RX ADMIN — PIPERACILLIN AND TAZOBACTAM 200 GRAM(S): 4; .5 INJECTION, POWDER, LYOPHILIZED, FOR SOLUTION INTRAVENOUS at 03:03

## 2019-08-31 RX ADMIN — Medication 400 MILLIGRAM(S): at 18:34

## 2019-08-31 RX ADMIN — ENOXAPARIN SODIUM 40 MILLIGRAM(S): 100 INJECTION SUBCUTANEOUS at 11:17

## 2019-08-31 RX ADMIN — OXYCODONE AND ACETAMINOPHEN 1 TABLET(S): 5; 325 TABLET ORAL at 17:15

## 2019-08-31 RX ADMIN — Medication 300 MILLIGRAM(S): at 12:19

## 2019-08-31 RX ADMIN — PIPERACILLIN AND TAZOBACTAM 200 GRAM(S): 4; .5 INJECTION, POWDER, LYOPHILIZED, FOR SOLUTION INTRAVENOUS at 09:51

## 2019-08-31 RX ADMIN — Medication 300 MILLIGRAM(S): at 22:33

## 2019-08-31 RX ADMIN — OXYCODONE AND ACETAMINOPHEN 1 TABLET(S): 5; 325 TABLET ORAL at 22:38

## 2019-08-31 RX ADMIN — Medication 100 MILLIGRAM(S): at 05:23

## 2019-08-31 RX ADMIN — DIVALPROEX SODIUM 500 MILLIGRAM(S): 500 TABLET, DELAYED RELEASE ORAL at 14:01

## 2019-08-31 RX ADMIN — OXYCODONE AND ACETAMINOPHEN 1 TABLET(S): 5; 325 TABLET ORAL at 00:40

## 2019-08-31 RX ADMIN — DIVALPROEX SODIUM 500 MILLIGRAM(S): 500 TABLET, DELAYED RELEASE ORAL at 05:23

## 2019-08-31 RX ADMIN — Medication 25 MILLIGRAM(S): at 05:23

## 2019-08-31 RX ADMIN — SENNA PLUS 2 TABLET(S): 8.6 TABLET ORAL at 22:33

## 2019-08-31 RX ADMIN — Medication 2 MILLIGRAM(S): at 22:33

## 2019-08-31 RX ADMIN — OXYCODONE AND ACETAMINOPHEN 1 TABLET(S): 5; 325 TABLET ORAL at 23:30

## 2019-08-31 RX ADMIN — SIMVASTATIN 10 MILLIGRAM(S): 20 TABLET, FILM COATED ORAL at 22:33

## 2019-08-31 NOTE — PROGRESS NOTE ADULT - SUBJECTIVE AND OBJECTIVE BOX
O/N: MARCI, VSS                              Assessment/Plan: 64y.o male with hx of HTN, HLD, BPH, depression who was referred from Dr Schumacher's office for IV Abx 2/2 to RLE Cellulitis. Cellulitis improving.     - IV Abx Vanc, consider de-escalating   - Home Meds  - Regular diet  - DVT ppx  - Compression wrap, elevation of RLE  - F/U blood cx  - AM labs 24 hr events  O/N: MARCI, VSS    Subjective:  No acute complaints. Still notices the swelling but thinks the swelling and redness are improved. Denies CP/SOB. Denies N/V.    Vital Signs Last 24 Hrs  T(C): 36.8 (31 Aug 2019 08:16), Max: 36.8 (31 Aug 2019 08:16)  T(F): 98.3 (31 Aug 2019 08:16), Max: 98.3 (31 Aug 2019 08:16)  HR: 68 (31 Aug 2019 08:36) (66 - 74)  BP: 118/74 (31 Aug 2019 08:36) (118/74 - 142/78)  BP(mean): --  RR: 18 (31 Aug 2019 08:36) (16 - 18)  SpO2: 98% (31 Aug 2019 08:36) (94% - 98%)    I&O's Summary  30 Aug 2019 07:01  -  31 Aug 2019 07:00  --------------------------------------------------------  IN: 1090 mL / OUT: 0 mL / NET: 1090 mL    31 Aug 2019 07:01  -  31 Aug 2019 09:49  --------------------------------------------------------  IN: 420 mL / OUT: 0 mL / NET: 420 mL    Physical Exam:  General: NAD, resting comfortably  Pulmonary: normal resp effort  Extremities: RLE edema from foot to thigh with improving erythema on anterior shin; small blisters forming on calf, still intact  Neuro: A/O x 3  Pulses: DP 2+    LABS:                     15.2   8.43  )-----------( 211      ( 30 Aug 2019 06:04 )             46.1     08-30  140  |  101  |  15  ----------------------------<  103<H>  4.0   |  30  |  0.95    Ca    9.2      30 Aug 2019 06:04  Phos  3.7     08-30  Mg     1.8     08-30    TPro  6.8  /  Alb  4.0  /  TBili  0.3  /  DBili  x   /  AST  16  /  ALT  14  /  AlkPhos  61  08-29    PT/INR - ( 29 Aug 2019 18:18 )   PT: 13.1 sec;   INR: 1.15     PTT - ( 29 Aug 2019 18:18 )  PTT:34.8 sec    LIVER FUNCTIONS - ( 29 Aug 2019 17:51 )  Alb: 4.0 g/dL / Pro: 6.8 g/dL / ALK PHOS: 61 U/L / ALT: 14 U/L / AST: 16 U/L / GGT: x           Assessment/Plan: 64y.o male with hx of HTN, HLD, BPH, depression who was referred from Dr Schumacher's office for IV Abx 2/2 to RLE Cellulitis. Cellulitis improving.     - IV Abx Vanc  - Home Meds  - Regular diet  - DVT ppx  - Compression wrap, elevation of RLE

## 2019-09-01 LAB — VANCOMYCIN TROUGH SERPL-MCNC: 19.4 UG/ML — SIGNIFICANT CHANGE UP (ref 10–20)

## 2019-09-01 RX ORDER — PIPERACILLIN AND TAZOBACTAM 4; .5 G/20ML; G/20ML
3.38 INJECTION, POWDER, LYOPHILIZED, FOR SOLUTION INTRAVENOUS EVERY 6 HOURS
Refills: 0 | Status: DISCONTINUED | OUTPATIENT
Start: 2019-09-01 | End: 2019-09-03

## 2019-09-01 RX ADMIN — ENOXAPARIN SODIUM 40 MILLIGRAM(S): 100 INJECTION SUBCUTANEOUS at 11:25

## 2019-09-01 RX ADMIN — Medication 300 MILLIGRAM(S): at 11:25

## 2019-09-01 RX ADMIN — SENNA PLUS 2 TABLET(S): 8.6 TABLET ORAL at 22:59

## 2019-09-01 RX ADMIN — Medication 100 MILLIGRAM(S): at 14:32

## 2019-09-01 RX ADMIN — Medication 300 MILLIGRAM(S): at 23:42

## 2019-09-01 RX ADMIN — PIPERACILLIN AND TAZOBACTAM 200 GRAM(S): 4; .5 INJECTION, POWDER, LYOPHILIZED, FOR SOLUTION INTRAVENOUS at 11:20

## 2019-09-01 RX ADMIN — LISINOPRIL 5 MILLIGRAM(S): 2.5 TABLET ORAL at 05:38

## 2019-09-01 RX ADMIN — SIMVASTATIN 10 MILLIGRAM(S): 20 TABLET, FILM COATED ORAL at 23:00

## 2019-09-01 RX ADMIN — Medication 100 MILLIGRAM(S): at 22:59

## 2019-09-01 RX ADMIN — Medication 25 MILLIGRAM(S): at 05:38

## 2019-09-01 RX ADMIN — DIVALPROEX SODIUM 500 MILLIGRAM(S): 500 TABLET, DELAYED RELEASE ORAL at 05:37

## 2019-09-01 RX ADMIN — Medication 2 MILLIGRAM(S): at 22:58

## 2019-09-01 RX ADMIN — DIVALPROEX SODIUM 500 MILLIGRAM(S): 500 TABLET, DELAYED RELEASE ORAL at 22:59

## 2019-09-01 RX ADMIN — Medication 100 MILLIGRAM(S): at 05:38

## 2019-09-01 RX ADMIN — PIPERACILLIN AND TAZOBACTAM 200 GRAM(S): 4; .5 INJECTION, POWDER, LYOPHILIZED, FOR SOLUTION INTRAVENOUS at 18:39

## 2019-09-01 RX ADMIN — DIVALPROEX SODIUM 500 MILLIGRAM(S): 500 TABLET, DELAYED RELEASE ORAL at 14:31

## 2019-09-01 RX ADMIN — PANTOPRAZOLE SODIUM 40 MILLIGRAM(S): 20 TABLET, DELAYED RELEASE ORAL at 07:05

## 2019-09-01 RX ADMIN — Medication 300 MILLIGRAM(S): at 12:16

## 2019-09-01 NOTE — PROGRESS NOTE ADULT - SUBJECTIVE AND OBJECTIVE BOX
O/N: MARCI, AHSAN  8/31: Vanc trough 16.7, ok for vanc; dc'd zosyn; silvadene ordered for bedside                  64y.o male with hx of HTN, HLD, BPH, Depression who was referred from Dr Schumacher's office for IV Abx 2/2 to RLE Cellulitis.    -IV Abx Vanc  -Home Meds  -Reg diet  -Compression Elevation or RLE O/N: MARCI AHSAN  8/31: Vanc trough 16.7, ok for vanc; dc'd zosyn; silvadene ordered for bedside      Patient is a 64y old  Male who presents with a chief complaint of cellulitis. Pt complaining of persistent swelling in the leg with pain in ipsilateral knee.        MEDICATIONS  (STANDING):  allopurinol 300 milliGRAM(s) Oral daily  diVALproex  milliGRAM(s) Oral three times a day  docusate sodium 100 milliGRAM(s) Oral three times a day  doxazosin 2 milliGRAM(s) Oral at bedtime  enoxaparin Injectable 40 milliGRAM(s) SubCutaneous daily  hydrochlorothiazide 25 milliGRAM(s) Oral daily  lisinopril 5 milliGRAM(s) Oral daily  pantoprazole    Tablet 40 milliGRAM(s) Oral before breakfast  piperacillin/tazobactam IVPB.. 3.375 Gram(s) IV Intermittent every 6 hours  senna 2 Tablet(s) Oral at bedtime  silver sulfADIAZINE 1% Cream 1 Application(s) Topical three times a day  simvastatin 10 milliGRAM(s) Oral at bedtime  vancomycin  IVPB 1500 milliGRAM(s) IV Intermittent every 12 hours    MEDICATIONS  (PRN):  oxyCODONE    5 mG/acetaminophen 325 mG 1 Tablet(s) Oral every 6 hours PRN Moderate Pain (4 - 6)      Allergies: Haldol (Rash)      Vital Signs Last 24 Hrs  T(C): 36 (01 Sep 2019 09:53), Max: 36.7 (01 Sep 2019 06:11)  T(F): 96.8 (01 Sep 2019 09:53), Max: 98 (01 Sep 2019 06:11)  HR: 76 (01 Sep 2019 08:44) (64 - 79)  BP: 135/79 (01 Sep 2019 08:44) (104/67 - 167/97)  BP(mean): 96 (01 Sep 2019 05:34) (94 - 96)  RR: 16 (01 Sep 2019 08:44) (16 - 17)  SpO2: 96% (01 Sep 2019 08:44) (96% - 98%)  CAPILLARY BLOOD GLUCOSE          08-31 @ 07:01  -  09-01 @ 07:00  --------------------------------------------------------  IN: 1320 mL / OUT: 175 mL / NET: 1145 mL    09-01 @ 07:01 - 09-01 @ 10:31  --------------------------------------------------------  IN: 300 mL / OUT: 0 mL / NET: 300 mL        Physical Exam:    General:  Well appearing, NAD  Lungs:  Non-labored respirations. No respiratory distress.  Extremities: RLE edema from foot to thigh with minimal improvement of erythema. 2+ R DP.  Skin:  Warm and dry, no rashes  :  No bee  Neuro:  AAOx3, non-focal.      ---------------------------------------------------------------------------  I personally reviewed: [  ]EKG   [  ]CXR    [  ] CT    [  ]Other  ---------------------------------------------------------------------------  PLEASE CHECK WHEN PRESENT:     [  ]Heart Failure     [  ] Acute     [  ] Acute on Chronic     [  ] Chronic  -------------------------------------------------------------------     [  ]Diastolic [HFpEF]     [  ]Systolic [HFrEF]     [  ]Combined [HFpEF & HFrEF]     [  ]Other:  -------------------------------------------------------------------  [ ] Respiratory failure  [ ] Acute cor pulmonale  [ ] Asthma/COPD Exacerbation  [ ] Pleural effusion  [ ] Aspiration pneumonia  -------------------------------------------------------------------  [  ]KEYUR     [  ]ATN     [  ]Renal Medullary Necrosis     [  ]Renal Cortical Necrosis     [  ]Other Pathological Lesions:    [  ]CKD 1  [  ]CKD 2  [  ]CKD 3  [  ]CKD 4  [  ]CKD 5  [  ]Other  -------------------------------------------------------------------  [  ]Diabetes  [  ] Diabetic PVD Ulcer  [  ] Neuropathic ulcer to DM  [  ] Diabetes with Nephropathy  [  ] Osteomyelitis due to diabetes  --------------------------------------------------------------------  [  ]Malnutrition: See Nutrition Note  [  ]Cachexia  [  ]Other:   [  ]Supplement Ordered:  [  ]Morbid Obesity (BMI >=40]  ---------------------------------------------------------------------  [ ] Sepsis/severe sepsis/septic shock  [ ] UTI  [ ] Pneumonia  -----------------------------------------------------------------------  [ ] Acidosis/alkalosis  [ ] Fluid overload  [ ] Hypokalemia  [ ] Hyperkalemia  [ ] Hypomagnesemia  [ ] Hypophosphatemia  [ ] Hyperphosphatemia  ------------------------------------------------------------------------  [ ] Acute blood loss anemia  [ ] Post op blood loss anemia  [ ] Iron deficiency anemia  [ ] Anemia due to chronic disease  [ ] Hypercoagulable state  ----------------------------------------------------------------------  [ ] Cerebral infarction  [ ] Transient ischemia attack  [ ] Encephalopathy      Assessment/Plan: 64y Male with hx of HTN, HLD, BPH, Depression who was referred from Dr Schumacher's office for IV Abx 2/2 to RLE Cellulitis, initially improving but now without marked improvement.    -IV Abx Vanc; Zosyn resumed  -Home Meds  -Reg diet  -Compression, elevation of RLE  - Silvadene to RLE wounds/blisters

## 2019-09-02 LAB — VANCOMYCIN TROUGH SERPL-MCNC: 19.2 UG/ML — SIGNIFICANT CHANGE UP (ref 10–20)

## 2019-09-02 RX ADMIN — Medication 300 MILLIGRAM(S): at 13:41

## 2019-09-02 RX ADMIN — PIPERACILLIN AND TAZOBACTAM 200 GRAM(S): 4; .5 INJECTION, POWDER, LYOPHILIZED, FOR SOLUTION INTRAVENOUS at 00:54

## 2019-09-02 RX ADMIN — DIVALPROEX SODIUM 500 MILLIGRAM(S): 500 TABLET, DELAYED RELEASE ORAL at 13:42

## 2019-09-02 RX ADMIN — Medication 300 MILLIGRAM(S): at 11:34

## 2019-09-02 RX ADMIN — PIPERACILLIN AND TAZOBACTAM 200 GRAM(S): 4; .5 INJECTION, POWDER, LYOPHILIZED, FOR SOLUTION INTRAVENOUS at 13:41

## 2019-09-02 RX ADMIN — Medication 2 MILLIGRAM(S): at 23:09

## 2019-09-02 RX ADMIN — ENOXAPARIN SODIUM 40 MILLIGRAM(S): 100 INJECTION SUBCUTANEOUS at 13:42

## 2019-09-02 RX ADMIN — DIVALPROEX SODIUM 500 MILLIGRAM(S): 500 TABLET, DELAYED RELEASE ORAL at 06:42

## 2019-09-02 RX ADMIN — PIPERACILLIN AND TAZOBACTAM 200 GRAM(S): 4; .5 INJECTION, POWDER, LYOPHILIZED, FOR SOLUTION INTRAVENOUS at 06:43

## 2019-09-02 RX ADMIN — DIVALPROEX SODIUM 500 MILLIGRAM(S): 500 TABLET, DELAYED RELEASE ORAL at 23:09

## 2019-09-02 RX ADMIN — LISINOPRIL 5 MILLIGRAM(S): 2.5 TABLET ORAL at 06:42

## 2019-09-02 RX ADMIN — PANTOPRAZOLE SODIUM 40 MILLIGRAM(S): 20 TABLET, DELAYED RELEASE ORAL at 06:42

## 2019-09-02 RX ADMIN — PIPERACILLIN AND TAZOBACTAM 200 GRAM(S): 4; .5 INJECTION, POWDER, LYOPHILIZED, FOR SOLUTION INTRAVENOUS at 18:22

## 2019-09-02 RX ADMIN — Medication 300 MILLIGRAM(S): at 23:10

## 2019-09-02 RX ADMIN — Medication 100 MILLIGRAM(S): at 06:42

## 2019-09-02 RX ADMIN — Medication 25 MILLIGRAM(S): at 06:42

## 2019-09-02 RX ADMIN — SIMVASTATIN 10 MILLIGRAM(S): 20 TABLET, FILM COATED ORAL at 23:08

## 2019-09-02 NOTE — PROGRESS NOTE ADULT - SUBJECTIVE AND OBJECTIVE BOX
24hr Events:  O/N: Vancomycin trough 19.4, dose given, VSS  9/1: Resumed zosyn. Silvadene to bedside          Assessment/Plan;  64y Male with hx of HTN, HLD, BPH, Depression who was referred from Dr Schumacher's office for IV Abx 2/2 to RLE Cellulitis, initially improving but now without marked improvement.    -IV Abx Vanc; Zosyn   -Home Meds  -Reg diet  -Compression, elevation of RLE  - Silvadene to RLE wounds/blisters 24hr Events:  O/N: Vancomycin trough 19.4, dose given, VSS  9/1: Resumed zosyn. Silvadene to bedside        Patient is a 64y old  Male who presents with a chief complaint of cellulitis (02 Sep 2019 05:34)    Patient was seen and examined at bedside. No acute events overnight. No complaints.        INTERVAL HPI/OVERNIGHT EVENTS:    Review of Systems: 12 point review of systems otherwise negative      MEDICATIONS  (STANDING):  allopurinol 300 milliGRAM(s) Oral daily  diVALproex  milliGRAM(s) Oral three times a day  docusate sodium 100 milliGRAM(s) Oral three times a day  doxazosin 2 milliGRAM(s) Oral at bedtime  enoxaparin Injectable 40 milliGRAM(s) SubCutaneous daily  hydrochlorothiazide 25 milliGRAM(s) Oral daily  lisinopril 5 milliGRAM(s) Oral daily  pantoprazole    Tablet 40 milliGRAM(s) Oral before breakfast  piperacillin/tazobactam IVPB.. 3.375 Gram(s) IV Intermittent every 6 hours  senna 2 Tablet(s) Oral at bedtime  silver sulfADIAZINE 1% Cream 1 Application(s) Topical three times a day  simvastatin 10 milliGRAM(s) Oral at bedtime  vancomycin  IVPB 1500 milliGRAM(s) IV Intermittent every 12 hours    MEDICATIONS  (PRN):  oxyCODONE    5 mG/acetaminophen 325 mG 1 Tablet(s) Oral every 6 hours PRN Moderate Pain (4 - 6)      Allergies    Haldol (Rash)    Intolerances          Vital Signs Last 24 Hrs  T(C): 36.5 (02 Sep 2019 06:17), Max: 37 (01 Sep 2019 18:04)  T(F): 97.7 (02 Sep 2019 06:17), Max: 98.6 (01 Sep 2019 18:04)  HR: 78 (02 Sep 2019 08:12) (62 - 81)  BP: 130/77 (02 Sep 2019 08:12) (130/77 - 157/75)  BP(mean): --  RR: 16 (02 Sep 2019 08:12) (16 - 17)  SpO2: 98% (02 Sep 2019 08:12) (95% - 98%)  CAPILLARY BLOOD GLUCOSE          09-01 @ 07:01  -  09-02 @ 07:00  --------------------------------------------------------  IN: 1680 mL / OUT: 0 mL / NET: 1680 mL        Physical Exam:    Daily     Daily   General:  Well appearing, NAD, not cachetic  HEENT:  Nonicteric, PERRLA  CV:  RRR, no murmur, no JVD  Lungs:  CTA B/L, no wheezes, rales, rhonchi  Abdomen:  Soft, non-tender, no distended, positive BS, no hepatosplenomegaly  Extremities:  2+ pulses, no c/c, no edema  Skin:  Warm and dry, no rashes  :  No gamboa  Neuro:  AAOx3, non-focal, CN II-XII grossly intact  No Restraints    LABS:                  RADIOLOGY & ADDITIONAL TESTS:    ---------------------------------------------------------------------------  I personally reviewed: [  ]EKG   [  ]CXR    [  ] CT    [  ]Other  ---------------------------------------------------------------------------  PLEASE CHECK WHEN PRESENT:     [  ]Heart Failure     [  ] Acute     [  ] Acute on Chronic     [  ] Chronic  -------------------------------------------------------------------     [  ]Diastolic [HFpEF]     [  ]Systolic [HFrEF]     [  ]Combined [HFpEF & HFrEF]     [  ]Other:  -------------------------------------------------------------------  [ ] Respiratory failure  [ ] Acute cor pulmonale  [ ] Asthma/COPD Exacerbation  [ ] Pleural effusion  [ ] Aspiration pneumonia  -------------------------------------------------------------------  [  ]KEYUR     [  ]ATN     [  ]Reneal Medullary Necrosis     [  ]Renal Cortical Necrosis     [  ]Other Pathological Lesions:    [  ]CKD 1  [  ]CKD 2  [  ]CKD 3  [  ]CKD 4  [  ]CKD 5  [  ]Other  -------------------------------------------------------------------  [  ]Diabetes  [  ] Diabetic PVD Ulcer  [  ] Neuropathic ulcer to DM  [  ] Diabetes with Nephropathy  [  ] Osteomyelitis due to diabetes  --------------------------------------------------------------------  [  ]Malnutrition: See Nutrition Note  [  ]Cachexia  [  ]Other:   [  ]Supplement Ordered:  [  ]Morbid Obesity (BMI >=40]  ---------------------------------------------------------------------  [ ] Sepsis/severe sepsis/septic shock  [ ] UTI  [ ] Pneumonia  -----------------------------------------------------------------------  [ ] Acidosis/alkalosis  [ ] Fluid overload  [ ] Hypokalemia  [ ] Hyperkalemia  [ ] Hypomagnesemia  [ ] Hypophosphatemia  [ ] Hyperphosphatemia  ------------------------------------------------------------------------  [ ] Acute blood loss anemia  [ ] Post op blood loss anemia  [ ] Iron deficiency anemia  [ ] Anemia due to chronic disease  [ ] Hypercoagulable state  ----------------------------------------------------------------------  [ ] Cerebral infarction  [ ] Transient ischemia attack  [ ] Encephalopathy      Assessment/Plan: 64y Male                    Assessment/Plan;  64y Male with hx of HTN, HLD, BPH, Depression who was referred from Dr Schumacher's office for IV Abx 2/2 to RLE Cellulitis, initially improving but now without marked improvement.    -IV Abx Vanc; Zosyn   -Home Meds  -Reg diet  -Compression, elevation of RLE  - Silvadene to RLE wounds/blisters 24hr Events:  O/N: Vancomycin trough 19.4, dose given, VSS  9/1: Resumed zosyn. Silvadene to bedside        Patient is a 64y old  Male who presents with a chief complaint of cellulitis (02 Sep 2019 05:34)    Patient was seen and examined at bedside. No acute events overnight. No complaints. RLE still red and edematous        INTERVAL HPI/OVERNIGHT EVENTS:    Review of Systems: 12 point review of systems otherwise negative      MEDICATIONS  (STANDING):  allopurinol 300 milliGRAM(s) Oral daily  diVALproex  milliGRAM(s) Oral three times a day  docusate sodium 100 milliGRAM(s) Oral three times a day  doxazosin 2 milliGRAM(s) Oral at bedtime  enoxaparin Injectable 40 milliGRAM(s) SubCutaneous daily  hydrochlorothiazide 25 milliGRAM(s) Oral daily  lisinopril 5 milliGRAM(s) Oral daily  pantoprazole    Tablet 40 milliGRAM(s) Oral before breakfast  piperacillin/tazobactam IVPB.. 3.375 Gram(s) IV Intermittent every 6 hours  senna 2 Tablet(s) Oral at bedtime  silver sulfADIAZINE 1% Cream 1 Application(s) Topical three times a day  simvastatin 10 milliGRAM(s) Oral at bedtime  vancomycin  IVPB 1500 milliGRAM(s) IV Intermittent every 12 hours    MEDICATIONS  (PRN):  oxyCODONE    5 mG/acetaminophen 325 mG 1 Tablet(s) Oral every 6 hours PRN Moderate Pain (4 - 6)      Allergies    Haldol (Rash)    Intolerances          Vital Signs Last 24 Hrs  T(C): 36.5 (02 Sep 2019 06:17), Max: 37 (01 Sep 2019 18:04)  T(F): 97.7 (02 Sep 2019 06:17), Max: 98.6 (01 Sep 2019 18:04)  HR: 78 (02 Sep 2019 08:12) (62 - 81)  BP: 130/77 (02 Sep 2019 08:12) (130/77 - 157/75)  BP(mean): --  RR: 16 (02 Sep 2019 08:12) (16 - 17)  SpO2: 98% (02 Sep 2019 08:12) (95% - 98%)  CAPILLARY BLOOD GLUCOSE          09-01 @ 07:01  -  09-02 @ 07:00  --------------------------------------------------------  IN: 1680 mL / OUT: 0 mL / NET: 1680 mL        Physical Exam:    Daily     Daily   General:  Well appearing, NAD, not cachetic  HEENT:  Nonicteric, PERRLA  CV:  RRR  Lungs:  CTA B/L  Abdomen:  Soft, non-tender, no distended, positive BS, no hepatosplenomegaly  Extremities:  redness adn edema below knee on LLE. Improved but not resolved.   Skin:  Warm and dry, no rashes  :  No gamboa  Neuro:  AAOx3, non-focal, CN II-XII grossly intact  No Restraints    LABS:                  RADIOLOGY & ADDITIONAL TESTS:    ---------------------------------------------------------------------------  I personally reviewed: [  ]EKG   [  ]CXR    [  ] CT    [  ]Other  ---------------------------------------------------------------------------  PLEASE CHECK WHEN PRESENT:     [  ]Heart Failure     [  ] Acute     [  ] Acute on Chronic     [  ] Chronic  -------------------------------------------------------------------     [  ]Diastolic [HFpEF]     [  ]Systolic [HFrEF]     [  ]Combined [HFpEF & HFrEF]     [  ]Other:  -------------------------------------------------------------------  [ ] Respiratory failure  [ ] Acute cor pulmonale  [ ] Asthma/COPD Exacerbation  [ ] Pleural effusion  [ ] Aspiration pneumonia  -------------------------------------------------------------------  [  ]KEYUR     [  ]ATN     [  ]Reneal Medullary Necrosis     [  ]Renal Cortical Necrosis     [  ]Other Pathological Lesions:    [  ]CKD 1  [  ]CKD 2  [  ]CKD 3  [  ]CKD 4  [  ]CKD 5  [  ]Other  -------------------------------------------------------------------  [  ]Diabetes  [  ] Diabetic PVD Ulcer  [  ] Neuropathic ulcer to DM  [  ] Diabetes with Nephropathy  [  ] Osteomyelitis due to diabetes  --------------------------------------------------------------------  [  ]Malnutrition: See Nutrition Note  [  ]Cachexia  [  ]Other:   [  ]Supplement Ordered:  [  ]Morbid Obesity (BMI >=40]  ---------------------------------------------------------------------  [ ] Sepsis/severe sepsis/septic shock  [ ] UTI  [ ] Pneumonia  -----------------------------------------------------------------------  [ ] Acidosis/alkalosis  [ ] Fluid overload  [ ] Hypokalemia  [ ] Hyperkalemia  [ ] Hypomagnesemia  [ ] Hypophosphatemia  [ ] Hyperphosphatemia  ------------------------------------------------------------------------  [ ] Acute blood loss anemia  [ ] Post op blood loss anemia  [ ] Iron deficiency anemia  [ ] Anemia due to chronic disease  [ ] Hypercoagulable state  ----------------------------------------------------------------------  [ ] Cerebral infarction  [ ] Transient ischemia attack  [ ] Encephalopathy      Assessment/Plan: 64y Male                    Assessment/Plan;  64y Male with hx of HTN, HLD, BPH, Depression who was referred from Dr Schumacher's office for IV Abx 2/2 to RLE Cellulitis, initially improving but now without marked improvement.    -IV Abx Vanc; Zosyn   -Home Meds  -Reg diet  -Compression, elevation of RLE  - Possibhle discharge tomorrow

## 2019-09-03 ENCOUNTER — TRANSCRIPTION ENCOUNTER (OUTPATIENT)
Age: 64
End: 2019-09-03

## 2019-09-03 VITALS — TEMPERATURE: 98 F

## 2019-09-03 RX ADMIN — Medication 25 MILLIGRAM(S): at 06:38

## 2019-09-03 RX ADMIN — DIVALPROEX SODIUM 500 MILLIGRAM(S): 500 TABLET, DELAYED RELEASE ORAL at 06:38

## 2019-09-03 RX ADMIN — PIPERACILLIN AND TAZOBACTAM 200 GRAM(S): 4; .5 INJECTION, POWDER, LYOPHILIZED, FOR SOLUTION INTRAVENOUS at 11:02

## 2019-09-03 RX ADMIN — DIVALPROEX SODIUM 500 MILLIGRAM(S): 500 TABLET, DELAYED RELEASE ORAL at 13:31

## 2019-09-03 RX ADMIN — LISINOPRIL 5 MILLIGRAM(S): 2.5 TABLET ORAL at 06:38

## 2019-09-03 RX ADMIN — PANTOPRAZOLE SODIUM 40 MILLIGRAM(S): 20 TABLET, DELAYED RELEASE ORAL at 06:39

## 2019-09-03 RX ADMIN — PIPERACILLIN AND TAZOBACTAM 200 GRAM(S): 4; .5 INJECTION, POWDER, LYOPHILIZED, FOR SOLUTION INTRAVENOUS at 00:55

## 2019-09-03 RX ADMIN — Medication 300 MILLIGRAM(S): at 11:03

## 2019-09-03 RX ADMIN — ENOXAPARIN SODIUM 40 MILLIGRAM(S): 100 INJECTION SUBCUTANEOUS at 11:03

## 2019-09-03 RX ADMIN — PIPERACILLIN AND TAZOBACTAM 200 GRAM(S): 4; .5 INJECTION, POWDER, LYOPHILIZED, FOR SOLUTION INTRAVENOUS at 06:38

## 2019-09-03 NOTE — DISCHARGE NOTE PROVIDER - CARE PROVIDER_API CALL
Nieves Schumacher)  Surgery; Vascular Surgery  [address], [address] [place] [address]  Phone: [phone]  Fax: [phone]  Follow Up Time:

## 2019-09-03 NOTE — DISCHARGE NOTE PROVIDER - HOSPITAL COURSE
Pt is 64y.o male with hx of HTN, HLD, BPH, Depression who was referred from Dr Schumacher's office for IV Abx 2/2 to RLE Cellulitis.  Pt states he has swelling, redness and pain x3wks.  Pt was given Augmentin x1 week symptoms did not resolve, pt was then placed on Cipro which also did improve. Pt denies fever, chills, SOB or CP.        Patient was admitted to [facility] on 8/29/19 for further management. Pt was started on IV Vancomycin and Zosyn and the leg was treated with compression, elevation, and silvadene. A RLE venous duplex was obtained on 8/29 which was negative for DVT. He remained afebrile and hemodynamically stable, and his home medications were continued throughout entire hospitalization.  His cellulitis has improved and he will be discharged today (9/3/19) with a 7 day course of bactrim and augmentin, with plan to follow up with the surgeon.

## 2019-09-03 NOTE — DISCHARGE NOTE NURSING/CASE MANAGEMENT/SOCIAL WORK - PATIENT PORTAL LINK FT
You can access the FollowMyHealth Patient Portal offered by [facility] by registering at the following website: [email or web address]. By joining [facility]’s FollowMyHealth portal, you will also be able to view your health information using other applications (apps) compatible with our system.

## 2019-09-03 NOTE — DISCHARGE NOTE NURSING/CASE MANAGEMENT/SOCIAL WORK - NSDCPEWEB_GEN_ALL_CORE
[facility] for Tobacco Control website --- [email or web address] --- [email or web address]

## 2019-09-03 NOTE — DISCHARGE NOTE PROVIDER - NSDCCPCAREPLAN_GEN_ALL_CORE_FT
PRINCIPAL DISCHARGE DIAGNOSIS  Diagnosis: Cellulitis of leg, left  Assessment and Plan of Treatment: PRINCIPAL DISCHARGE DIAGNOSIS  Diagnosis: Cellulitis of leg, left  Assessment and Plan of Treatment:       SECONDARY DISCHARGE DIAGNOSES  Diagnosis: Hypercholesterolemia  Assessment and Plan of Treatment:     Diagnosis: Depression  Assessment and Plan of Treatment:     Diagnosis: Gastric reflux  Assessment and Plan of Treatment:     Diagnosis: HTN (hypertension)  Assessment and Plan of Treatment:     Diagnosis: Gout  Assessment and Plan of Treatment:

## 2019-09-03 NOTE — DISCHARGE NOTE PROVIDER - NSDCFUADDINST_GEN_ALL_CORE_FT
Wound care:  - Moisturize leg and wrap with bandage as instructed  - Elevate the leg when not walking    General Discharge Instructions:  Please resume all regular home medications unless specifically advised not to take a particular medication. Also, please take any new medications as prescribed.  Please get plenty of rest, continue to ambulate several times per day, and drink adequate amounts of fluids. Avoid lifting weights greater than 5-10 lbs until you follow-up with your surgeon, who will instruct you further regarding activity restrictions.  Avoid driving or operating heavy machinery while taking pain medications.  Please follow-up with your surgeon and Primary Care Provider (PCP) as advised.    Warning Signs:  Please call your doctor or nurse practitioner if you experience the following:  *You experience new chest pain, pressure, squeezing or tightness.  *New or worsening cough, shortness of breath, or wheeze.  *If you are vomiting and cannot keep down fluids or your medications.  *You are getting dehydrated due to continued vomiting, diarrhea, or other reasons. Signs of dehydration include dry mouth, rapid heartbeat, or feeling dizzy or faint when standing.  *You see blood or dark/black material when you vomit or have a bowel movement.  *You experience burning when you urinate, have blood in your urine, or experience a discharge.  *Your pain is not improving within 8-12 hours or is not gone within 24 hours. Call or return immediately if your pain is getting worse, changes location, or moves to your chest or back.  *You have shaking chills, or fever greater than 101.5 degrees Fahrenheit or 38 degrees Celsius.  *Any change in your symptoms, or any new symptoms that concern you. Wound care:  - Moisturize leg and place kerlix wrap then ace wrap daily  - Elevate the leg when not walking    Ambulation: as tolerated    Antibiotic plan: bactrim and augmentin for 7days  Avoid driving or operating heavy machinery while taking pain medications.  Please follow-up with your surgeon and Primary Care Provider (PCP) as advised.    Warning Signs:  Please call your doctor or nurse practitioner if you experience the following:  *You have shaking chills, or fever greater than 101.3 degrees Fahrenheit or 38 degrees Celsius.  *Any change in your symptoms, or any new symptoms that concern you.

## 2019-09-03 NOTE — PROGRESS NOTE ADULT - SUBJECTIVE AND OBJECTIVE BOX
24hr Events:  O/N: MARCI, VSS  9/2: Erythema and edema is improved but not ok yet. Stays for another course of Abx.       Assessment/Plan;  64y Male with hx of HTN, HLD, BPH, Depression who was referred from Dr Schumacher's office for IV Abx 2/2 to RLE Cellulitis, initially improving but now without marked improvement.    -IV Abx Vanc; Zosyn   -Home Meds  -Reg diet  -Compression, elevation of RLE  - Possibhle discharge 24hr Events:  O/N: MARCI, VSS  9/2: Erythema and edema is improved but not ok yet. Stays for another course of Abx.     piperacillin/tazobactam IVPB.. 3.375  vancomycin  IVPB 1500  doxazosin 2  enoxaparin Injectable 40  hydrochlorothiazide 25  lisinopril 5  piperacillin/tazobactam IVPB.. 3.375  vancomycin  IVPB 1500        Vital Signs Last 24 Hrs  T(C): 37 (03 Sep 2019 05:06), Max: 37 (03 Sep 2019 05:06)  T(F): 98.6 (03 Sep 2019 05:06), Max: 98.6 (03 Sep 2019 05:06)  HR: 70 (03 Sep 2019 01:52) (70 - 78)  BP: 130/86 (03 Sep 2019 01:52) (130/77 - 143/87)  BP(mean): --  RR: 17 (03 Sep 2019 01:52) (16 - 18)  SpO2: 97% (03 Sep 2019 01:52) (97% - 99%)  I&O's Summary    02 Sep 2019 07:01  -  03 Sep 2019 07:00  --------------------------------------------------------  IN: 1840 mL / OUT: 0 mL / NET: 1840 mL        Physical Exam:  General: NAD, resting comfortably in bed  Pulmonary: Nonlabored breathing, no respiratory distress  Extremities:  redness adn edema below knee on LLE. Improved but not resolved.       LABS:              PLEASE CHECK WHEN PRESENT:     [  ]Heart Failure     [  ] Acute     [  ] Acute on Chronic     [  ] Chronic  -------------------------------------------------------------------     [  ]Diastolic [HFpEF]     [  ]Systolic [HFrEF]     [  ]Combined [HFpEF & HFrEF]     [  ]Other:  -------------------------------------------------------------------  [ ] Respiratory failure  [ ] Acute cor pulmonale  [ ] Asthma/COPD Exacerbation  [ ] Pleural effusion  [ ] Aspiration pneumonia  -------------------------------------------------------------------  [  ]KEYUR     [  ]ATN     [  ]Reneal Medullary Necrosis     [  ]Renal Cortical Necrosis     [  ]Other Pathological Lesions:    [  ]CKD 1  [  ]CKD 2  [  ]CKD 3  [  ]CKD 4  [  ]CKD 5  [  ]Other  -------------------------------------------------------------------  [  ]Diabetes  [  ] Diabetic PVD Ulcer  [  ] Neuropathic ulcer to DM  [  ] Diabetes with Nephropathy  [  ] Osteomyelitis due to diabetes  --------------------------------------------------------------------  [  ]Malnutrition: See Nutrition Note  [  ]Cachexia  [  ]Other:   [  ]Supplement Ordered:  [  ]Morbid Obesity (BMI >=40]  ---------------------------------------------------------------------  [ ] Sepsis/severe sepsis/septic shock  [ ] UTI  [ ] Pneumonia  -----------------------------------------------------------------------  [ ] Acidosis/alkalosis  [ ] Fluid overload  [ ] Hypokalemia  [ ] Hyperkalemia  [ ] Hypomagnesemia  [ ] Hypophosphatemia  [ ] Hyperphosphatemia  ------------------------------------------------------------------------  [ ] Acute blood loss anemia  [ ] Post op blood loss anemia  [ ] Iron deficiency anemia  [ ] Anemia due to chronic disease  [ ] Hypercoagulable state  ----------------------------------------------------------------------  [ ] Cerebral infarction  [ ] Transient ischemia attack  [ ] Encephalopathy    Assessment/Plan;  64y Male with hx of HTN, HLD, BPH, Depression who was referred from Dr Schumacher's office for IV Abx 2/2 to RLE Cellulitis, initially improving but now without marked improvement.    -IV Abx Vanc; Zosyn   -Home Meds  -LSQ  -pain control  -Reg diet  -Compression, elevation of RLE  -Possible discharge

## 2019-09-04 ENCOUNTER — RX RENEWAL (OUTPATIENT)
Age: 64
End: 2019-09-04

## 2019-09-05 DIAGNOSIS — K21.9 GASTRO-ESOPHAGEAL REFLUX DISEASE WITHOUT ESOPHAGITIS: ICD-10-CM

## 2019-09-05 DIAGNOSIS — I10 ESSENTIAL (PRIMARY) HYPERTENSION: ICD-10-CM

## 2019-09-05 DIAGNOSIS — L03.116 CELLULITIS OF LEFT LOWER LIMB: ICD-10-CM

## 2019-09-05 DIAGNOSIS — N40.0 BENIGN PROSTATIC HYPERPLASIA WITHOUT LOWER URINARY TRACT SYMPTOMS: ICD-10-CM

## 2019-09-05 DIAGNOSIS — E78.5 HYPERLIPIDEMIA, UNSPECIFIED: ICD-10-CM

## 2019-09-05 DIAGNOSIS — F32.9 MAJOR DEPRESSIVE DISORDER, SINGLE EPISODE, UNSPECIFIED: ICD-10-CM

## 2019-09-05 DIAGNOSIS — M10.9 GOUT, UNSPECIFIED: ICD-10-CM

## 2019-09-13 ENCOUNTER — APPOINTMENT (OUTPATIENT)
Dept: VASCULAR SURGERY | Facility: CLINIC | Age: 64
End: 2019-09-13
Payer: MEDICARE

## 2019-09-13 PROCEDURE — 99212 OFFICE O/P EST SF 10 MIN: CPT

## 2019-09-13 RX ORDER — AMOXICILLIN AND CLAVULANATE POTASSIUM 875; 125 MG/1; MG/1
875-125 TABLET, COATED ORAL
Qty: 14 | Refills: 0 | Status: DISCONTINUED | COMMUNITY
Start: 1900-01-01 | End: 2019-09-13

## 2019-09-13 RX ORDER — CIPROFLOXACIN HYDROCHLORIDE 500 MG/1
500 TABLET, FILM COATED ORAL
Qty: 14 | Refills: 0 | Status: DISCONTINUED | COMMUNITY
Start: 2019-01-11 | End: 2019-09-13

## 2019-09-17 NOTE — HISTORY OF PRESENT ILLNESS
[FreeTextEntry1] : 65 yo M with hx of cellulitis of his LEs who was recently admitted with recurrent L leg cellulitis. Patient failed outpatient treatment and was started on IV antibiotics while in hospital. He returns today for a f/u. He is doing well now, he states that his edema/erythema subsided. He denies fever, chills. He is no longer on Abx's.

## 2019-09-17 NOTE — ASSESSMENT
[FreeTextEntry1] : 63 yo M with hx of cellulitis of his LEs who was recently admitted with recurrent L leg cellulitis.\par Bilateral legs edema is better controlled, mild residual erythema present.\par Patient was recommended to c/w compression stockings.\par He was prescribed Bactrim just in case he develops leg edema, redness again.\par He was recommended to start Abx's right away.\par Pt verbalized understanding.\par F/u prn.

## 2019-09-17 NOTE — REVIEW OF SYSTEMS
[As Noted in HPI] : as noted in HPI [Limb Swelling] : limb swelling [Negative] : Heme/Lymph [Fever] : no fever [Chills] : no chills [Leg Claudication] : no intermittent leg claudication [Limb Pain] : no limb pain

## 2019-09-17 NOTE — PHYSICAL EXAM
[Normal Breath Sounds] : Normal breath sounds [Normal Heart Sounds] : normal heart sounds [Normal Rate and Rhythm] : normal rate and rhythm [2+] : left 2+ [Ankle Swelling (On Exam)] : present [Ankle Swelling Bilaterally] : bilaterally  [Ankle Swelling On The Right] : mild [Alert] : alert [No Rash or Lesion] : No rash or lesion [JVD] : no jugular venous distention  [Varicose Veins Of Lower Extremities] : not present [] : not present [Abdomen Tenderness] : ~T ~M No abdominal tenderness [de-identified] : NAD [de-identified] : + FROM [de-identified] : NC/AT [de-identified] : B/l LEs: + mild erythema, no skin breakdown

## 2019-10-13 PROCEDURE — 86803 HEPATITIS C AB TEST: CPT

## 2019-10-13 PROCEDURE — 80048 BASIC METABOLIC PNL TOTAL CA: CPT

## 2019-10-13 PROCEDURE — 86850 RBC ANTIBODY SCREEN: CPT

## 2019-10-13 PROCEDURE — 99285 EMERGENCY DEPT VISIT HI MDM: CPT | Mod: 25

## 2019-10-13 PROCEDURE — 96374 THER/PROPH/DIAG INJ IV PUSH: CPT

## 2019-10-13 PROCEDURE — 86901 BLOOD TYPING SEROLOGIC RH(D): CPT

## 2019-10-13 PROCEDURE — 85730 THROMBOPLASTIN TIME PARTIAL: CPT

## 2019-10-13 PROCEDURE — 93971 EXTREMITY STUDY: CPT

## 2019-10-13 PROCEDURE — 80202 ASSAY OF VANCOMYCIN: CPT

## 2019-10-13 PROCEDURE — 36415 COLL VENOUS BLD VENIPUNCTURE: CPT

## 2019-10-13 PROCEDURE — 80053 COMPREHEN METABOLIC PANEL: CPT

## 2019-10-13 PROCEDURE — 84100 ASSAY OF PHOSPHORUS: CPT

## 2019-10-13 PROCEDURE — 83735 ASSAY OF MAGNESIUM: CPT

## 2019-10-13 PROCEDURE — 86900 BLOOD TYPING SEROLOGIC ABO: CPT

## 2019-10-13 PROCEDURE — 71045 X-RAY EXAM CHEST 1 VIEW: CPT

## 2019-10-13 PROCEDURE — 85025 COMPLETE CBC W/AUTO DIFF WBC: CPT

## 2019-10-13 PROCEDURE — 85610 PROTHROMBIN TIME: CPT

## 2019-10-13 PROCEDURE — 87040 BLOOD CULTURE FOR BACTERIA: CPT

## 2019-12-20 ENCOUNTER — RX RENEWAL (OUTPATIENT)
Age: 64
End: 2019-12-20

## 2020-03-03 ENCOUNTER — FORM ENCOUNTER (OUTPATIENT)
Age: 65
End: 2020-03-03

## 2020-03-04 ENCOUNTER — APPOINTMENT (OUTPATIENT)
Dept: ORTHOPEDIC SURGERY | Facility: CLINIC | Age: 65
End: 2020-03-04
Payer: MEDICARE

## 2020-03-04 ENCOUNTER — OUTPATIENT (OUTPATIENT)
Dept: OUTPATIENT SERVICES | Facility: HOSPITAL | Age: 65
LOS: 1 days | End: 2020-03-04
Payer: MEDICARE

## 2020-03-04 DIAGNOSIS — T14.8 OTHER INJURY OF UNSPECIFIED BODY REGION: Chronic | ICD-10-CM

## 2020-03-04 DIAGNOSIS — T24.309A BURN OF THIRD DEGREE OF UNSPECIFIED SITE OF UNSPECIFIED LOWER LIMB, EXCEPT ANKLE AND FOOT, INITIAL ENCOUNTER: Chronic | ICD-10-CM

## 2020-03-04 PROCEDURE — 73521 X-RAY EXAM HIPS BI 2 VIEWS: CPT | Mod: 26

## 2020-03-04 PROCEDURE — 73562 X-RAY EXAM OF KNEE 3: CPT

## 2020-03-04 PROCEDURE — 73521 X-RAY EXAM HIPS BI 2 VIEWS: CPT

## 2020-03-04 PROCEDURE — 99203 OFFICE O/P NEW LOW 30 MIN: CPT

## 2020-03-04 PROCEDURE — 73562 X-RAY EXAM OF KNEE 3: CPT | Mod: 26,50

## 2020-03-04 RX ORDER — SULFAMETHOXAZOLE AND TRIMETHOPRIM 800; 160 MG/1; MG/1
800-160 TABLET ORAL TWICE DAILY
Qty: 20 | Refills: 0 | Status: DISCONTINUED | COMMUNITY
Start: 2019-09-13 | End: 2020-03-04

## 2020-03-13 RX ORDER — SILVER SULFADIAZINE 10 MG/G
1 CREAM TOPICAL DAILY
Qty: 1 | Refills: 1 | Status: COMPLETED | COMMUNITY
Start: 2019-01-11 | End: 2020-03-13

## 2020-04-07 ENCOUNTER — APPOINTMENT (OUTPATIENT)
Dept: ORTHOPEDIC SURGERY | Facility: HOSPITAL | Age: 65
End: 2020-04-07

## 2020-05-06 NOTE — HISTORY OF PRESENT ILLNESS
[de-identified] : Sy is a 64 year old male here for initial visit of bilateral hip and knee pain (R>L). Pain is described as a sharp sensation, rated 8/10. Pain is better at rest and worse with bending. He has a past medical history of AVN diagnosed by Dr.Steven Adan in both hips and knees of 2018. Patient has cellulitis in bilateral lower extremities. He is currently on antibiotics, treated by Dr. Amin. \par

## 2020-05-06 NOTE — ASSESSMENT
[FreeTextEntry1] : Assessment/Plan\par \par Osteonecrosis\par \par Will schedule a right knee scope core decompression VS OATS\par Will schedule a right hip core decompression with cells VS bone graft\par \par Patient will benefit from the proposed procedure, she has failed a conservative treatment plan of supervised physical therapy, anti-inflammatory medications, and activity modification. She continues to have pain impacting her ability to perform ADL's and has a decreasing QoL. We will schedule this for the earliest mutually convenient time after the appropriate pre-op laboratory tests and clearances are obtained.  All questions were answered and a thorough explanation of RBA were given\par \par F/U when schedules for surgery \par \par Given the COVID19 pandemic, we have discussed the RBA of proceeding with this case with the patient.\par \par Delay or further delay beyond 4 weeks risks significant patient harm, would prolong the current hospital stay, increase the likelihood of a future hospital admission or make later surgery more complex or risk.  This case is appropriately indicated due to intractable pain, increasing need for opioid medications, failure to respond to an appropriate course of non-operative treatment, patient may begin to experience functional loss of ADLs, neurological deficits, or other potential adverse outcomes by delaying this procedure.\par \par All medical record entries made by the PA/Victorino/Fellow are at my, Dr. Sy Jordan's direction and personally dictated by me on 03/04/2020]. I have reviewed the chart and agree that the record accurately reflects my personal performance of the history, physical exam, assessment, and plan. I have also personally directed reviewed, and agreed with the chart.\par

## 2020-05-06 NOTE — END OF VISIT
[FreeTextEntry3] : By signing my name below, I, Shirlene Monteiro, attest that this documentation has been prepared under the direction and in the presence of Daniel Lenz PA-C.\par

## 2020-07-20 ENCOUNTER — LABORATORY RESULT (OUTPATIENT)
Age: 65
End: 2020-07-20

## 2020-07-20 ENCOUNTER — OUTPATIENT (OUTPATIENT)
Dept: OUTPATIENT SERVICES | Facility: HOSPITAL | Age: 65
LOS: 1 days | End: 2020-07-20
Payer: MEDICARE

## 2020-07-20 ENCOUNTER — RESULT REVIEW (OUTPATIENT)
Age: 65
End: 2020-07-20

## 2020-07-20 ENCOUNTER — APPOINTMENT (OUTPATIENT)
Dept: VASCULAR SURGERY | Facility: CLINIC | Age: 65
End: 2020-07-20
Payer: MEDICARE

## 2020-07-20 ENCOUNTER — APPOINTMENT (OUTPATIENT)
Dept: ORTHOPEDIC SURGERY | Facility: CLINIC | Age: 65
End: 2020-07-20
Payer: MEDICARE

## 2020-07-20 VITALS
DIASTOLIC BLOOD PRESSURE: 72 MMHG | BODY MASS INDEX: 30.49 KG/M2 | HEART RATE: 78 BPM | HEIGHT: 70 IN | OXYGEN SATURATION: 97 % | SYSTOLIC BLOOD PRESSURE: 130 MMHG | WEIGHT: 213 LBS

## 2020-07-20 VITALS — TEMPERATURE: 97.6 F

## 2020-07-20 DIAGNOSIS — T14.8 OTHER INJURY OF UNSPECIFIED BODY REGION: Chronic | ICD-10-CM

## 2020-07-20 DIAGNOSIS — T24.309A BURN OF THIRD DEGREE OF UNSPECIFIED SITE OF UNSPECIFIED LOWER LIMB, EXCEPT ANKLE AND FOOT, INITIAL ENCOUNTER: Chronic | ICD-10-CM

## 2020-07-20 DIAGNOSIS — Z78.9 OTHER SPECIFIED HEALTH STATUS: ICD-10-CM

## 2020-07-20 DIAGNOSIS — Z82.49 FAMILY HISTORY OF ISCHEMIC HEART DISEASE AND OTHER DISEASES OF THE CIRCULATORY SYSTEM: ICD-10-CM

## 2020-07-20 DIAGNOSIS — G57.32 LESION OF LATERAL POPLITEAL NERVE, LEFT LOWER LIMB: ICD-10-CM

## 2020-07-20 DIAGNOSIS — Z01.818 ENCOUNTER FOR OTHER PREPROCEDURAL EXAMINATION: ICD-10-CM

## 2020-07-20 LAB
ANION GAP SERPL CALC-SCNC: 12 MMOL/L — SIGNIFICANT CHANGE UP (ref 5–17)
APPEARANCE UR: CLEAR — SIGNIFICANT CHANGE UP
APTT BLD: 34.5 SEC — SIGNIFICANT CHANGE UP (ref 27.5–35.5)
BILIRUB UR-MCNC: NEGATIVE — SIGNIFICANT CHANGE UP
BUN SERPL-MCNC: 12 MG/DL — SIGNIFICANT CHANGE UP (ref 7–23)
CALCIUM SERPL-MCNC: 9.9 MG/DL — SIGNIFICANT CHANGE UP (ref 8.4–10.5)
CHLORIDE SERPL-SCNC: 101 MMOL/L — SIGNIFICANT CHANGE UP (ref 96–108)
CO2 SERPL-SCNC: 29 MMOL/L — SIGNIFICANT CHANGE UP (ref 22–31)
COLOR SPEC: YELLOW — SIGNIFICANT CHANGE UP
CREAT SERPL-MCNC: 1.02 MG/DL — SIGNIFICANT CHANGE UP (ref 0.5–1.3)
DIFF PNL FLD: NEGATIVE — SIGNIFICANT CHANGE UP
GLUCOSE SERPL-MCNC: 107 MG/DL — HIGH (ref 70–99)
GLUCOSE UR QL: NEGATIVE — SIGNIFICANT CHANGE UP
HCT VFR BLD CALC: 47.6 % — SIGNIFICANT CHANGE UP (ref 39–50)
HGB BLD-MCNC: 15.5 G/DL — SIGNIFICANT CHANGE UP (ref 13–17)
INR BLD: 1.07 — SIGNIFICANT CHANGE UP (ref 0.88–1.16)
KETONES UR-MCNC: NEGATIVE — SIGNIFICANT CHANGE UP
LEUKOCYTE ESTERASE UR-ACNC: NEGATIVE — SIGNIFICANT CHANGE UP
MCHC RBC-ENTMCNC: 31.4 PG — SIGNIFICANT CHANGE UP (ref 27–34)
MCHC RBC-ENTMCNC: 32.6 GM/DL — SIGNIFICANT CHANGE UP (ref 32–36)
MCV RBC AUTO: 96.6 FL — SIGNIFICANT CHANGE UP (ref 80–100)
NITRITE UR-MCNC: NEGATIVE — SIGNIFICANT CHANGE UP
NRBC # BLD: 0 /100 WBCS — SIGNIFICANT CHANGE UP (ref 0–0)
PH UR: 7 — SIGNIFICANT CHANGE UP (ref 5–8)
PLATELET # BLD AUTO: 273 K/UL — SIGNIFICANT CHANGE UP (ref 150–400)
POTASSIUM SERPL-MCNC: 4.1 MMOL/L — SIGNIFICANT CHANGE UP (ref 3.5–5.3)
POTASSIUM SERPL-SCNC: 4.1 MMOL/L — SIGNIFICANT CHANGE UP (ref 3.5–5.3)
PROT UR-MCNC: NEGATIVE MG/DL — SIGNIFICANT CHANGE UP
PROTHROM AB SERPL-ACNC: 12.8 SEC — SIGNIFICANT CHANGE UP (ref 10.6–13.6)
RBC # BLD: 4.93 M/UL — SIGNIFICANT CHANGE UP (ref 4.2–5.8)
RBC # FLD: 13.6 % — SIGNIFICANT CHANGE UP (ref 10.3–14.5)
SODIUM SERPL-SCNC: 142 MMOL/L — SIGNIFICANT CHANGE UP (ref 135–145)
SP GR SPEC: 1.01 — SIGNIFICANT CHANGE UP (ref 1–1.03)
UROBILINOGEN FLD QL: 0.2 E.U./DL — SIGNIFICANT CHANGE UP
WBC # BLD: 9.18 K/UL — SIGNIFICANT CHANGE UP (ref 3.8–10.5)
WBC # FLD AUTO: 9.18 K/UL — SIGNIFICANT CHANGE UP (ref 3.8–10.5)

## 2020-07-20 PROCEDURE — 81003 URINALYSIS AUTO W/O SCOPE: CPT

## 2020-07-20 PROCEDURE — 73562 X-RAY EXAM OF KNEE 3: CPT

## 2020-07-20 PROCEDURE — 73562 X-RAY EXAM OF KNEE 3: CPT | Mod: 26,50

## 2020-07-20 PROCEDURE — 93010 ELECTROCARDIOGRAM REPORT: CPT

## 2020-07-20 PROCEDURE — 93005 ELECTROCARDIOGRAM TRACING: CPT

## 2020-07-20 PROCEDURE — 73521 X-RAY EXAM HIPS BI 2 VIEWS: CPT

## 2020-07-20 PROCEDURE — 99214 OFFICE O/P EST MOD 30 MIN: CPT

## 2020-07-20 PROCEDURE — 85027 COMPLETE CBC AUTOMATED: CPT

## 2020-07-20 PROCEDURE — 85730 THROMBOPLASTIN TIME PARTIAL: CPT

## 2020-07-20 PROCEDURE — 73521 X-RAY EXAM HIPS BI 2 VIEWS: CPT | Mod: 26

## 2020-07-20 PROCEDURE — 80048 BASIC METABOLIC PNL TOTAL CA: CPT

## 2020-07-20 PROCEDURE — 85610 PROTHROMBIN TIME: CPT

## 2020-07-20 RX ORDER — FAMOTIDINE 20 MG/1
20 TABLET, FILM COATED ORAL
Refills: 0 | Status: ACTIVE | COMMUNITY

## 2020-07-20 RX ORDER — SIMVASTATIN 10 MG/1
10 TABLET, FILM COATED ORAL
Refills: 0 | Status: ACTIVE | COMMUNITY

## 2020-07-20 RX ORDER — AMOXICILLIN AND CLAVULANATE POTASSIUM 875; 125 MG/1; MG/1
875-125 TABLET, COATED ORAL
Qty: 14 | Refills: 0 | Status: DISCONTINUED | COMMUNITY
Start: 2020-03-04 | End: 2020-07-20

## 2020-07-20 RX ORDER — BISMUTH TRIBROMOPH/PETROLATUM 1"X8"
BANDAGE TOPICAL
Qty: 10 | Refills: 2 | Status: DISCONTINUED | COMMUNITY
Start: 2019-01-17 | End: 2020-07-20

## 2020-07-20 RX ORDER — HYDROCHLOROTHIAZIDE 25 MG/1
25 TABLET ORAL
Refills: 0 | Status: ACTIVE | COMMUNITY

## 2020-07-20 RX ORDER — LISINOPRIL 5 MG/1
5 TABLET ORAL
Refills: 0 | Status: ACTIVE | COMMUNITY

## 2020-07-20 RX ORDER — DOXAZOSIN 2 MG/1
2 TABLET ORAL
Refills: 0 | Status: ACTIVE | COMMUNITY

## 2020-07-20 RX ORDER — MUPIROCIN 20 MG/G
2 OINTMENT TOPICAL
Qty: 1 | Refills: 0 | Status: DISCONTINUED | COMMUNITY
Start: 2020-03-13 | End: 2020-07-20

## 2020-07-20 RX ORDER — ALLOPURINOL 300 MG/1
300 TABLET ORAL
Refills: 0 | Status: ACTIVE | COMMUNITY

## 2020-07-20 NOTE — PHYSICAL EXAM
[No Acute Distress] : no acute distress [Well Nourished] : well nourished [Well Developed] : well developed [Well-Appearing] : well-appearing [Normal Sclera/Conjunctiva] : normal sclera/conjunctiva [EOMI] : extraocular movements intact [PERRL] : pupils equal round and reactive to light [Normal Outer Ear/Nose] : the outer ears and nose were normal in appearance [Normal Oropharynx] : the oropharynx was normal [No JVD] : no jugular venous distention [No Lymphadenopathy] : no lymphadenopathy [Supple] : supple [Thyroid Normal, No Nodules] : the thyroid was normal and there were no nodules present [No Respiratory Distress] : no respiratory distress  [No Accessory Muscle Use] : no accessory muscle use [Regular Rhythm] : with a regular rhythm [Normal Rate] : normal rate  [Clear to Auscultation] : lungs were clear to auscultation bilaterally [No Murmur] : no murmur heard [Normal S1, S2] : normal S1 and S2 [No Carotid Bruits] : no carotid bruits [No Varicosities] : no varicosities [No Abdominal Bruit] : a ~M bruit was not heard ~T in the abdomen [Pedal Pulses Present] : the pedal pulses are present [No Palpable Aorta] : no palpable aorta [No Edema] : there was no peripheral edema [No Extremity Clubbing/Cyanosis] : no extremity clubbing/cyanosis [Soft] : abdomen soft [Non Tender] : non-tender [Non-distended] : non-distended [No Masses] : no abdominal mass palpated [No HSM] : no HSM [Normal Posterior Cervical Nodes] : no posterior cervical lymphadenopathy [Normal Bowel Sounds] : normal bowel sounds [Normal Anterior Cervical Nodes] : no anterior cervical lymphadenopathy [No Spinal Tenderness] : no spinal tenderness [No Joint Swelling] : no joint swelling [No CVA Tenderness] : no CVA  tenderness [Coordination Grossly Intact] : coordination grossly intact [Grossly Normal Strength/Tone] : grossly normal strength/tone [Deep Tendon Reflexes (DTR)] : deep tendon reflexes were 2+ and symmetric [Normal Gait] : normal gait [No Focal Deficits] : no focal deficits [Normal Insight/Judgement] : insight and judgment were intact [Normal Affect] : the affect was normal [de-identified] : decreased hip ROM [de-identified] : chronic RLE venosu stasis changes, LLE cellulitis changes

## 2020-07-20 NOTE — HISTORY OF PRESENT ILLNESS
[No Pertinent Cardiac History] : no history of aortic stenosis, atrial fibrillation, coronary artery disease, recent myocardial infarction, or implantable device/pacemaker [No Adverse Anesthesia Reaction] : no adverse anesthesia reaction in self or family member [No Pertinent Pulmonary History] : no history of asthma, COPD, sleep apnea, or smoking [(Patient denies any chest pain, claudication, dyspnea on exertion, orthopnea, palpitations or syncope)] : Patient denies any chest pain, claudication, dyspnea on exertion, orthopnea, palpitations or syncope [Moderate (4-6 METs)] : Moderate (4-6 METs) [Chronic Anticoagulation] : no chronic anticoagulation [Chronic Kidney Disease] : no chronic kidney disease [FreeTextEntry1] : Right Knee Scope Core Decompression vs. OATS [FreeTextEntry2] : 7/23/2020 [Diabetes] : no diabetes [FreeTextEntry4] : The patient is a 65 year old man presenting with right knee pain secondary to osteonecrosis . The patient is planned for Right Knee Scope Core Decompression vs. OATS with Dr. Sy Jordan on 7/23/2020.\par \par He has a history of recurrent lower extremity cellulitis and follows with vascular surgery, Dr. Schumacher.  In the past, he had responded to oral antibiotics.  Over the last few months, he has noticed recurrence of left lower extremity swelling and erythema.  It causes significant pain.  The patient denies constitutional symptoms including fever, chills, malaise, weight loss, night sweats.\par \par \par METS >4\par \par The patient denies recent/active cardiopulmonary symptoms including chest pain, shortness of breath, dyspnea of exertion, palpitations, swelling, headache, dizziness, syncope.\par \par The patient denies recent COVID exposure or COVID-related symptoms.\par \par  [FreeTextEntry5] : HTN [FreeTextEntry3] : Dr. Sy Jordan

## 2020-07-20 NOTE — ASSESSMENT
[Patient Optimized for Surgery] : Patient optimized for surgery [Other: _____] : [unfilled] [As per surgery] : as per surgery [FreeTextEntry4] : The patient is a 65 year old man presenting with right knee pain secondary to osteonecrosis . The patient is planned for Right Knee Scope Core Decompression vs. OATS with Dr. Sy Jordan on 7/23/2020.\par \par Patient has a history of recurrent lower extremity cellulitis, with more symptomatic left lower extremity cellulitis today.  The patient denies constitutional symptoms including fever, chills, malaise, weight loss, night sweats.  Labs reviewed and no evidence of leukocytosis.  I recommended that the patient follow-up with his vascular surgeon to assess his lower extremity cellulitis.  If patient unable to make follow-up with vascular surgery prior to surgery, we discussed empiric antibiotics with Bactrim.\par \par -Labs/Diagnostics reviewed with patient in detail\par -METS >4\par -RCRI = 0, 3.9% 30-day risk of death/MI/cardiac arrest\par \par \par Patient is at low risk for moderate surgery/procedure\par \par Pending COVID screen and vascular surgery consultation, Patient is MEDICALLY OPTIMIZED TO PROCEED WITH PROPOSED SURGERY.\par \par \par Recommendations:\par -Consider holding ACE-I and diuretics on day of surgery, and if he requires hospitalization\par -May continue other home medications\par -Pending vascular surgery recs, may consider empiric PO antibiotic therapy with Bactrim prior to surgery\par

## 2020-07-21 NOTE — ASSESSMENT
[Ulcer Care] : ulcer care [FreeTextEntry1] : 65 year old male with a history of recurrent lower extremity cellulitis  noticed recurrence of left lower extremity swelling and erythema a month ago.\par LLE recurrent ulcerations and cellulitis.\par Silvadene ointment was applied over ulcerations, covered with xeroform and calamine unna boot was strapped.\par Patient will start Augmentin for 1 week.\par He was recommended to postpone his procedure with Dr. Jordan until his cellulitis resolves.\par If any fever, chills, he was recommended to go to ED.\par F/u in 1 week.

## 2020-07-21 NOTE — HISTORY OF PRESENT ILLNESS
[FreeTextEntry1] : 65 year old male with a history of recurrent lower extremity cellulitis  noticed recurrence of left lower extremity swelling and erythema a month ago. Patient tried to manage it himself since he had similar episodes in the past. Today he was seen by his PCP who urgently sent him here for an evaluation. \par Patient c/o significant pain over his left lower leg and worsened edema. No fever, chills.\par \par The patient is planned for Right Knee Scope Core Decompression vs. OATS with Dr. Sy Jordan on 7/23/2020 due to osteonecrosis.

## 2020-07-21 NOTE — PROCEDURE
[FreeTextEntry1] : LLE: Silvadene ointment was applied over ulcerations, covered with xeroform and calamine unna boot was strapped.

## 2020-07-21 NOTE — REVIEW OF SYSTEMS
[As noted in HPI] : as noted in HPI [Lower Ext Edema] : lower extremity edema [Limb Pain] : limb pain [Limb Swelling] : limb swelling [As Noted in HPI] : as noted in HPI [Skin Lesions] : skin lesion [Negative] : Heme/Lymph [Chills] : no chills [Leg Claudication] : no intermittent leg claudication [Fever] : no fever

## 2020-07-21 NOTE — PHYSICAL EXAM
[2+] : left 2+ [Ankle Swelling (On Exam)] : present [Ankle Swelling On The Left] : moderate [Alert] : alert [Varicose Veins Of Lower Extremities] : not present [] : not present [de-identified] : WN/WD, uncomfortable due to pain [Abdomen Tenderness] : ~T ~M No abdominal tenderness [de-identified] : LLE: + superficial ulcerations over anterior shin, + mild erythema and worsened edema.

## 2020-07-23 ENCOUNTER — APPOINTMENT (OUTPATIENT)
Dept: ORTHOPEDIC SURGERY | Facility: HOSPITAL | Age: 65
End: 2020-07-23

## 2020-07-27 ENCOUNTER — APPOINTMENT (OUTPATIENT)
Dept: VASCULAR SURGERY | Facility: CLINIC | Age: 65
End: 2020-07-27
Payer: MEDICARE

## 2020-07-27 PROCEDURE — 29580 STRAPPING UNNA BOOT: CPT | Mod: LT

## 2020-07-27 PROCEDURE — 99214 OFFICE O/P EST MOD 30 MIN: CPT | Mod: 25

## 2020-07-27 NOTE — HISTORY OF PRESENT ILLNESS
[FreeTextEntry1] : 65 year old male with a history of recurrent lower extremity cellulitis that he developed last week. He was seen in the office at that time, unna boot was strapped and she was started on oral antibiotics. He returns today for a f/u. \par Patient states that pain subsided, edema is better controlled. He completed antibiotics. No fever, chills.\par \par

## 2020-07-27 NOTE — PHYSICAL EXAM
[2+] : left 2+ [Ankle Swelling (On Exam)] : present [Ankle Swelling On The Left] : moderate [Alert] : alert [Varicose Veins Of Lower Extremities] : not present [] : not present [Abdomen Tenderness] : ~T ~M No abdominal tenderness [de-identified] : WN/WD, NAD [de-identified] : LLE: + superficial ulcerations over anterior shin, +  erythema subsided and better controlled edema.

## 2020-07-27 NOTE — REVIEW OF SYSTEMS
[As noted in HPI] : as noted in HPI [Lower Ext Edema] : lower extremity edema [Limb Pain] : limb pain [Limb Swelling] : limb swelling [As Noted in HPI] : as noted in HPI [Skin Lesions] : skin lesion [Negative] : Heme/Lymph [Fever] : no fever [Chills] : no chills [Leg Claudication] : no intermittent leg claudication

## 2020-07-27 NOTE — ASSESSMENT
[Ulcer Care] : ulcer care [FreeTextEntry1] : 65 year old male with  recurrent left lower extremity cellulitis returns for a f/u.\par LLE recurrent ulcerations that are getting better, less erythema and edema is better controlled.\par Silvadene ointment was applied over ulcerations, covered with xeroform and calamine unna boot was strapped.\par F/u in 1 week.

## 2020-08-03 ENCOUNTER — APPOINTMENT (OUTPATIENT)
Dept: VASCULAR SURGERY | Facility: CLINIC | Age: 65
End: 2020-08-03
Payer: MEDICARE

## 2020-08-03 PROCEDURE — 99213 OFFICE O/P EST LOW 20 MIN: CPT

## 2020-08-12 NOTE — REVIEW OF SYSTEMS
[As noted in HPI] : as noted in HPI [Limb Pain] : limb pain [Lower Ext Edema] : lower extremity edema [As Noted in HPI] : as noted in HPI [Limb Swelling] : limb swelling [Skin Lesions] : skin lesion [Negative] : Endocrine [Fever] : no fever [Chills] : no chills [Leg Claudication] : no intermittent leg claudication

## 2020-08-12 NOTE — HISTORY OF PRESENT ILLNESS
[FreeTextEntry1] : 65 year old male with a history of recurrent lower extremity cellulitis that he developed at the end of July. He has been tolerating unna boots and finished course of antibiotics.\par He returns today for a f/u. Patient states that pain subsided, edema is better controlled. No fever, chills. He noticed small ulcerations over his right pretibial area and wants to know if he should continue with abx's.\par \par

## 2020-08-12 NOTE — PHYSICAL EXAM
[2+] : left 2+ [Ankle Swelling (On Exam)] : present [Ankle Swelling On The Left] : moderate [Alert] : alert [Ankle Swelling Bilaterally] : bilaterally  [Calm] : calm [Varicose Veins Of Lower Extremities] : not present [] : not present [Abdomen Tenderness] : ~T ~M No abdominal tenderness [de-identified] : WN/WD, NAD [de-identified] : LLE: ulcerations over anterior shin healed, +  erythema subsided; RLE + two small pretibial ulcers, no signs of infection

## 2020-08-17 ENCOUNTER — LABORATORY RESULT (OUTPATIENT)
Age: 65
End: 2020-08-17

## 2020-08-17 ENCOUNTER — APPOINTMENT (OUTPATIENT)
Dept: ORTHOPEDIC SURGERY | Facility: CLINIC | Age: 65
End: 2020-08-17
Payer: MEDICARE

## 2020-08-17 VITALS
WEIGHT: 213 LBS | SYSTOLIC BLOOD PRESSURE: 140 MMHG | OXYGEN SATURATION: 97 % | DIASTOLIC BLOOD PRESSURE: 85 MMHG | HEIGHT: 70 IN | HEART RATE: 72 BPM | TEMPERATURE: 97.2 F | BODY MASS INDEX: 30.49 KG/M2

## 2020-08-17 PROCEDURE — 99214 OFFICE O/P EST MOD 30 MIN: CPT

## 2020-08-18 NOTE — PHYSICAL EXAM
[No Acute Distress] : no acute distress [Well Nourished] : well nourished [Well Developed] : well developed [Well-Appearing] : well-appearing [Normal Sclera/Conjunctiva] : normal sclera/conjunctiva [PERRL] : pupils equal round and reactive to light [EOMI] : extraocular movements intact [Normal Outer Ear/Nose] : the outer ears and nose were normal in appearance [Normal Oropharynx] : the oropharynx was normal [No JVD] : no jugular venous distention [No Lymphadenopathy] : no lymphadenopathy [Supple] : supple [Thyroid Normal, No Nodules] : the thyroid was normal and there were no nodules present [No Respiratory Distress] : no respiratory distress  [No Accessory Muscle Use] : no accessory muscle use [Clear to Auscultation] : lungs were clear to auscultation bilaterally [Normal Rate] : normal rate  [Regular Rhythm] : with a regular rhythm [Normal S1, S2] : normal S1 and S2 [No Murmur] : no murmur heard [No Carotid Bruits] : no carotid bruits [No Abdominal Bruit] : a ~M bruit was not heard ~T in the abdomen [No Varicosities] : no varicosities [Pedal Pulses Present] : the pedal pulses are present [No Edema] : there was no peripheral edema [No Palpable Aorta] : no palpable aorta [No Extremity Clubbing/Cyanosis] : no extremity clubbing/cyanosis [Soft] : abdomen soft [Non Tender] : non-tender [Non-distended] : non-distended [No Masses] : no abdominal mass palpated [No HSM] : no HSM [Normal Bowel Sounds] : normal bowel sounds [Normal Posterior Cervical Nodes] : no posterior cervical lymphadenopathy [Normal Anterior Cervical Nodes] : no anterior cervical lymphadenopathy [No CVA Tenderness] : no CVA  tenderness [No Spinal Tenderness] : no spinal tenderness [No Joint Swelling] : no joint swelling [Grossly Normal Strength/Tone] : grossly normal strength/tone [Coordination Grossly Intact] : coordination grossly intact [No Focal Deficits] : no focal deficits [Normal Gait] : normal gait [Normal Affect] : the affect was normal [Deep Tendon Reflexes (DTR)] : deep tendon reflexes were 2+ and symmetric [Normal Insight/Judgement] : insight and judgment were intact [de-identified] : decreased hip ROM [de-identified] : chronic RLE/LLE venous stasis changes

## 2020-08-18 NOTE — HISTORY OF PRESENT ILLNESS
[No Pertinent Cardiac History] : no history of aortic stenosis, atrial fibrillation, coronary artery disease, recent myocardial infarction, or implantable device/pacemaker [No Pertinent Pulmonary History] : no history of asthma, COPD, sleep apnea, or smoking [No Adverse Anesthesia Reaction] : no adverse anesthesia reaction in self or family member [(Patient denies any chest pain, claudication, dyspnea on exertion, orthopnea, palpitations or syncope)] : Patient denies any chest pain, claudication, dyspnea on exertion, orthopnea, palpitations or syncope [Moderate (4-6 METs)] : Moderate (4-6 METs) [Chronic Anticoagulation] : no chronic anticoagulation [Diabetes] : no diabetes [Chronic Kidney Disease] : no chronic kidney disease [FreeTextEntry1] : Right Hip Scope Core Decompression [FreeTextEntry2] : 8/20/2020 [FreeTextEntry3] : Dr. Sy Jordan [FreeTextEntry4] : The patient is a 65 year old man presenting with right hip pain secondary to osteonecrosis . The patient is planned for Right HipCore Decompression with Dr. Sy Jordan on 8/20/2020.\par \par He has a history of recurrent lower extremity cellulitis and follows with vascular surgery, Dr. Schumacher.  I initially saw the patient for a preoperative clearance on 7/20/2020 for preoperative clearance, but he had a recurrence of lower extremity cellulitis.  He was referred to Dr. Schumacher, and was managed with unna boots, wound care, and antibiotics.  He has been cleared from Vascular Surgery standpoint, and his cellulitis/lower extremity edema is much improved.  The patient denies constitutional symptoms including fever, chills, malaise, weight loss, night sweats.\par \par \par METS >4\par \par The patient denies recent/active cardiopulmonary symptoms including chest pain, shortness of breath, dyspnea of exertion, palpitations, swelling, headache, dizziness, syncope.\par \par The patient denies recent COVID exposure or COVID-related symptoms.\par \par  [FreeTextEntry5] : HTN

## 2020-08-18 NOTE — ASSESSMENT
[Patient Optimized for Surgery] : Patient optimized for surgery [Other: _____] : [unfilled] [As per surgery] : as per surgery [No Further Testing Recommended] : no further testing recommended [FreeTextEntry4] : The patient is a 65 year old man presenting with right hip pain secondary to osteonecrosis . The patient is planned for Right Hip Core Decompression with Dr. Sy Jordan on 8/20/2020.\par \par He has a history of recurrent lower extremity cellulitis and follows with vascular surgery, Dr. Schumacher.  He has recovered from his most recent episode of cellulitis, and has been cleared from Vascular Surgery standpoint.\par \par -Labs/Diagnostics reviewed with patient in detail\par -METS >4\par -RCRI = 0, 3.9% 30-day risk of death/MI/cardiac arrest\par \par \par Patient is at low risk for moderate surgery/procedure\par \par Pending COVID screen, Patient is MEDICALLY OPTIMIZED TO PROCEED WITH PROPOSED SURGERY.\par \par \par Recommendations:\par -Consider holding ACE-I and diuretics on day of surgery, and if he requires hospitalization\par -May continue other home medications\par \par

## 2020-08-19 ENCOUNTER — TRANSCRIPTION ENCOUNTER (OUTPATIENT)
Age: 65
End: 2020-08-19
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
## 2020-08-20 ENCOUNTER — OUTPATIENT (OUTPATIENT)
Dept: OUTPATIENT SERVICES | Facility: HOSPITAL | Age: 65
LOS: 1 days | Discharge: HOME | End: 2020-08-20
Payer: MEDICARE

## 2020-08-20 ENCOUNTER — APPOINTMENT (OUTPATIENT)
Dept: ORTHOPEDIC SURGERY | Facility: HOSPITAL | Age: 65
End: 2020-08-20
Payer: MEDICARE

## 2020-08-20 VITALS
WEIGHT: 229.5 LBS | HEIGHT: 69 IN | TEMPERATURE: 98 F | HEART RATE: 79 BPM | DIASTOLIC BLOOD PRESSURE: 82 MMHG | SYSTOLIC BLOOD PRESSURE: 118 MMHG | RESPIRATION RATE: 18 BRPM | OXYGEN SATURATION: 95 %

## 2020-08-20 VITALS
DIASTOLIC BLOOD PRESSURE: 88 MMHG | RESPIRATION RATE: 15 BRPM | HEART RATE: 74 BPM | OXYGEN SATURATION: 95 % | SYSTOLIC BLOOD PRESSURE: 159 MMHG

## 2020-08-20 DIAGNOSIS — Z41.9 ENCOUNTER FOR PROCEDURE FOR PURPOSES OTHER THAN REMEDYING HEALTH STATE, UNSPECIFIED: Chronic | ICD-10-CM

## 2020-08-20 DIAGNOSIS — K46.9 UNSPECIFIED ABDOMINAL HERNIA WITHOUT OBSTRUCTION OR GANGRENE: Chronic | ICD-10-CM

## 2020-08-20 DIAGNOSIS — Z98.890 OTHER SPECIFIED POSTPROCEDURAL STATES: Chronic | ICD-10-CM

## 2020-08-20 DIAGNOSIS — T14.8 OTHER INJURY OF UNSPECIFIED BODY REGION: Chronic | ICD-10-CM

## 2020-08-20 DIAGNOSIS — T24.309A BURN OF THIRD DEGREE OF UNSPECIFIED SITE OF UNSPECIFIED LOWER LIMB, EXCEPT ANKLE AND FOOT, INITIAL ENCOUNTER: Chronic | ICD-10-CM

## 2020-08-20 PROCEDURE — 76000 FLUOROSCOPY <1 HR PHYS/QHP: CPT

## 2020-08-20 PROCEDURE — 27299 UNLISTED PX PELVIS/HIP JOINT: CPT

## 2020-08-20 PROCEDURE — 27299 UNLISTED PX PELVIS/HIP JOINT: CPT | Mod: RT

## 2020-08-20 PROCEDURE — 29880 ARTHRS KNE SRG MNISECTMY M&L: CPT | Mod: RT

## 2020-08-20 PROCEDURE — 20999 UNLISTED PX MUSCSKEL GENERAL: CPT

## 2020-08-20 RX ORDER — OXYCODONE AND ACETAMINOPHEN 5; 325 MG/1; MG/1
2 TABLET ORAL ONCE
Refills: 0 | Status: DISCONTINUED | OUTPATIENT
Start: 2020-08-20 | End: 2020-08-20

## 2020-08-20 RX ORDER — ONDANSETRON 8 MG/1
4 TABLET, FILM COATED ORAL ONCE
Refills: 0 | Status: DISCONTINUED | OUTPATIENT
Start: 2020-08-20 | End: 2020-08-20

## 2020-08-20 RX ORDER — MORPHINE SULFATE 50 MG/1
2 CAPSULE, EXTENDED RELEASE ORAL ONCE
Refills: 0 | Status: DISCONTINUED | OUTPATIENT
Start: 2020-08-20 | End: 2020-08-20

## 2020-08-20 RX ORDER — SODIUM CHLORIDE 9 MG/ML
1000 INJECTION, SOLUTION INTRAVENOUS
Refills: 0 | Status: DISCONTINUED | OUTPATIENT
Start: 2020-08-20 | End: 2020-08-20

## 2020-08-28 ENCOUNTER — APPOINTMENT (OUTPATIENT)
Dept: VASCULAR SURGERY | Facility: CLINIC | Age: 65
End: 2020-08-28
Payer: MEDICARE

## 2020-08-28 PROCEDURE — 99213 OFFICE O/P EST LOW 20 MIN: CPT

## 2020-08-28 NOTE — REVIEW OF SYSTEMS
[As noted in HPI] : as noted in HPI [Lower Ext Edema] : lower extremity edema [Limb Pain] : limb pain [Limb Swelling] : limb swelling [As Noted in HPI] : as noted in HPI [Skin Lesions] : skin lesion [Negative] : Heme/Lymph [Fever] : no fever [Leg Claudication] : no intermittent leg claudication [Chills] : no chills

## 2020-08-28 NOTE — ADDENDUM
[FreeTextEntry1] : This note was written by Bella Hewitt on 08/28/2020 acting as scribe for Nieves Pinedo M.D.\par \par I, Nieves Pinedo, have read and attest that all the information, medical decision making and discharge instructions within are true and accurate.

## 2020-08-28 NOTE — HISTORY OF PRESENT ILLNESS
[FreeTextEntry1] : 65 year old male with  recurrent left lower extremity cellulitis returns for a f/u. LLE  ulcerations healed, edema is better controlled. Patient presents today with erythema and pruritus to the B/L lower extremities, he is concerned for cellulitis, he saw his PCP who prescribed Cephalexin and would like to know if he should continue with abx. \par \par Denies: Fever, chills, nausea, SOB

## 2020-08-28 NOTE — ASSESSMENT
[FreeTextEntry1] : 65 year old male with  recurrent left lower extremity cellulitis returns for a f/u. LLE  ulcerations healed, edema is better controlled. Patient presents today with erythema and pruritus to the B/L lower extremities, he is concerned for cellulitis, he saw his PCP who prescribed Cephalexin and would like to know if he should continue with abx. Upon exam patient with swelling and erythema to the B/L lower extremities. I have reassured the patient this is secondary to his stasis dermatitis and not of infectious etiology therefore I have recommended patient stop antibiotics.

## 2020-08-28 NOTE — PHYSICAL EXAM
[2+] : left 2+ [Ankle Swelling Bilaterally] : bilaterally  [Ankle Swelling (On Exam)] : present [Ankle Swelling On The Left] : moderate [Calm] : calm [Alert] : alert [Respiratory Effort] : normal respiratory effort [Normal Rate and Rhythm] : normal rate and rhythm [Varicose Veins Of Lower Extremities] : not present [] : not present [Abdomen Tenderness] : ~T ~M No abdominal tenderness [de-identified] : WN/WD, NAD [de-identified] : B/l LE: ulcerations over anterior shin healed,   with erythema  [de-identified] : Supple [de-identified] : NCAT

## 2020-09-04 ENCOUNTER — APPOINTMENT (OUTPATIENT)
Dept: ORTHOPEDIC SURGERY | Facility: CLINIC | Age: 65
End: 2020-09-04
Payer: MEDICARE

## 2020-09-04 DIAGNOSIS — M87.9 OSTEONECROSIS, UNSPECIFIED: ICD-10-CM

## 2020-09-04 PROCEDURE — 99024 POSTOP FOLLOW-UP VISIT: CPT

## 2020-09-04 NOTE — ASSESSMENT
[FreeTextEntry1] : Assessment \par #1 status post bilateral knee scopes doing well \par Plan \par #1 Sutures removed, Steri-Strips applied, wound care structures given \par #2 NSAID medications refilled \par #3 begin physical therapy, prescription given \par #4 FU with any additional pain\par \par \par

## 2020-09-04 NOTE — HISTORY OF PRESENT ILLNESS
[de-identified] : 64 yo male s/p bilateral knees scope on 08/20/20 and doing great. He reports ambulation/mobility is great and pain is managed without pain meds. He reports no other complaints at this time.\par

## 2020-09-04 NOTE — PHYSICAL EXAM
[de-identified] : Not in acute distress, dressed appropriately, sitting on examination table \par Skin: Warm and dry, normal turgor, no rashes \par Neurological: AOx3, Cranial nerves grossly in tact \par Psych: Mood and affect appropriate \par Right Knee: Well healed anterior x2 5 mm surgical incisions closed with sutures. No swelling edema erythema redness or drainage. tender anteriorly. Netural alignement. ROM: did not asses. Stable. 5/5 Strength. DNVI. Ambulates with cane.\par Left Knee: Well healed anterior x2 5 mm surgical incisions closed with sutures. No swelling edema erythema redness or drainage. tender anteriorly. Netural alignement. ROM: did not asses. Stable. 5/5 Strength. DNVI. Ambulates with cane.\par \par \par \par  [de-identified] : none today

## 2021-03-11 ENCOUNTER — TRANSCRIPTION ENCOUNTER (OUTPATIENT)
Age: 66
End: 2021-03-11

## 2021-03-11 ENCOUNTER — INPATIENT (INPATIENT)
Facility: HOSPITAL | Age: 66
LOS: 1 days | Discharge: HOME | DRG: 274 | End: 2021-03-13
Attending: SPECIALIST | Admitting: SPECIALIST
Payer: MEDICARE

## 2021-03-11 VITALS
HEART RATE: 146 BPM | TEMPERATURE: 98 F | WEIGHT: 229.94 LBS | RESPIRATION RATE: 18 BRPM | SYSTOLIC BLOOD PRESSURE: 117 MMHG | DIASTOLIC BLOOD PRESSURE: 83 MMHG | HEIGHT: 69 IN | OXYGEN SATURATION: 97 %

## 2021-03-11 DIAGNOSIS — I48.92 UNSPECIFIED ATRIAL FLUTTER: ICD-10-CM

## 2021-03-11 DIAGNOSIS — K21.9 GASTRO-ESOPHAGEAL REFLUX DISEASE WITHOUT ESOPHAGITIS: ICD-10-CM

## 2021-03-11 DIAGNOSIS — E78.5 HYPERLIPIDEMIA, UNSPECIFIED: ICD-10-CM

## 2021-03-11 DIAGNOSIS — N40.0 BENIGN PROSTATIC HYPERPLASIA WITHOUT LOWER URINARY TRACT SYMPTOMS: ICD-10-CM

## 2021-03-11 DIAGNOSIS — K46.9 UNSPECIFIED ABDOMINAL HERNIA WITHOUT OBSTRUCTION OR GANGRENE: Chronic | ICD-10-CM

## 2021-03-11 DIAGNOSIS — T14.8 OTHER INJURY OF UNSPECIFIED BODY REGION: Chronic | ICD-10-CM

## 2021-03-11 DIAGNOSIS — Z41.9 ENCOUNTER FOR PROCEDURE FOR PURPOSES OTHER THAN REMEDYING HEALTH STATE, UNSPECIFIED: Chronic | ICD-10-CM

## 2021-03-11 DIAGNOSIS — T24.309A BURN OF THIRD DEGREE OF UNSPECIFIED SITE OF UNSPECIFIED LOWER LIMB, EXCEPT ANKLE AND FOOT, INITIAL ENCOUNTER: Chronic | ICD-10-CM

## 2021-03-11 DIAGNOSIS — F32.9 MAJOR DEPRESSIVE DISORDER, SINGLE EPISODE, UNSPECIFIED: ICD-10-CM

## 2021-03-11 DIAGNOSIS — M87.00 IDIOPATHIC ASEPTIC NECROSIS OF UNSPECIFIED BONE: ICD-10-CM

## 2021-03-11 DIAGNOSIS — Z98.890 OTHER SPECIFIED POSTPROCEDURAL STATES: Chronic | ICD-10-CM

## 2021-03-11 DIAGNOSIS — R09.89 OTHER SPECIFIED SYMPTOMS AND SIGNS INVOLVING THE CIRCULATORY AND RESPIRATORY SYSTEMS: ICD-10-CM

## 2021-03-11 DIAGNOSIS — L03.119 CELLULITIS OF UNSPECIFIED PART OF LIMB: ICD-10-CM

## 2021-03-11 DIAGNOSIS — G57.32 LESION OF LATERAL POPLITEAL NERVE, LEFT LOWER LIMB: ICD-10-CM

## 2021-03-11 DIAGNOSIS — I10 ESSENTIAL (PRIMARY) HYPERTENSION: ICD-10-CM

## 2021-03-11 LAB
ALBUMIN SERPL ELPH-MCNC: 3.5 G/DL — SIGNIFICANT CHANGE UP (ref 3.3–5)
ALBUMIN SERPL ELPH-MCNC: 4 G/DL — SIGNIFICANT CHANGE UP (ref 3.3–5)
ALP SERPL-CCNC: 80 U/L — SIGNIFICANT CHANGE UP (ref 40–120)
ALP SERPL-CCNC: 83 U/L — SIGNIFICANT CHANGE UP (ref 40–120)
ALT FLD-CCNC: 19 U/L — SIGNIFICANT CHANGE UP (ref 10–45)
ALT FLD-CCNC: SIGNIFICANT CHANGE UP (ref 10–45)
ANION GAP SERPL CALC-SCNC: 10 MMOL/L — SIGNIFICANT CHANGE UP (ref 5–17)
ANION GAP SERPL CALC-SCNC: 9 MMOL/L — SIGNIFICANT CHANGE UP (ref 5–17)
APTT BLD: 34.4 SEC — SIGNIFICANT CHANGE UP (ref 27.5–35.5)
AST SERPL-CCNC: 25 U/L — SIGNIFICANT CHANGE UP (ref 10–40)
AST SERPL-CCNC: SIGNIFICANT CHANGE UP (ref 10–40)
BASOPHILS # BLD AUTO: 0.07 K/UL — SIGNIFICANT CHANGE UP (ref 0–0.2)
BASOPHILS NFR BLD AUTO: 0.6 % — SIGNIFICANT CHANGE UP (ref 0–2)
BILIRUB SERPL-MCNC: 0.2 MG/DL — SIGNIFICANT CHANGE UP (ref 0.2–1.2)
BILIRUB SERPL-MCNC: 0.2 MG/DL — SIGNIFICANT CHANGE UP (ref 0.2–1.2)
BUN SERPL-MCNC: 14 MG/DL — SIGNIFICANT CHANGE UP (ref 7–23)
BUN SERPL-MCNC: 14 MG/DL — SIGNIFICANT CHANGE UP (ref 7–23)
CALCIUM SERPL-MCNC: 9.4 MG/DL — SIGNIFICANT CHANGE UP (ref 8.4–10.5)
CALCIUM SERPL-MCNC: 9.4 MG/DL — SIGNIFICANT CHANGE UP (ref 8.4–10.5)
CHLORIDE SERPL-SCNC: 104 MMOL/L — SIGNIFICANT CHANGE UP (ref 96–108)
CHLORIDE SERPL-SCNC: 106 MMOL/L — SIGNIFICANT CHANGE UP (ref 96–108)
CK MB CFR SERPL CALC: 4.1 NG/ML — SIGNIFICANT CHANGE UP (ref 0–6.7)
CK MB CFR SERPL CALC: 4.2 NG/ML — SIGNIFICANT CHANGE UP (ref 0–6.7)
CK SERPL-CCNC: 114 U/L — SIGNIFICANT CHANGE UP (ref 30–200)
CK SERPL-CCNC: 148 U/L — SIGNIFICANT CHANGE UP (ref 30–200)
CO2 SERPL-SCNC: 23 MMOL/L — SIGNIFICANT CHANGE UP (ref 22–31)
CO2 SERPL-SCNC: 28 MMOL/L — SIGNIFICANT CHANGE UP (ref 22–31)
CREAT SERPL-MCNC: 0.78 MG/DL — SIGNIFICANT CHANGE UP (ref 0.5–1.3)
CREAT SERPL-MCNC: 0.87 MG/DL — SIGNIFICANT CHANGE UP (ref 0.5–1.3)
D DIMER BLD IA.RAPID-MCNC: <150 NG/ML DDU — SIGNIFICANT CHANGE UP
EOSINOPHIL # BLD AUTO: 0.31 K/UL — SIGNIFICANT CHANGE UP (ref 0–0.5)
EOSINOPHIL NFR BLD AUTO: 2.8 % — SIGNIFICANT CHANGE UP (ref 0–6)
GLUCOSE SERPL-MCNC: 104 MG/DL — HIGH (ref 70–99)
GLUCOSE SERPL-MCNC: 146 MG/DL — HIGH (ref 70–99)
HCT VFR BLD CALC: 48.1 % — SIGNIFICANT CHANGE UP (ref 39–50)
HGB BLD-MCNC: 15.7 G/DL — SIGNIFICANT CHANGE UP (ref 13–17)
IMM GRANULOCYTES NFR BLD AUTO: 1 % — SIGNIFICANT CHANGE UP (ref 0–1.5)
INR BLD: 1.02 — SIGNIFICANT CHANGE UP (ref 0.88–1.16)
LACTATE SERPL-SCNC: 2.1 MMOL/L — HIGH (ref 0.5–2)
LYMPHOCYTES # BLD AUTO: 2.35 K/UL — SIGNIFICANT CHANGE UP (ref 1–3.3)
LYMPHOCYTES # BLD AUTO: 20.9 % — SIGNIFICANT CHANGE UP (ref 13–44)
MCHC RBC-ENTMCNC: 30.5 PG — SIGNIFICANT CHANGE UP (ref 27–34)
MCHC RBC-ENTMCNC: 32.6 GM/DL — SIGNIFICANT CHANGE UP (ref 32–36)
MCV RBC AUTO: 93.6 FL — SIGNIFICANT CHANGE UP (ref 80–100)
MONOCYTES # BLD AUTO: 1.29 K/UL — HIGH (ref 0–0.9)
MONOCYTES NFR BLD AUTO: 11.5 % — SIGNIFICANT CHANGE UP (ref 2–14)
NEUTROPHILS # BLD AUTO: 7.12 K/UL — SIGNIFICANT CHANGE UP (ref 1.8–7.4)
NEUTROPHILS NFR BLD AUTO: 63.2 % — SIGNIFICANT CHANGE UP (ref 43–77)
NRBC # BLD: 0 /100 WBCS — SIGNIFICANT CHANGE UP (ref 0–0)
PLATELET # BLD AUTO: 248 K/UL — SIGNIFICANT CHANGE UP (ref 150–400)
POTASSIUM SERPL-MCNC: 4.6 MMOL/L — SIGNIFICANT CHANGE UP (ref 3.5–5.3)
POTASSIUM SERPL-MCNC: SIGNIFICANT CHANGE UP (ref 3.5–5.3)
POTASSIUM SERPL-SCNC: 4.6 MMOL/L — SIGNIFICANT CHANGE UP (ref 3.5–5.3)
POTASSIUM SERPL-SCNC: SIGNIFICANT CHANGE UP (ref 3.5–5.3)
PROT SERPL-MCNC: 6.3 G/DL — SIGNIFICANT CHANGE UP (ref 6–8.3)
PROT SERPL-MCNC: 6.9 G/DL — SIGNIFICANT CHANGE UP (ref 6–8.3)
PROTHROM AB SERPL-ACNC: 12.2 SEC — SIGNIFICANT CHANGE UP (ref 10.6–13.6)
RBC # BLD: 5.14 M/UL — SIGNIFICANT CHANGE UP (ref 4.2–5.8)
RBC # FLD: 14.8 % — HIGH (ref 10.3–14.5)
SODIUM SERPL-SCNC: 139 MMOL/L — SIGNIFICANT CHANGE UP (ref 135–145)
SODIUM SERPL-SCNC: 141 MMOL/L — SIGNIFICANT CHANGE UP (ref 135–145)
TROPONIN T SERPL-MCNC: 0.01 NG/ML — SIGNIFICANT CHANGE UP (ref 0–0.01)
TROPONIN T SERPL-MCNC: <0.01 NG/ML — SIGNIFICANT CHANGE UP (ref 0–0.01)
WBC # BLD: 11.25 K/UL — HIGH (ref 3.8–10.5)
WBC # FLD AUTO: 11.25 K/UL — HIGH (ref 3.8–10.5)

## 2021-03-11 PROCEDURE — 99285 EMERGENCY DEPT VISIT HI MDM: CPT

## 2021-03-11 PROCEDURE — 71045 X-RAY EXAM CHEST 1 VIEW: CPT | Mod: 26

## 2021-03-11 PROCEDURE — 93010 ELECTROCARDIOGRAM REPORT: CPT

## 2021-03-11 PROCEDURE — 99223 1ST HOSP IP/OBS HIGH 75: CPT

## 2021-03-11 RX ORDER — APIXABAN 2.5 MG/1
5 TABLET, FILM COATED ORAL EVERY 12 HOURS
Refills: 0 | Status: DISCONTINUED | OUTPATIENT
Start: 2021-03-11 | End: 2021-03-12

## 2021-03-11 RX ORDER — HYDROCHLOROTHIAZIDE 25 MG
25 TABLET ORAL DAILY
Refills: 0 | Status: DISCONTINUED | OUTPATIENT
Start: 2021-03-12 | End: 2021-03-13

## 2021-03-11 RX ORDER — DILTIAZEM HCL 120 MG
10 CAPSULE, EXT RELEASE 24 HR ORAL ONCE
Refills: 0 | Status: COMPLETED | OUTPATIENT
Start: 2021-03-11 | End: 2021-03-11

## 2021-03-11 RX ORDER — DIVALPROEX SODIUM 500 MG/1
500 TABLET, DELAYED RELEASE ORAL THREE TIMES A DAY
Refills: 0 | Status: DISCONTINUED | OUTPATIENT
Start: 2021-03-11 | End: 2021-03-13

## 2021-03-11 RX ORDER — LISINOPRIL 2.5 MG/1
10 TABLET ORAL DAILY
Refills: 0 | Status: DISCONTINUED | OUTPATIENT
Start: 2021-03-12 | End: 2021-03-13

## 2021-03-11 RX ORDER — METOPROLOL TARTRATE 50 MG
5 TABLET ORAL ONCE
Refills: 0 | Status: COMPLETED | OUTPATIENT
Start: 2021-03-11 | End: 2021-03-11

## 2021-03-11 RX ORDER — FAMOTIDINE 10 MG/ML
40 INJECTION INTRAVENOUS DAILY
Refills: 0 | Status: DISCONTINUED | OUTPATIENT
Start: 2021-03-11 | End: 2021-03-13

## 2021-03-11 RX ORDER — LISINOPRIL 2.5 MG/1
1 TABLET ORAL
Qty: 0 | Refills: 0 | DISCHARGE

## 2021-03-11 RX ORDER — TRAZODONE HCL 50 MG
0 TABLET ORAL
Qty: 0 | Refills: 0 | DISCHARGE

## 2021-03-11 RX ORDER — GABAPENTIN 400 MG/1
400 CAPSULE ORAL THREE TIMES A DAY
Refills: 0 | Status: DISCONTINUED | OUTPATIENT
Start: 2021-03-11 | End: 2021-03-13

## 2021-03-11 RX ORDER — DOXAZOSIN MESYLATE 4 MG
2 TABLET ORAL AT BEDTIME
Refills: 0 | Status: DISCONTINUED | OUTPATIENT
Start: 2021-03-11 | End: 2021-03-13

## 2021-03-11 RX ORDER — SIMVASTATIN 20 MG/1
1 TABLET, FILM COATED ORAL
Qty: 0 | Refills: 0 | DISCHARGE

## 2021-03-11 RX ORDER — SODIUM CHLORIDE 9 MG/ML
500 INJECTION INTRAMUSCULAR; INTRAVENOUS; SUBCUTANEOUS
Refills: 0 | Status: DISCONTINUED | OUTPATIENT
Start: 2021-03-11 | End: 2021-03-12

## 2021-03-11 RX ORDER — METOPROLOL TARTRATE 50 MG
25 TABLET ORAL ONCE
Refills: 0 | Status: COMPLETED | OUTPATIENT
Start: 2021-03-11 | End: 2021-03-11

## 2021-03-11 RX ORDER — METOPROLOL TARTRATE 50 MG
25 TABLET ORAL EVERY 12 HOURS
Refills: 0 | Status: DISCONTINUED | OUTPATIENT
Start: 2021-03-11 | End: 2021-03-13

## 2021-03-11 RX ORDER — OMEPRAZOLE 10 MG/1
1 CAPSULE, DELAYED RELEASE ORAL
Qty: 0 | Refills: 0 | DISCHARGE

## 2021-03-11 RX ORDER — VANCOMYCIN HCL 1 G
1500 VIAL (EA) INTRAVENOUS ONCE
Refills: 0 | Status: COMPLETED | OUTPATIENT
Start: 2021-03-11 | End: 2021-03-11

## 2021-03-11 RX ORDER — RANITIDINE HYDROCHLORIDE 150 MG/1
1 TABLET, FILM COATED ORAL
Qty: 0 | Refills: 0 | DISCHARGE

## 2021-03-11 RX ORDER — SIMVASTATIN 20 MG/1
20 TABLET, FILM COATED ORAL AT BEDTIME
Refills: 0 | Status: DISCONTINUED | OUTPATIENT
Start: 2021-03-12 | End: 2021-03-13

## 2021-03-11 RX ORDER — SODIUM CHLORIDE 9 MG/ML
250 INJECTION INTRAMUSCULAR; INTRAVENOUS; SUBCUTANEOUS ONCE
Refills: 0 | Status: COMPLETED | OUTPATIENT
Start: 2021-03-11 | End: 2021-03-11

## 2021-03-11 RX ORDER — VANCOMYCIN HCL 1 G
VIAL (EA) INTRAVENOUS
Refills: 0 | Status: DISCONTINUED | OUTPATIENT
Start: 2021-03-11 | End: 2021-03-11

## 2021-03-11 RX ORDER — ALLOPURINOL 300 MG
1 TABLET ORAL
Qty: 0 | Refills: 0 | DISCHARGE

## 2021-03-11 RX ADMIN — APIXABAN 5 MILLIGRAM(S): 2.5 TABLET, FILM COATED ORAL at 18:26

## 2021-03-11 RX ADMIN — SODIUM CHLORIDE 60 MILLILITER(S): 9 INJECTION INTRAMUSCULAR; INTRAVENOUS; SUBCUTANEOUS at 19:50

## 2021-03-11 RX ADMIN — Medication 1500 MILLIGRAM(S): at 14:30

## 2021-03-11 RX ADMIN — SODIUM CHLORIDE 500 MILLILITER(S): 9 INJECTION INTRAMUSCULAR; INTRAVENOUS; SUBCUTANEOUS at 14:34

## 2021-03-11 RX ADMIN — GABAPENTIN 400 MILLIGRAM(S): 400 CAPSULE ORAL at 23:36

## 2021-03-11 RX ADMIN — FAMOTIDINE 40 MILLIGRAM(S): 10 INJECTION INTRAVENOUS at 23:36

## 2021-03-11 RX ADMIN — Medication 300 MILLIGRAM(S): at 12:44

## 2021-03-11 RX ADMIN — Medication 5 MILLIGRAM(S): at 12:23

## 2021-03-11 RX ADMIN — Medication 2 MILLIGRAM(S): at 23:36

## 2021-03-11 RX ADMIN — Medication 25 MILLIGRAM(S): at 16:06

## 2021-03-11 RX ADMIN — Medication 5 MILLIGRAM(S): at 13:02

## 2021-03-11 NOTE — H&P ADULT - PROBLEM SELECTOR PLAN 4
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 Foster)  -Tylenol PRN for pain
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
## 2021-03-11 NOTE — ED ADULT NURSE NOTE - OBJECTIVE STATEMENT
Pt presents to ED w/ c/o CP/SOB and palpitations. Pt noted to be tachycardic in the 160's upon triage. Pt placed on cardiac monitor, in no acute distress. Cellulitis noted to L lower extremity. Hx of avascular disease.
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
## 2021-03-11 NOTE — H&P ADULT - PROBLEM SELECTOR PLAN 1
-Pt woken from sleep with sudden chest pressure w/ no radiation a/w dizziness, diaphoresis and SOB  -Noted to be in new onset atrial flutter in ER  -ECG: Aflutter @ 154 w/ no ischemic changes. Trop negative x1, F/u 6pm trop  -S/p Diltiazem 10 IV x1, Lopressor 25mg PO x1 and Metoprolol tartrate 5mg IV x2 in ER  -Currently HR 70s-110s (occasionally jumping to 140s w/ movement)  -EP consulted: NPO after midnight for SARAN/DCCV on 3/12  -Started on Lopressor 25mg BID and Eliquis 5mg BID per EP. Can uptitrate BB PRN  -AM TFTs -Pt woken from sleep with sudden chest pressure w/ no radiation a/w dizziness, diaphoresis and SOB  -Noted to be in new onset atrial flutter in ER  -ECG: Aflutter @ 154 w/ no ischemic changes. Trop negative x1, F/u 6pm trop  -S/p Diltiazem 10 IV x1, Lopressor 25mg PO x1 and Metoprolol tartrate 5mg IV x2 in ER  -Spontaneously converted to NSR at 7pm (ECG in chart)  -EP initially consulted: NPO after midnight for possible need for SARAN/DCCV on 3/12 if patient goes back in aflutter  -CONT: Lopressor 25mg BID and Eliquis 5mg BID per EP.   -AM TFTs  -TTE ordered

## 2021-03-11 NOTE — H&P ADULT - PROBLEM SELECTOR PLAN 3
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
## 2021-03-11 NOTE — ED ADULT NURSE NOTE - NS ED NURSE TRANSPORT WITH
Cardiac Monitor/Defib/ACLS/Rescue Kit/O2/BVM Cardiac Monitor/Defib/ACLS/Rescue Kit/O2/BVM/ACLS Rescue Kit

## 2021-03-11 NOTE — H&P ADULT - EXTREMITIES COMMENTS
2-3+ b/l pitting edema (L>R)  Medial aspect of L ankle w/ redness  No skin breakdown, warmth, discharge

## 2021-03-11 NOTE — ED ADULT NURSE NOTE - BREATHING
To Dr. Sweet- please advise. OK for letter that patient was presumed to have COVID-19 12/20/2020?    spontaneous

## 2021-03-11 NOTE — ED PROVIDER NOTE - TEMPLATE, MLM
Pt/parent declined 48hr nurse callback upon discharge.  Pt/parent verbalized understanding re: discharge instructions, medication, and f/u information.  Urgent Care phone number provided to patient if questions arise.    
General

## 2021-03-11 NOTE — PATIENT PROFILE ADULT - VISION (WITH CORRECTIVE LENSES IF THE PATIENT USUALLY WEARS THEM):
Patient left his eye glasses at home./Partially impaired: cannot see medication labels or newsprint, but can see obstacles in path, and the surrounding layout; can count fingers at arm's length

## 2021-03-11 NOTE — ED PROVIDER NOTE - CARE PLAN
Principal Discharge DX:	Atrial flutter, unspecified type  Secondary Diagnosis:	Chest pain, unspecified type  Secondary Diagnosis:	Cellulitis, unspecified cellulitis site

## 2021-03-11 NOTE — H&P ADULT - PROBLEM SELECTOR PLAN 5
-SBPs softer @ 100s-110s  -S/p Diltiazem 10 IV x1, Lopressor 25mg PO x1 and Metoprolol tartrate 5mg IV x2 in ER  -CONT: Lopressor 25mg BID, Lisinopril 10mg QD, HCTZ 25mg QD -SBPs softer @ 100s-110s  -S/p Diltiazem 10 IV x1, Lopressor 25mg PO x1 and Metoprolol tartrate 5mg IV x2 in ER  -When patient converted, SBPs ranging mid 80s-mid 90s b/l. Dr. Car aware and will start patient on NS @ 60cc/hr x 12 hrs  -CONT: Lopressor 25mg BID, Lisinopril 10mg QD, HCTZ 25mg QD starting 3/12
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
## 2021-03-11 NOTE — ED PROVIDER NOTE - PHYSICAL EXAMINATION
VITAL SIGNS: I have reviewed nursing notes and confirm.  CONSTITUTIONAL: Patient is mildly obese; in no acute distress.   SKIN:  warm and dry, no acute rash.   HEAD:  normocephalic, atraumatic.  EYES: EOM intact; conjunctiva and sclera clear.  ENT: No nasal discharge; airway clear.   NECK: Supple; non tender.  CARD: S1, S2 normal; no murmurs, gallops, or rubs. Irregular rate and tachycardic rhythm.   RESP:  Clear to auscultation b/l, no wheezes, rales or rhonchi.  ABD: Normal bowel sounds; soft; non-distended; non-tender; no guarding/ rebound.  EXT: Left lower extremity  with positive edema; positive erythema to distal left lower extremity, distal pulses are intact bilaterally.  NEURO: Alert, oriented, grossly unremarkable. Motor and sensation functions are  intact bilateral.  PSYCH: Cooperative, mood and affect appropriate. VITAL SIGNS: I have reviewed nursing notes and confirm.  CONSTITUTIONAL: Patient is WNWD m, obese; in no acute distress.   SKIN:  warm and dry, no acute rash.   HEAD:  normocephalic, atraumatic.  EYES: EOM intact; conjunctiva and sclera clear.  ENT: No nasal discharge; airway clear.   NECK: Supple; non tender.  CARD: Tachycardic and irregular rhythm.   RESP:  Clear to auscultation b/l, no wheezes, rales or rhonchi.  ABD: Normal bowel sounds; soft; non-distended; non-tender; no guarding/ rebound.  EXT: Left lower extremity with positive edema, erythema and slight warmth to distal leg, distal pulses are intact bilaterally.  NEURO: Alert, oriented, grossly unremarkable. Motor and sensation functions are intact bilateral.  PSYCH: Cooperative, mood and affect appropriate.

## 2021-03-11 NOTE — ED ADULT TRIAGE NOTE - CHIEF COMPLAINT QUOTE
Pt presents reporting chest pain, tightness, shortness of breath, weakness. Hx of avascular necrosis stage 3. Tachycardic
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
## 2021-03-11 NOTE — ED ADULT NURSE NOTE - NSIMPLEMENTINTERV_GEN_ALL_ED
Implemented All Fall Risk Interventions:  [place] to call system. Call bell, personal items and telephone within reach. Instruct patient to call for assistance. Room bathroom lighting operational. Non-slip footwear when patient is off stretcher. Physically safe environment: no spills, clutter or unnecessary equipment. Stretcher in lowest position, wheels locked, appropriate side rails in place. Provide visual cue, wrist band, yellow gown, etc. Monitor gait and stability. Monitor for mental status changes and reorient to person, place, and time. Review medications for side effects contributing to fall risk. Reinforce activity limits and safety measures with patient and family.
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
## 2021-03-11 NOTE — ED PROVIDER NOTE - CLINICAL SUMMARY MEDICAL DECISION MAKING FREE TEXT BOX
Impression: CP, SOB and dizziness this morning. EKG notable for a-flutter with 2:1 block. Lopressor was given with slight improvement of rate exhibited.Pt is  hemodynamically stable.     Plan: Will administer additional Lopressor IV and p/o. Will administer vancomycin for cellulitis. Will consult cardiology for teleetry admission. Impression: CP, SOB and dizziness since this morning. HDS. EKG notable for a-flutter with 2:1 block. Pt given lopressor 5mg iv x 2 with minimal improvement of rate. BP noted to drop afterwards and improved after gentle ivf bolus. Pt seen by EP and recommending metoprolol 25mg bid, npo after midnight, in preparation for SARAN/ cardioversion tomorrow. Pt given vancomycin for lle cellulitis. Case d/w Dr. Car; will admit to [facility] for further work up and management.

## 2021-03-11 NOTE — ED ADULT NURSE REASSESSMENT NOTE - NS ED NURSE REASSESS COMMENT FT1
Patient care received from previous RN at change of shift.  Patient aoX 3, no chest pain, SOB or leg pain at this time, sitting up in bed using laptop.  Sinus bradycardia HR 59 on cardiac monitor, BP improved, afebrile, other vitals stable.  NSS 500ml ongoing 60ml /hr., tolerating well.  For admit 5 uris [id number].  Endorsement report and care received by RN Isaac 5 uris; transport pending.

## 2021-03-11 NOTE — CONSULT NOTE ADULT - PROBLEM SELECTOR RECOMMENDATION 9
- Please obtain SARAN tomorrow morning.   - Keep NPO after midnight for RF ablation of his AFL substrate. This plan was discussed in detail and the patient is agreeable.   - Lopressor 25mg PO BID for rate control. Up-titrate as BP allows.   - Start Eliquis 5mg BID.

## 2021-03-12 ENCOUNTER — TRANSCRIPTION ENCOUNTER (OUTPATIENT)
Age: 66
End: 2021-03-12

## 2021-03-12 LAB
A1C WITH ESTIMATED AVERAGE GLUCOSE RESULT: 6 % — HIGH (ref 4–5.6)
ALBUMIN SERPL ELPH-MCNC: 3.5 G/DL — SIGNIFICANT CHANGE UP (ref 3.3–5)
ALP SERPL-CCNC: 69 U/L — SIGNIFICANT CHANGE UP (ref 40–120)
ALT FLD-CCNC: 19 U/L — SIGNIFICANT CHANGE UP (ref 10–45)
ANION GAP SERPL CALC-SCNC: 11 MMOL/L — SIGNIFICANT CHANGE UP (ref 5–17)
AST SERPL-CCNC: 17 U/L — SIGNIFICANT CHANGE UP (ref 10–40)
BILIRUB DIRECT SERPL-MCNC: 0.2 MG/DL — SIGNIFICANT CHANGE UP (ref 0–0.2)
BILIRUB INDIRECT FLD-MCNC: 0.2 MG/DL — SIGNIFICANT CHANGE UP (ref 0.2–1)
BILIRUB SERPL-MCNC: 0.4 MG/DL — SIGNIFICANT CHANGE UP (ref 0.2–1.2)
BUN SERPL-MCNC: 15 MG/DL — SIGNIFICANT CHANGE UP (ref 7–23)
CALCIUM SERPL-MCNC: 9.6 MG/DL — SIGNIFICANT CHANGE UP (ref 8.4–10.5)
CHLORIDE SERPL-SCNC: 103 MMOL/L — SIGNIFICANT CHANGE UP (ref 96–108)
CHOLEST SERPL-MCNC: 149 MG/DL — SIGNIFICANT CHANGE UP
CO2 SERPL-SCNC: 26 MMOL/L — SIGNIFICANT CHANGE UP (ref 22–31)
CREAT SERPL-MCNC: 0.99 MG/DL — SIGNIFICANT CHANGE UP (ref 0.5–1.3)
ESTIMATED AVERAGE GLUCOSE: 126 MG/DL — HIGH (ref 68–114)
GLUCOSE SERPL-MCNC: 90 MG/DL — SIGNIFICANT CHANGE UP (ref 70–99)
HCT VFR BLD CALC: 43.9 % — SIGNIFICANT CHANGE UP (ref 39–50)
HDLC SERPL-MCNC: 51 MG/DL — SIGNIFICANT CHANGE UP
HGB BLD-MCNC: 14.1 G/DL — SIGNIFICANT CHANGE UP (ref 13–17)
LACTATE SERPL-SCNC: 1.6 MMOL/L — SIGNIFICANT CHANGE UP (ref 0.5–2)
LIPID PNL WITH DIRECT LDL SERPL: 81 MG/DL — SIGNIFICANT CHANGE UP
MAGNESIUM SERPL-MCNC: 1.9 MG/DL — SIGNIFICANT CHANGE UP (ref 1.6–2.6)
MCHC RBC-ENTMCNC: 30.7 PG — SIGNIFICANT CHANGE UP (ref 27–34)
MCHC RBC-ENTMCNC: 32.1 GM/DL — SIGNIFICANT CHANGE UP (ref 32–36)
MCV RBC AUTO: 95.4 FL — SIGNIFICANT CHANGE UP (ref 80–100)
NON HDL CHOLESTEROL: 98 MG/DL — SIGNIFICANT CHANGE UP
NRBC # BLD: 0 /100 WBCS — SIGNIFICANT CHANGE UP (ref 0–0)
PLATELET # BLD AUTO: 218 K/UL — SIGNIFICANT CHANGE UP (ref 150–400)
POTASSIUM SERPL-MCNC: 4.2 MMOL/L — SIGNIFICANT CHANGE UP (ref 3.5–5.3)
POTASSIUM SERPL-SCNC: 4.2 MMOL/L — SIGNIFICANT CHANGE UP (ref 3.5–5.3)
PROT SERPL-MCNC: 6.3 G/DL — SIGNIFICANT CHANGE UP (ref 6–8.3)
RBC # BLD: 4.6 M/UL — SIGNIFICANT CHANGE UP (ref 4.2–5.8)
RBC # FLD: 15.2 % — HIGH (ref 10.3–14.5)
SODIUM SERPL-SCNC: 140 MMOL/L — SIGNIFICANT CHANGE UP (ref 135–145)
T3 SERPL-MCNC: 112 NG/DL — SIGNIFICANT CHANGE UP (ref 80–200)
T4 AB SER-ACNC: 5.75 UG/DL — SIGNIFICANT CHANGE UP (ref 3.17–11.72)
TRIGL SERPL-MCNC: 87 MG/DL — SIGNIFICANT CHANGE UP
TSH SERPL-MCNC: 1.26 UIU/ML — SIGNIFICANT CHANGE UP (ref 0.35–4.94)
WBC # BLD: 10.95 K/UL — HIGH (ref 3.8–10.5)
WBC # FLD AUTO: 10.95 K/UL — HIGH (ref 3.8–10.5)

## 2021-03-12 PROCEDURE — 93306 TTE W/DOPPLER COMPLETE: CPT | Mod: 26

## 2021-03-12 PROCEDURE — 93970 EXTREMITY STUDY: CPT | Mod: 26

## 2021-03-12 PROCEDURE — 93653 COMPRE EP EVAL TX SVT: CPT

## 2021-03-12 PROCEDURE — 93609 INTRA-VNTR MAPG TCHYCAR SITE: CPT | Mod: 26

## 2021-03-12 RX ORDER — APIXABAN 2.5 MG/1
5 TABLET, FILM COATED ORAL EVERY 12 HOURS
Refills: 0 | Status: DISCONTINUED | OUTPATIENT
Start: 2021-03-12 | End: 2021-03-13

## 2021-03-12 RX ADMIN — Medication 25 MILLIGRAM(S): at 11:09

## 2021-03-12 RX ADMIN — Medication 2 MILLIGRAM(S): at 21:35

## 2021-03-12 RX ADMIN — FAMOTIDINE 40 MILLIGRAM(S): 10 INJECTION INTRAVENOUS at 11:09

## 2021-03-12 RX ADMIN — GABAPENTIN 400 MILLIGRAM(S): 400 CAPSULE ORAL at 05:52

## 2021-03-12 RX ADMIN — GABAPENTIN 400 MILLIGRAM(S): 400 CAPSULE ORAL at 21:35

## 2021-03-12 RX ADMIN — Medication 25 MILLIGRAM(S): at 21:37

## 2021-03-12 RX ADMIN — APIXABAN 5 MILLIGRAM(S): 2.5 TABLET, FILM COATED ORAL at 05:52

## 2021-03-12 RX ADMIN — DIVALPROEX SODIUM 500 MILLIGRAM(S): 500 TABLET, DELAYED RELEASE ORAL at 21:42

## 2021-03-12 RX ADMIN — APIXABAN 5 MILLIGRAM(S): 2.5 TABLET, FILM COATED ORAL at 21:35

## 2021-03-12 RX ADMIN — SIMVASTATIN 20 MILLIGRAM(S): 20 TABLET, FILM COATED ORAL at 21:37

## 2021-03-12 RX ADMIN — DIVALPROEX SODIUM 500 MILLIGRAM(S): 500 TABLET, DELAYED RELEASE ORAL at 18:09

## 2021-03-12 RX ADMIN — DIVALPROEX SODIUM 500 MILLIGRAM(S): 500 TABLET, DELAYED RELEASE ORAL at 11:09

## 2021-03-12 NOTE — PHYSICAL THERAPY INITIAL EVALUATION ADULT - ACTIVE RANGE OF MOTION EXAMINATION, REHAB EVAL
L DF to -15 from neutral, L knee flexion 0-100/bilateral upper extremity Active ROM was WFL (within functional limits)/bilateral  lower extremity Active ROM was WFL (within functional limits)

## 2021-03-12 NOTE — DISCHARGE NOTE PROVIDER - PROVIDER TOKENS
PROVIDER:[TOKEN:[[id number] PROVIDER:[TOKEN:[[id number]],ESTABLISHEDPATIENT:[T]],PROVIDER:[TOKEN:[[id number]],ESTABLISHEDPATIENT:[T]],PROVIDER:[TOKEN:[[id number]],SCHEDULEDAPPT:[04/15/2021],SCHEDULEDAPPTTIME:[11:20 AM],ESTABLISHEDPATIENT:[T]]

## 2021-03-12 NOTE — DISCHARGE NOTE PROVIDER - NSDCCPCAREPLAN_GEN_ALL_CORE_FT
PRINCIPAL DISCHARGE DIAGNOSIS  Diagnosis: Atrial flutter, unspecified type  Assessment and Plan of Treatment: You were found to have an abnormal heart rhythm called atrial flutter.  The chest pain you experienced was likely due to being in this abnormal heart rhythm. When your heart in in this rhythm it beats very quickly and in an irregular pattern. There is an increased risk of stroke with this rhythm for which you were started on the blood thinning medication Eliquis 5 mg twice daily. For heart rate control please continue metoprolol tartrate 25 mg twice daily. You underwent a ablation procedure to keep your heart in a normal rhythm. Please follow up with your cardiologist Dr Car in 1-2 weeks.         SECONDARY DISCHARGE DIAGNOSES  Diagnosis: Chronic stasis dermatitis  Assessment and Plan of Treatment: You were evaluated by the the vascular physician Dr Schumacher. They did not suspect acute cellulitis and recommended elevation and ACE wrap compression.     PRINCIPAL DISCHARGE DIAGNOSIS  Diagnosis: Atrial flutter, unspecified type  Assessment and Plan of Treatment: You were found to have an abnormal heart rhythm called atrial flutter.  The chest pain you experienced was likely due to being in this abnormal heart rhythm. When your heart in in this rhythm it beats very quickly and in an irregular pattern. There is an increased risk of stroke with this rhythm for which you were started on the blood thinning medication Eliquis 5 mg twice daily. For heart rate control please continue metoprolol tartrate 25 mg twice daily. You underwent a ablation procedure to keep your heart in a normal rhythm. Please follow up with your cardiologist Dr Car in 1-2 weeks, Dr Car's office will call you with the appoitnment date and time. Please follow up with Dr Rajta Monsalve (Electrophysiologist) on 4/15/21 @ 11:20 am. You had one month of Eliquis and Metoprolol tartrate sent to the [facility] pharamacy in the [facility], please have Dr Car send your refills to your preferred pharamcy.         SECONDARY DISCHARGE DIAGNOSES  Diagnosis: Chronic stasis dermatitis  Assessment and Plan of Treatment: You were evaluated by the the vascular physician Dr Schumacher. They did not suspect acute cellulitis and recommended elevation and ACE wrap compression. You were sent prophylactic Cephalexin (Keflex) 500 mg 4 x day for total of 7 days. Please follow up with Dr Schumacher in 1-2 weeks please call the office to schedule an appointment.
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
## 2021-03-12 NOTE — PROGRESS NOTE ADULT - PROBLEM SELECTOR PLAN 5
-S/p Diltiazem 10 IV x1, Lopressor 25mg PO x1 and Metoprolol tartrate 5mg IV x2 in ER  -When patient converted, SBPs ranging mid 80s-mid 90s b/l. Dr. Car aware and will s/p  NS @ 60cc/hr x 12 hrs  -SBP's stable 120-140's   -CONT: Lopressor 25mg BID, Lisinopril 10mg QD, HCTZ 25mg QD starting 3/12

## 2021-03-12 NOTE — CHART NOTE - NSCHARTNOTEFT_GEN_A_CORE
CELSO RAMOS  [id number]    PROCEDURE:  AFL ablation          INDICATION:  AFL        ELECTROPHYSIOLOGIST(S):  MD Chi Monsalve MD      FINDINGS:  - Successful ablation of CTI with bidirectional block      COMPLICATIONS:  None      RECOMMENDATIONS:  - Bedrest for 4 hours  - Resume Eliquis
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
## 2021-03-12 NOTE — PHYSICAL THERAPY INITIAL EVALUATION ADULT - ADDITIONAL COMMENTS
Pt lives in apartment with ramp, with roommate. Pt reports using [employer] crutches in/outdoors. Uses ACE wrap on L ankle for h/o foot drop. Amb tolerance 1-15 blocks depending on day. Has seat in shower, denies falls, indpt with ADLs/iADLs.

## 2021-03-12 NOTE — PROGRESS NOTE ADULT - PROBLEM SELECTOR PLAN 9
-CONT: Doxazosin 2mg QD    Case d/w Dr. Car  DVT PPX: Eliquis 5mg BID  Dispo: pending ablation with EP

## 2021-03-12 NOTE — PROGRESS NOTE ADULT - SUBJECTIVE AND OBJECTIVE BOX
I sent in presc for pravastatin.  Have her start and recheck CMP, LP after being on it for 6 weeks.   Interventional Cardiology PA Adult Progress Note    Subjective Assessment: pt seen and examined at bedside.   	  MEDICATIONS:  doxazosin 2 milliGRAM(s) Oral at bedtime  hydrochlorothiazide 25 milliGRAM(s) Oral daily  lisinopril 10 milliGRAM(s) Oral daily  metoprolol tartrate 25 milliGRAM(s) Oral every 12 hours        diVALproex  milliGRAM(s) Oral three times a day  gabapentin 400 milliGRAM(s) Oral three times a day    famotidine    Tablet 40 milliGRAM(s) Oral daily    simvastatin 20 milliGRAM(s) Oral at bedtime    apixaban 5 milliGRAM(s) Oral every 12 hours  sodium chloride 0.9%. 500 milliLiter(s) IV Continuous <Continuous>      	    [PHYSICAL EXAM:  TELEMETRY:  T(C): 36.3 (03-12-21 @ 13:24), Max: 36.8 (03-11-21 @ 19:15)  HR: 67 (03-12-21 @ 11:50) (59 - 148)  BP: 124/69 (03-12-21 @ 11:50) (82/57 - 149/87)  RR: 18 (03-12-21 @ 11:45) (18 - 20)  SpO2: 95% (03-12-21 @ 11:45) (93% - 96%)  Wt(kg): --  I&O's Summary    12 Mar 2021 07:01  -  12 Mar 2021 13:47  --------------------------------------------------------  IN: 0 mL / OUT: 0 mL / NET: 0 mL        Davis:  Central/PICC/Mid Line:                                         Appearance: Normal	  HEENT:   Normal oral mucosa, PERRL, EOMI	  Neck: Supple, + JVD/ - JVD; Carotid Bruit   Cardiovascular: Normal S1 S2, No JVD, No murmurs,   Respiratory: Lungs clear to auscultation/Decreased Breath Sounds/No Rales, Rhonchi, Wheezing	  Gastrointestinal:  Soft, Non-tender, + BS	  Skin: No rashes, No ecchymoses, No cyanosis  Extremities: Normal range of motion, No clubbing, cyanosis or edema  Vascular: Peripheral pulses palpable 2+ bilaterally  Neurologic: Non-focal  Psychiatry: A & O x 3, Mood & affect appropriate      	    ECG:  	  RADIOLOGY:   DIAGNOSTIC TESTING:  [ ] Echocardiogram:  [ ]  Catheterization:  [ ] Stress Test:    [ ] SARAN  OTHER: 	    LABS:	 	  CARDIAC MARKERS:                                  14.1   10.95 )-----------( 218      ( 12 Mar 2021 06:32 )             43.9     03-12    140  |  103  |  15  ----------------------------<  90  4.2   |  26  |  0.99    Ca    9.6      12 Mar 2021 06:32  Mg     1.9     03-12    TPro  6.3  /  Alb  3.5  /  TBili  0.4  /  DBili  0.2  /  AST  17  /  ALT  19  /  AlkPhos  69  03-12    proBNP:   Lipid Profile:   HgA1c:   TSH: Thyroid Stimulating Hormone, Serum: 1.258 uIU/mL (03-12 @ 06:32)    PT/INR - ( 11 Mar 2021 12:12 )   PT: 12.2 sec;   INR: 1.02          PTT - ( 11 Mar 2021 12:12 )  PTT:34.4 sec    ASSESSMENT/PLAN: 	        DVT ppx:  Dispo:     Interventional Cardiology PA Adult Progress Note    Subjective Assessment: pt seen and examined at bedside. No active complaints at this time. Denies CP, SOB, N/V/D.     ROS negative except for subjective and HPI.   	  MEDICATIONS:  doxazosin 2 milliGRAM(s) Oral at bedtime  hydrochlorothiazide 25 milliGRAM(s) Oral daily  lisinopril 10 milliGRAM(s) Oral daily  metoprolol tartrate 25 milliGRAM(s) Oral every 12 hours        diVALproex  milliGRAM(s) Oral three times a day  gabapentin 400 milliGRAM(s) Oral three times a day    famotidine    Tablet 40 milliGRAM(s) Oral daily    simvastatin 20 milliGRAM(s) Oral at bedtime    apixaban 5 milliGRAM(s) Oral every 12 hours  sodium chloride 0.9%. 500 milliLiter(s) IV Continuous <Continuous>      	    [PHYSICAL EXAM:  TELEMETRY:  T(C): 36.3 (03-12-21 @ 13:24), Max: 36.8 (03-11-21 @ 19:15)  HR: 67 (03-12-21 @ 11:50) (59 - 148)  BP: 124/69 (03-12-21 @ 11:50) (82/57 - 149/87)  RR: 18 (03-12-21 @ 11:45) (18 - 20)  SpO2: 95% (03-12-21 @ 11:45) (93% - 96%)  Wt(kg): --  I&O's Summary    12 Mar 2021 07:01  -  12 Mar 2021 13:47  --------------------------------------------------------  IN: 0 mL / OUT: 0 mL / NET: 0 mL        Davis:  Central/PICC/Mid Line:                                         Appearance: Normal	  HEENT:   Normal oral mucosa, PERRL, EOMI	  Neck: Supple,  - JVD  Cardiovascular: Normal S1 S2, No JVD, No murmurs  Respiratory: Lungs clear to auscultation, No Rales, Rhonchi, Wheezing	  Gastrointestinal:  Soft, Non-tender, + BS	  Skin: chronic venous stasis discoloration of legs with erythema but no increased warmth, skin breakdown, or discharge.   Extremities: Normal range of motion, No clubbing, cyanosis. +3 pitting edema B/L with L>R side.   Vascular: unable to palpate PT/DP 2/2 edema   Neurologic: Non-focal  Psychiatry: A & O x 3, Mood & affect appropriate      	    ECG:  	  RADIOLOGY:   DIAGNOSTIC TESTING:  [ ] Echocardiogram:  [ ]  Catheterization:  [ ] Stress Test:    [ ] SARAN  OTHER: 	    LABS:	 	  CARDIAC MARKERS:                                  14.1   10.95 )-----------( 218      ( 12 Mar 2021 06:32 )             43.9     03-12    140  |  103  |  15  ----------------------------<  90  4.2   |  26  |  0.99    Ca    9.6      12 Mar 2021 06:32  Mg     1.9     03-12    TPro  6.3  /  Alb  3.5  /  TBili  0.4  /  DBili  0.2  /  AST  17  /  ALT  19  /  AlkPhos  69  03-12    proBNP:   Lipid Profile:   HgA1c:   TSH: Thyroid Stimulating Hormone, Serum: 1.258 uIU/mL (03-12 @ 06:32)    PT/INR - ( 11 Mar 2021 12:12 )   PT: 12.2 sec;   INR: 1.02          PTT - ( 11 Mar 2021 12:12 )  PTT:34.4 sec    ASSESSMENT/PLAN: 	        DVT ppx:  Dispo:

## 2021-03-12 NOTE — DISCHARGE NOTE PROVIDER - CARE PROVIDERS DIRECT ADDRESSES
,DirectAddress_Unknown ,DirectAddress_Unknown,[email or web address]
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
## 2021-03-12 NOTE — DISCHARGE NOTE PROVIDER - CARE PROVIDER_API CALL
Shaye Car)  Cardiovascular Medicine  [address], [address]  [place] [address]  Phone: [phone]  Fax: [phone]  Follow Up Time:    Shaye Car)  Cardiovascular Medicine  [address], [address]  [place] [address]  Phone: [phone]  Fax: [phone]  Established Patient  Follow Up Time:     Nieves Schumacher)  Surgery; Vascular Surgery  [address] [place] [address]  Phone: [phone]  Fax: [phone]  Established Patient  Follow Up Time:     Rajat Monsalve)  Cardiac Electrophysiology; Cardiovascular Disease; Internal Medicine  [address] [place] [address]  Phone: [phone]  Fax: [phone]  Established Patient  Scheduled Appointment: 04/15/2021 11:20 AM

## 2021-03-12 NOTE — PROGRESS NOTE ADULT - PROBLEM SELECTOR PLAN 1
-Pt woken from sleep with sudden chest pressure w/ no radiation a/w dizziness, diaphoresis and SOB  -Noted to be in new onset atrial flutter in ER  -ECG: Aflutter @ 154 w/ no ischemic changes. Trop negative x2  -S/p Diltiazem 10 IV x1, Lopressor 25mg PO x1 and Metoprolol tartrate 5mg IV x2 in ER  -Spontaneously converted to NSR at 3/11 @ 7pm (ECG in chart)   -EP following and plan for ablation 3/12  -CONT: Lopressor 25mg BID and Eliquis 5mg BID per EP.   -TFTs wnl  -TTE: Normal LV size and function, EF 71% without wma. grade II diastolic dysfunction. no significant valvular disease.
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
## 2021-03-12 NOTE — PROGRESS NOTE ADULT - PROBLEM SELECTOR PLAN 3
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

## 2021-03-12 NOTE — DISCHARGE NOTE PROVIDER - NSDCMRMEDTOKEN_GEN_ALL_CORE_FT
divalproex sodium 500 mg oral delayed release tablet: 1 tab(s) orally 3 times a day  doxazosin 2 mg oral tablet: 1 tab(s) orally once a day  gabapentin 400 mg oral capsule: 1 cap(s) orally 3 times a day  hydroCHLOROthiazide 25 mg oral tablet: 1 tab(s) orally once a day  lisinopril 10 mg oral tablet: 1 tab(s) orally once a day  Motrin 600 mg oral tablet: 1 tab(s) orally every 6 hours  Pepcid 40 mg oral tablet: 1 tab(s) orally once a day (at bedtime)  simvastatin 20 mg oral tablet: 1 tab(s) orally once a day (at bedtime)   apixaban 5 mg oral tablet: 1 tab(s) orally every 12 hours  cephalexin 500 mg oral tablet: 1 tab(s) orally 4 times a day   divalproex sodium 500 mg oral delayed release tablet: 1 tab(s) orally 3 times a day  doxazosin 2 mg oral tablet: 1 tab(s) orally once a day  gabapentin 400 mg oral capsule: 1 cap(s) orally 3 times a day  hydroCHLOROthiazide 25 mg oral tablet: 1 tab(s) orally once a day  lisinopril 10 mg oral tablet: 1 tab(s) orally once a day  metoprolol tartrate 25 mg oral tablet: 1 tab(s) orally every 12 hours  Motrin 600 mg oral tablet: 1 tab(s) orally every 6 hours  Pepcid 40 mg oral tablet: 1 tab(s) orally once a day (at bedtime)  simvastatin 20 mg oral tablet: 1 tab(s) orally once a day (at bedtime)
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
## 2021-03-13 ENCOUNTER — TRANSCRIPTION ENCOUNTER (OUTPATIENT)
Age: 66
End: 2021-03-13

## 2021-03-13 VITALS — TEMPERATURE: 98 F

## 2021-03-13 LAB
ANION GAP SERPL CALC-SCNC: 11 MMOL/L — SIGNIFICANT CHANGE UP (ref 5–17)
BASOPHILS # BLD AUTO: 0.02 K/UL — SIGNIFICANT CHANGE UP (ref 0–0.2)
BASOPHILS NFR BLD AUTO: 0.1 % — SIGNIFICANT CHANGE UP (ref 0–2)
BUN SERPL-MCNC: 18 MG/DL — SIGNIFICANT CHANGE UP (ref 7–23)
CALCIUM SERPL-MCNC: 9.3 MG/DL — SIGNIFICANT CHANGE UP (ref 8.4–10.5)
CHLORIDE SERPL-SCNC: 102 MMOL/L — SIGNIFICANT CHANGE UP (ref 96–108)
CO2 SERPL-SCNC: 26 MMOL/L — SIGNIFICANT CHANGE UP (ref 22–31)
CREAT SERPL-MCNC: 0.95 MG/DL — SIGNIFICANT CHANGE UP (ref 0.5–1.3)
EOSINOPHIL # BLD AUTO: 0 K/UL — SIGNIFICANT CHANGE UP (ref 0–0.5)
EOSINOPHIL NFR BLD AUTO: 0 % — SIGNIFICANT CHANGE UP (ref 0–6)
GLUCOSE SERPL-MCNC: 124 MG/DL — HIGH (ref 70–99)
HCT VFR BLD CALC: 43.4 % — SIGNIFICANT CHANGE UP (ref 39–50)
HGB BLD-MCNC: 14.1 G/DL — SIGNIFICANT CHANGE UP (ref 13–17)
IMM GRANULOCYTES NFR BLD AUTO: 0.8 % — SIGNIFICANT CHANGE UP (ref 0–1.5)
LYMPHOCYTES # BLD AUTO: 1.22 K/UL — SIGNIFICANT CHANGE UP (ref 1–3.3)
LYMPHOCYTES # BLD AUTO: 8.2 % — LOW (ref 13–44)
MAGNESIUM SERPL-MCNC: 2 MG/DL — SIGNIFICANT CHANGE UP (ref 1.6–2.6)
MCHC RBC-ENTMCNC: 30.8 PG — SIGNIFICANT CHANGE UP (ref 27–34)
MCHC RBC-ENTMCNC: 32.5 GM/DL — SIGNIFICANT CHANGE UP (ref 32–36)
MCV RBC AUTO: 94.8 FL — SIGNIFICANT CHANGE UP (ref 80–100)
MONOCYTES # BLD AUTO: 0.55 K/UL — SIGNIFICANT CHANGE UP (ref 0–0.9)
MONOCYTES NFR BLD AUTO: 3.7 % — SIGNIFICANT CHANGE UP (ref 2–14)
NEUTROPHILS # BLD AUTO: 13 K/UL — HIGH (ref 1.8–7.4)
NEUTROPHILS NFR BLD AUTO: 87.2 % — HIGH (ref 43–77)
NRBC # BLD: 0 /100 WBCS — SIGNIFICANT CHANGE UP (ref 0–0)
PLATELET # BLD AUTO: 211 K/UL — SIGNIFICANT CHANGE UP (ref 150–400)
POTASSIUM SERPL-MCNC: 4.4 MMOL/L — SIGNIFICANT CHANGE UP (ref 3.5–5.3)
POTASSIUM SERPL-SCNC: 4.4 MMOL/L — SIGNIFICANT CHANGE UP (ref 3.5–5.3)
RBC # BLD: 4.58 M/UL — SIGNIFICANT CHANGE UP (ref 4.2–5.8)
RBC # FLD: 14.8 % — HIGH (ref 10.3–14.5)
SODIUM SERPL-SCNC: 139 MMOL/L — SIGNIFICANT CHANGE UP (ref 135–145)
WBC # BLD: 14.69 K/UL — HIGH (ref 3.8–10.5)
WBC # FLD AUTO: 14.69 K/UL — HIGH (ref 3.8–10.5)

## 2021-03-13 PROCEDURE — 99232 SBSQ HOSP IP/OBS MODERATE 35: CPT

## 2021-03-13 RX ORDER — CEPHALEXIN 500 MG
1 CAPSULE ORAL
Qty: 28 | Refills: 0
Start: 2021-03-13 | End: 2021-03-19

## 2021-03-13 RX ORDER — APIXABAN 2.5 MG/1
1 TABLET, FILM COATED ORAL
Qty: 60 | Refills: 0
Start: 2021-03-13 | End: 2021-04-11

## 2021-03-13 RX ORDER — METOPROLOL TARTRATE 50 MG
1 TABLET ORAL
Qty: 60 | Refills: 0
Start: 2021-03-13 | End: 2021-04-11

## 2021-03-13 RX ADMIN — APIXABAN 5 MILLIGRAM(S): 2.5 TABLET, FILM COATED ORAL at 10:21

## 2021-03-13 RX ADMIN — Medication 25 MILLIGRAM(S): at 10:21

## 2021-03-13 RX ADMIN — DIVALPROEX SODIUM 500 MILLIGRAM(S): 500 TABLET, DELAYED RELEASE ORAL at 05:52

## 2021-03-13 RX ADMIN — FAMOTIDINE 40 MILLIGRAM(S): 10 INJECTION INTRAVENOUS at 10:22

## 2021-03-13 RX ADMIN — LISINOPRIL 10 MILLIGRAM(S): 2.5 TABLET ORAL at 05:53

## 2021-03-13 RX ADMIN — GABAPENTIN 400 MILLIGRAM(S): 400 CAPSULE ORAL at 05:52

## 2021-03-13 RX ADMIN — Medication 25 MILLIGRAM(S): at 05:52
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
## 2021-03-13 NOTE — PROGRESS NOTE ADULT - SUBJECTIVE AND OBJECTIVE BOX
EPS Progress Note    S: Patient doing well this morning. S/p CTI ablation yesterday for typical atrial flutter. No complaints. Ambulating without difficulty. No pain in the right groin.     T(C): 36.6 (03-13-21 @ 04:31), Max: 36.6 (03-13-21 @ 04:31)  HR: 60 (03-13-21 @ 08:43) (57 - 78)  BP: 140/73 (03-13-21 @ 08:43) (114/59 - 156/74)  RR: 18 (03-13-21 @ 08:43) (16 - 18)  SpO2: 96% (03-13-21 @ 08:43) (94% - 96%)  Wt(kg): --     Telemetry: SR          General:  No acute distress      Chest:  Chest is clear to auscultation bilaterally without wheezes, crackles, or rhonchi  Cardiac:  Regular rate and rhythm.  No murmur, rubs, or gallops heard.  Abdomen:  Soft without rebound or guarding.  Bowel sounds are present in all 4 quadrants.    Extremities: Bilateral slight edema and redness, unchanged from prior. Right groin soft, no hematoma or TTP.       MEDICATIONS  (STANDING):  apixaban 5 milliGRAM(s) Oral every 12 hours  diVALproex  milliGRAM(s) Oral three times a day  doxazosin 2 milliGRAM(s) Oral at bedtime  famotidine    Tablet 40 milliGRAM(s) Oral daily  gabapentin 400 milliGRAM(s) Oral three times a day  hydrochlorothiazide 25 milliGRAM(s) Oral daily  lisinopril 10 milliGRAM(s) Oral daily  metoprolol tartrate 25 milliGRAM(s) Oral every 12 hours  simvastatin 20 milliGRAM(s) Oral at bedtime    MEDICATIONS  (PRN):                                                         14.1   14.69 )-----------( 211      ( 13 Mar 2021 07:27 )             43.4     03-13    139  |  102  |  18  ----------------------------<  124<H>  4.4   |  26  |  0.95    Ca    9.3      13 Mar 2021 07:27  Mg     2.0     03-13    TPro  6.3  /  Alb  3.5  /  TBili  0.4  /  DBili  0.2  /  AST  17  /  ALT  19  /  AlkPhos  69  03-12    PT/INR - ( 11 Mar 2021 12:12 )   PT: 12.2 sec;   INR: 1.02          PTT - ( 11 Mar 2021 12:12 )  PTT:34.4 sec

## 2021-03-13 NOTE — DISCHARGE NOTE NURSING/CASE MANAGEMENT/SOCIAL WORK - PATIENT PORTAL LINK FT
You can access the FollowMyHealth Patient Portal offered by [facility] by registering at the following website: [email or web address]. By joining [employer]’s FollowMyHealth portal, you will also be able to view your health information using other applications (apps) compatible with our system.
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
## 2021-03-22 ENCOUNTER — APPOINTMENT (OUTPATIENT)
Age: 66
End: 2021-03-22
Payer: MEDICARE

## 2021-03-22 DIAGNOSIS — M21.372 FOOT DROP, LEFT FOOT: ICD-10-CM

## 2021-03-22 DIAGNOSIS — I10 ESSENTIAL (PRIMARY) HYPERTENSION: ICD-10-CM

## 2021-03-22 DIAGNOSIS — M10.9 GOUT, UNSPECIFIED: ICD-10-CM

## 2021-03-22 DIAGNOSIS — M87.251 OSTEONECROSIS DUE TO PREVIOUS TRAUMA, RIGHT FEMUR: ICD-10-CM

## 2021-03-22 DIAGNOSIS — I44.1 ATRIOVENTRICULAR BLOCK, SECOND DEGREE: ICD-10-CM

## 2021-03-22 DIAGNOSIS — G83.9 PARALYTIC SYNDROME, UNSPECIFIED: ICD-10-CM

## 2021-03-22 DIAGNOSIS — M87.252 OSTEONECROSIS DUE TO PREVIOUS TRAUMA, LEFT FEMUR: ICD-10-CM

## 2021-03-22 DIAGNOSIS — Z82.49 FAMILY HISTORY OF ISCHEMIC HEART DISEASE AND OTHER DISEASES OF THE CIRCULATORY SYSTEM: ICD-10-CM

## 2021-03-22 DIAGNOSIS — Z88.8 ALLERGY STATUS TO OTHER DRUGS, MEDICAMENTS AND BIOLOGICAL SUBSTANCES STATUS: ICD-10-CM

## 2021-03-22 DIAGNOSIS — E78.5 HYPERLIPIDEMIA, UNSPECIFIED: ICD-10-CM

## 2021-03-22 DIAGNOSIS — I48.92 UNSPECIFIED ATRIAL FLUTTER: ICD-10-CM

## 2021-03-22 DIAGNOSIS — N40.0 BENIGN PROSTATIC HYPERPLASIA WITHOUT LOWER URINARY TRACT SYMPTOMS: ICD-10-CM

## 2021-03-22 DIAGNOSIS — I48.3 TYPICAL ATRIAL FLUTTER: ICD-10-CM

## 2021-03-22 DIAGNOSIS — I87.2 VENOUS INSUFFICIENCY (CHRONIC) (PERIPHERAL): ICD-10-CM

## 2021-03-22 DIAGNOSIS — K21.9 GASTRO-ESOPHAGEAL REFLUX DISEASE WITHOUT ESOPHAGITIS: ICD-10-CM

## 2021-03-22 DIAGNOSIS — G62.9 POLYNEUROPATHY, UNSPECIFIED: ICD-10-CM

## 2021-03-22 DIAGNOSIS — M87.28: ICD-10-CM

## 2021-03-22 DIAGNOSIS — F32.9 MAJOR DEPRESSIVE DISORDER, SINGLE EPISODE, UNSPECIFIED: ICD-10-CM

## 2021-03-22 DIAGNOSIS — Z94.81 BONE MARROW TRANSPLANT STATUS: ICD-10-CM

## 2021-03-22 DIAGNOSIS — Z94.84 STEM CELLS TRANSPLANT STATUS: ICD-10-CM

## 2021-03-22 PROCEDURE — 0002A: CPT

## 2021-03-23 PROCEDURE — C1887: CPT

## 2021-03-23 PROCEDURE — 96376 TX/PRO/DX INJ SAME DRUG ADON: CPT

## 2021-03-23 PROCEDURE — 87040 BLOOD CULTURE FOR BACTERIA: CPT

## 2021-03-23 PROCEDURE — C1894: CPT

## 2021-03-23 PROCEDURE — U0003: CPT

## 2021-03-23 PROCEDURE — 93306 TTE W/DOPPLER COMPLETE: CPT

## 2021-03-23 PROCEDURE — 83036 HEMOGLOBIN GLYCOSYLATED A1C: CPT

## 2021-03-23 PROCEDURE — 96375 TX/PRO/DX INJ NEW DRUG ADDON: CPT

## 2021-03-23 PROCEDURE — 84436 ASSAY OF TOTAL THYROXINE: CPT

## 2021-03-23 PROCEDURE — 84484 ASSAY OF TROPONIN QUANT: CPT

## 2021-03-23 PROCEDURE — 83605 ASSAY OF LACTIC ACID: CPT

## 2021-03-23 PROCEDURE — 85730 THROMBOPLASTIN TIME PARTIAL: CPT

## 2021-03-23 PROCEDURE — 96365 THER/PROPH/DIAG IV INF INIT: CPT

## 2021-03-23 PROCEDURE — C1733: CPT

## 2021-03-23 PROCEDURE — 80053 COMPREHEN METABOLIC PANEL: CPT

## 2021-03-23 PROCEDURE — U0005: CPT

## 2021-03-23 PROCEDURE — 36415 COLL VENOUS BLD VENIPUNCTURE: CPT

## 2021-03-23 PROCEDURE — 80048 BASIC METABOLIC PNL TOTAL CA: CPT

## 2021-03-23 PROCEDURE — 85025 COMPLETE CBC W/AUTO DIFF WBC: CPT

## 2021-03-23 PROCEDURE — 82553 CREATINE MB FRACTION: CPT

## 2021-03-23 PROCEDURE — C1731: CPT

## 2021-03-23 PROCEDURE — 84443 ASSAY THYROID STIM HORMONE: CPT

## 2021-03-23 PROCEDURE — 99285 EMERGENCY DEPT VISIT HI MDM: CPT | Mod: 25

## 2021-03-23 PROCEDURE — 84480 ASSAY TRIIODOTHYRONINE (T3): CPT

## 2021-03-23 PROCEDURE — 85027 COMPLETE CBC AUTOMATED: CPT

## 2021-03-23 PROCEDURE — 80076 HEPATIC FUNCTION PANEL: CPT

## 2021-03-23 PROCEDURE — 71045 X-RAY EXAM CHEST 1 VIEW: CPT

## 2021-03-23 PROCEDURE — 97161 PT EVAL LOW COMPLEX 20 MIN: CPT

## 2021-03-23 PROCEDURE — 93005 ELECTROCARDIOGRAM TRACING: CPT

## 2021-03-23 PROCEDURE — 96366 THER/PROPH/DIAG IV INF ADDON: CPT

## 2021-03-23 PROCEDURE — 83735 ASSAY OF MAGNESIUM: CPT

## 2021-03-23 PROCEDURE — C1893: CPT

## 2021-03-23 PROCEDURE — 82550 ASSAY OF CK (CPK): CPT

## 2021-03-23 PROCEDURE — 85610 PROTHROMBIN TIME: CPT

## 2021-03-23 PROCEDURE — 93970 EXTREMITY STUDY: CPT

## 2021-03-23 PROCEDURE — 85379 FIBRIN DEGRADATION QUANT: CPT

## 2021-03-23 PROCEDURE — 80061 LIPID PANEL: CPT

## 2021-03-26 ENCOUNTER — APPOINTMENT (OUTPATIENT)
Dept: VASCULAR SURGERY | Facility: CLINIC | Age: 66
End: 2021-03-26
Payer: MEDICARE

## 2021-03-26 PROCEDURE — 99213 OFFICE O/P EST LOW 20 MIN: CPT

## 2021-03-26 PROCEDURE — 99072 ADDL SUPL MATRL&STAF TM PHE: CPT

## 2021-03-28 NOTE — PHYSICAL EXAM
[Respiratory Effort] : normal respiratory effort [Normal Rate and Rhythm] : normal rate and rhythm [Ankle Swelling (On Exam)] : present [Ankle Swelling Bilaterally] : bilaterally  [Ankle Swelling On The Right] : mild [Alert] : alert [Oriented to Person] : oriented to person [Oriented to Place] : oriented to place [Oriented to Time] : oriented to time [Calm] : calm [Varicose Veins Of Lower Extremities] : not present [] : not present [Abdomen Tenderness] : ~T ~M No abdominal tenderness [de-identified] : Well appearing  [de-identified] : NC/AT  [de-identified] : Supple  [de-identified] : FROM ambulates with a cane  [de-identified] : Chronic stasis dermatitis to the b/l les.

## 2021-03-28 NOTE — ADDENDUM
[FreeTextEntry1] : This note was written by Bella Hewitt on 03/26/2021 acting as scribe for Nieves Shrestha M.D.\par \par I, Nieves Pinedo have read and attest that all the information, medical decision making and discharge instructions within are true and accurate.

## 2021-03-28 NOTE — ASSESSMENT
[FreeTextEntry1] : 65 year old male with recurrent BLE cellulitis returns for a f/u. On exam no open wounds, edema is better controlled and chronic stasis dermatitis, cellulitis now resolved no concern for infection. Patient recommended he continue to wrap legs with ACE bandages as this will help reduce LE swelling. He may f/u here as needed.
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
## 2021-04-15 ENCOUNTER — NON-APPOINTMENT (OUTPATIENT)
Age: 66
End: 2021-04-15

## 2021-04-15 ENCOUNTER — APPOINTMENT (OUTPATIENT)
Dept: HEART AND VASCULAR | Facility: CLINIC | Age: 66
End: 2021-04-15
Payer: MEDICARE

## 2021-04-15 VITALS
SYSTOLIC BLOOD PRESSURE: 153 MMHG | WEIGHT: 240 LBS | DIASTOLIC BLOOD PRESSURE: 82 MMHG | HEIGHT: 70 IN | HEART RATE: 64 BPM | TEMPERATURE: 95.6 F | BODY MASS INDEX: 34.36 KG/M2

## 2021-04-15 PROCEDURE — 99072 ADDL SUPL MATRL&STAF TM PHE: CPT

## 2021-04-15 PROCEDURE — 93000 ELECTROCARDIOGRAM COMPLETE: CPT

## 2021-04-15 PROCEDURE — 99213 OFFICE O/P EST LOW 20 MIN: CPT
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
## 2021-04-30 NOTE — PHYSICAL EXAM
[Well Developed] : well developed [Well Nourished] : well nourished [No Acute Distress] : no acute distress [Normal S1, S2] : normal S1, S2 [No Murmur] : no murmur [No Rub] : no rub [No Gallop] : no gallop [Clear Lung Fields] : clear lung fields [Good Air Entry] : good air entry [No Respiratory Distress] : no respiratory distress  [Soft] : abdomen soft [Non Tender] : non-tender [No Masses/organomegaly] : no masses/organomegaly [Normal Bowel Sounds] : normal bowel sounds [Abnormal Gait] : abnormal gait
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
## 2021-07-19 RX ORDER — SILVER SULFADIAZINE 10 MG/G
1 CREAM TOPICAL TWICE DAILY
Qty: 30 | Refills: 1 | Status: ACTIVE | COMMUNITY
Start: 2018-12-28 | End: 1900-01-01
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
## 2021-07-23 ENCOUNTER — INPATIENT (INPATIENT)
Facility: HOSPITAL | Age: 66
LOS: 1 days | Discharge: HOME | DRG: 603 | End: 2021-07-25
Attending: SURGERY | Admitting: SURGERY
Payer: MEDICARE

## 2021-07-23 ENCOUNTER — APPOINTMENT (OUTPATIENT)
Dept: VASCULAR SURGERY | Facility: CLINIC | Age: 66
End: 2021-07-23
Payer: MEDICARE

## 2021-07-23 VITALS
SYSTOLIC BLOOD PRESSURE: 135 MMHG | OXYGEN SATURATION: 96 % | HEART RATE: 60 BPM | TEMPERATURE: 98 F | HEIGHT: 69 IN | RESPIRATION RATE: 20 BRPM | DIASTOLIC BLOOD PRESSURE: 89 MMHG | WEIGHT: 259.93 LBS

## 2021-07-23 DIAGNOSIS — Z41.9 ENCOUNTER FOR PROCEDURE FOR PURPOSES OTHER THAN REMEDYING HEALTH STATE, UNSPECIFIED: Chronic | ICD-10-CM

## 2021-07-23 DIAGNOSIS — T24.309A BURN OF THIRD DEGREE OF UNSPECIFIED SITE OF UNSPECIFIED LOWER LIMB, EXCEPT ANKLE AND FOOT, INITIAL ENCOUNTER: Chronic | ICD-10-CM

## 2021-07-23 DIAGNOSIS — Z98.890 OTHER SPECIFIED POSTPROCEDURAL STATES: Chronic | ICD-10-CM

## 2021-07-23 DIAGNOSIS — K46.9 UNSPECIFIED ABDOMINAL HERNIA WITHOUT OBSTRUCTION OR GANGRENE: Chronic | ICD-10-CM

## 2021-07-23 DIAGNOSIS — T14.8 OTHER INJURY OF UNSPECIFIED BODY REGION: Chronic | ICD-10-CM

## 2021-07-23 LAB
ALBUMIN SERPL ELPH-MCNC: 3.9 G/DL — SIGNIFICANT CHANGE UP (ref 3.3–5)
ALP SERPL-CCNC: 58 U/L — SIGNIFICANT CHANGE UP (ref 40–120)
ALT FLD-CCNC: 13 U/L — SIGNIFICANT CHANGE UP (ref 10–45)
ANION GAP SERPL CALC-SCNC: 8 MMOL/L — SIGNIFICANT CHANGE UP (ref 5–17)
APTT BLD: 32.7 SEC — SIGNIFICANT CHANGE UP (ref 27.5–35.5)
AST SERPL-CCNC: 13 U/L — SIGNIFICANT CHANGE UP (ref 10–40)
BASOPHILS # BLD AUTO: 0.06 K/UL — SIGNIFICANT CHANGE UP (ref 0–0.2)
BASOPHILS NFR BLD AUTO: 0.6 % — SIGNIFICANT CHANGE UP (ref 0–2)
BILIRUB SERPL-MCNC: 0.3 MG/DL — SIGNIFICANT CHANGE UP (ref 0.2–1.2)
BUN SERPL-MCNC: 12 MG/DL — SIGNIFICANT CHANGE UP (ref 7–23)
CALCIUM SERPL-MCNC: 9.3 MG/DL — SIGNIFICANT CHANGE UP (ref 8.4–10.5)
CHLORIDE SERPL-SCNC: 102 MMOL/L — SIGNIFICANT CHANGE UP (ref 96–108)
CO2 SERPL-SCNC: 28 MMOL/L — SIGNIFICANT CHANGE UP (ref 22–31)
CREAT SERPL-MCNC: 1.16 MG/DL — SIGNIFICANT CHANGE UP (ref 0.5–1.3)
EOSINOPHIL # BLD AUTO: 0.21 K/UL — SIGNIFICANT CHANGE UP (ref 0–0.5)
EOSINOPHIL NFR BLD AUTO: 2.2 % — SIGNIFICANT CHANGE UP (ref 0–6)
GLUCOSE SERPL-MCNC: 114 MG/DL — HIGH (ref 70–99)
HCT VFR BLD CALC: 44.7 % — SIGNIFICANT CHANGE UP (ref 39–50)
HGB BLD-MCNC: 14.7 G/DL — SIGNIFICANT CHANGE UP (ref 13–17)
IMM GRANULOCYTES NFR BLD AUTO: 1.6 % — HIGH (ref 0–1.5)
INR BLD: 1.06 — SIGNIFICANT CHANGE UP (ref 0.88–1.16)
LACTATE SERPL-SCNC: 1.9 MMOL/L — SIGNIFICANT CHANGE UP (ref 0.5–2)
LYMPHOCYTES # BLD AUTO: 2.46 K/UL — SIGNIFICANT CHANGE UP (ref 1–3.3)
LYMPHOCYTES # BLD AUTO: 25.3 % — SIGNIFICANT CHANGE UP (ref 13–44)
MCHC RBC-ENTMCNC: 30.7 PG — SIGNIFICANT CHANGE UP (ref 27–34)
MCHC RBC-ENTMCNC: 32.9 GM/DL — SIGNIFICANT CHANGE UP (ref 32–36)
MCV RBC AUTO: 93.3 FL — SIGNIFICANT CHANGE UP (ref 80–100)
MONOCYTES # BLD AUTO: 1.05 K/UL — HIGH (ref 0–0.9)
MONOCYTES NFR BLD AUTO: 10.8 % — SIGNIFICANT CHANGE UP (ref 2–14)
NEUTROPHILS # BLD AUTO: 5.77 K/UL — SIGNIFICANT CHANGE UP (ref 1.8–7.4)
NEUTROPHILS NFR BLD AUTO: 59.5 % — SIGNIFICANT CHANGE UP (ref 43–77)
NRBC # BLD: 0 /100 WBCS — SIGNIFICANT CHANGE UP (ref 0–0)
PLATELET # BLD AUTO: 212 K/UL — SIGNIFICANT CHANGE UP (ref 150–400)
POTASSIUM SERPL-MCNC: 3.8 MMOL/L — SIGNIFICANT CHANGE UP (ref 3.5–5.3)
POTASSIUM SERPL-SCNC: 3.8 MMOL/L — SIGNIFICANT CHANGE UP (ref 3.5–5.3)
PROT SERPL-MCNC: 6.7 G/DL — SIGNIFICANT CHANGE UP (ref 6–8.3)
PROTHROM AB SERPL-ACNC: 12.7 SEC — SIGNIFICANT CHANGE UP (ref 10.6–13.6)
RBC # BLD: 4.79 M/UL — SIGNIFICANT CHANGE UP (ref 4.2–5.8)
RBC # FLD: 13.4 % — SIGNIFICANT CHANGE UP (ref 10.3–14.5)
SARS-COV-2 RNA SPEC QL NAA+PROBE: NEGATIVE — SIGNIFICANT CHANGE UP
SODIUM SERPL-SCNC: 138 MMOL/L — SIGNIFICANT CHANGE UP (ref 135–145)
WBC # BLD: 9.71 K/UL — SIGNIFICANT CHANGE UP (ref 3.8–10.5)
WBC # FLD AUTO: 9.71 K/UL — SIGNIFICANT CHANGE UP (ref 3.8–10.5)

## 2021-07-23 PROCEDURE — 99072 ADDL SUPL MATRL&STAF TM PHE: CPT

## 2021-07-23 PROCEDURE — 99285 EMERGENCY DEPT VISIT HI MDM: CPT

## 2021-07-23 PROCEDURE — 99213 OFFICE O/P EST LOW 20 MIN: CPT

## 2021-07-23 PROCEDURE — XXXXX: CPT

## 2021-07-23 RX ORDER — DIVALPROEX SODIUM 500 MG/1
500 TABLET, DELAYED RELEASE ORAL EVERY 8 HOURS
Refills: 0 | Status: DISCONTINUED | OUTPATIENT
Start: 2021-07-23 | End: 2021-07-25

## 2021-07-23 RX ORDER — LISINOPRIL 2.5 MG/1
10 TABLET ORAL DAILY
Refills: 0 | Status: DISCONTINUED | OUTPATIENT
Start: 2021-07-23 | End: 2021-07-25

## 2021-07-23 RX ORDER — GABAPENTIN 400 MG/1
600 CAPSULE ORAL THREE TIMES A DAY
Refills: 0 | Status: DISCONTINUED | OUTPATIENT
Start: 2021-07-23 | End: 2021-07-25

## 2021-07-23 RX ORDER — SIMVASTATIN 20 MG/1
10 TABLET, FILM COATED ORAL AT BEDTIME
Refills: 0 | Status: DISCONTINUED | OUTPATIENT
Start: 2021-07-23 | End: 2021-07-25

## 2021-07-23 RX ORDER — HEPARIN SODIUM 5000 [USP'U]/ML
7500 INJECTION INTRAVENOUS; SUBCUTANEOUS EVERY 8 HOURS
Refills: 0 | Status: DISCONTINUED | OUTPATIENT
Start: 2021-07-23 | End: 2021-07-23

## 2021-07-23 RX ORDER — VANCOMYCIN HCL 1 G
1000 VIAL (EA) INTRAVENOUS ONCE
Refills: 0 | Status: COMPLETED | OUTPATIENT
Start: 2021-07-23 | End: 2021-07-23

## 2021-07-23 RX ORDER — DOXAZOSIN MESYLATE 4 MG
2 TABLET ORAL AT BEDTIME
Refills: 0 | Status: DISCONTINUED | OUTPATIENT
Start: 2021-07-23 | End: 2021-07-25

## 2021-07-23 RX ORDER — CEFAZOLIN SODIUM 1 G
VIAL (EA) INJECTION
Refills: 0 | Status: DISCONTINUED | OUTPATIENT
Start: 2021-07-23 | End: 2021-07-23

## 2021-07-23 RX ORDER — HYDROCHLOROTHIAZIDE 25 MG
25 TABLET ORAL DAILY
Refills: 0 | Status: DISCONTINUED | OUTPATIENT
Start: 2021-07-23 | End: 2021-07-25

## 2021-07-23 RX ORDER — MORPHINE SULFATE 50 MG/1
4 CAPSULE, EXTENDED RELEASE ORAL ONCE
Refills: 0 | Status: DISCONTINUED | OUTPATIENT
Start: 2021-07-23 | End: 2021-07-23

## 2021-07-23 RX ORDER — APIXABAN 2.5 MG/1
5 TABLET, FILM COATED ORAL EVERY 12 HOURS
Refills: 0 | Status: DISCONTINUED | OUTPATIENT
Start: 2021-07-23 | End: 2021-07-23

## 2021-07-23 RX ORDER — METOPROLOL TARTRATE 50 MG
25 TABLET ORAL
Refills: 0 | Status: DISCONTINUED | OUTPATIENT
Start: 2021-07-23 | End: 2021-07-25

## 2021-07-23 RX ORDER — FAMOTIDINE 10 MG/ML
20 INJECTION INTRAVENOUS DAILY
Refills: 0 | Status: DISCONTINUED | OUTPATIENT
Start: 2021-07-23 | End: 2021-07-25

## 2021-07-23 RX ORDER — ACETAMINOPHEN 500 MG
650 TABLET ORAL EVERY 6 HOURS
Refills: 0 | Status: DISCONTINUED | OUTPATIENT
Start: 2021-07-23 | End: 2021-07-25

## 2021-07-23 RX ORDER — ALLOPURINOL 300 MG
300 TABLET ORAL DAILY
Refills: 0 | Status: DISCONTINUED | OUTPATIENT
Start: 2021-07-23 | End: 2021-07-25

## 2021-07-23 RX ORDER — APIXABAN 2.5 MG/1
5 TABLET, FILM COATED ORAL
Refills: 0 | Status: DISCONTINUED | OUTPATIENT
Start: 2021-07-23 | End: 2021-07-25

## 2021-07-23 RX ORDER — CEFAZOLIN SODIUM 1 G
2000 VIAL (EA) INJECTION EVERY 8 HOURS
Refills: 0 | Status: DISCONTINUED | OUTPATIENT
Start: 2021-07-23 | End: 2021-07-25

## 2021-07-23 RX ORDER — PIPERACILLIN AND TAZOBACTAM 4; .5 G/20ML; G/20ML
3.38 INJECTION, POWDER, LYOPHILIZED, FOR SOLUTION INTRAVENOUS ONCE
Refills: 0 | Status: COMPLETED | OUTPATIENT
Start: 2021-07-23 | End: 2021-07-23

## 2021-07-23 RX ADMIN — APIXABAN 5 MILLIGRAM(S): 2.5 TABLET, FILM COATED ORAL at 22:16

## 2021-07-23 RX ADMIN — Medication 25 MILLIGRAM(S): at 19:13

## 2021-07-23 RX ADMIN — Medication 2000 MILLIGRAM(S): at 22:16

## 2021-07-23 RX ADMIN — GABAPENTIN 600 MILLIGRAM(S): 400 CAPSULE ORAL at 19:13

## 2021-07-23 RX ADMIN — FAMOTIDINE 20 MILLIGRAM(S): 10 INJECTION INTRAVENOUS at 19:13

## 2021-07-23 RX ADMIN — DIVALPROEX SODIUM 500 MILLIGRAM(S): 500 TABLET, DELAYED RELEASE ORAL at 19:13

## 2021-07-23 RX ADMIN — Medication 2 MILLIGRAM(S): at 19:47

## 2021-07-23 RX ADMIN — MORPHINE SULFATE 4 MILLIGRAM(S): 50 CAPSULE, EXTENDED RELEASE ORAL at 16:31

## 2021-07-23 RX ADMIN — PIPERACILLIN AND TAZOBACTAM 200 GRAM(S): 4; .5 INJECTION, POWDER, LYOPHILIZED, FOR SOLUTION INTRAVENOUS at 15:49

## 2021-07-23 RX ADMIN — Medication 650 MILLIGRAM(S): at 23:16

## 2021-07-23 RX ADMIN — SIMVASTATIN 10 MILLIGRAM(S): 20 TABLET, FILM COATED ORAL at 22:15

## 2021-07-23 RX ADMIN — Medication 250 MILLIGRAM(S): at 16:32

## 2021-07-23 RX ADMIN — LISINOPRIL 10 MILLIGRAM(S): 2.5 TABLET ORAL at 19:13

## 2021-07-23 RX ADMIN — Medication 650 MILLIGRAM(S): at 22:16

## 2021-07-23 RX ADMIN — Medication 25 MILLIGRAM(S): at 19:12

## 2021-07-23 RX ADMIN — Medication 300 MILLIGRAM(S): at 19:13

## 2021-07-23 NOTE — ED ADULT NURSE NOTE - OBJECTIVE STATEMENT
Patient sent to Ed by Dr. Schumacher "to be admitted to get IV antibiotics for my swollen legs." Pt complains of lower extremeties swelling, rednesss and "chronic wounds" since about 2 weeks ago. pt finished course of oral abxs with no relief. Pt also complains of increased difficult walking. Hx Afib on eliquis, ablation 03/21. Denies chest pain, SOB, fevers or chills.

## 2021-07-23 NOTE — ED ADULT TRIAGE NOTE - CHIEF COMPLAINT QUOTE
Patient sent to Ed by Dr. Schumacher "to be admitted to get IV antibiotics for my swollen legs." Hx Afib on eliquis, ablation 03/21. Denies chest pain, SOB, fevers or chills.

## 2021-07-23 NOTE — H&P ADULT - NSHPPHYSICALEXAM_GEN_ALL_CORE
Constitutional: Pleasant conversational man.  Cardiac: NSR  Respiratory: Equal and bilateral chest rise. No acute respiratory distress.  Abdomen: Soft, nontender, nondistended, obese.  Extremities: RLE with open ulcers present on the anterior shin surrounded by mild erythema. Mild discharge that is serous. LLE with chronic venous dermatitis color changes and with small ulceration present on posterior calf region with mild serous discharge.  Vascular: Palpable DP bilaterally. Triphasic PT and popliteal bilaterally. Palpable femoral pulses bilaterally.

## 2021-07-23 NOTE — ED PROVIDER NOTE - MUSCULOSKELETAL, MLM
Spine appears normal, range of motion is not limited, no muscle or joint tenderness; LE w/b/l dressings in place, + swelling b/l, + erythema b/l, + warmth b/l, pulses = b/l
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
## 2021-07-23 NOTE — ED PROVIDER NOTE - CLINICAL SUMMARY MEDICAL DECISION MAKING FREE TEXT BOX
pt w/LE cellulitis, failed po abx as outpatient, sent for admission for iv abx -- afebrile, pan cultured, given iv abx, will admit to vascular tele as per dr nixon
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
## 2021-07-23 NOTE — ED PROVIDER NOTE - ATTENDING CONTRIBUTION TO CARE
65 yo male h/o HTN, HLD, gerd, gout, depression c/o bilat LE redness, pain sent by Dr Schumacher for admit after failing outpt abx.  Pt initially w infection starting 3 wk ago, no sig improvement on oral abx.  No fever.  NAD, nc/at, lung cta, heart reg, abd soft, nt, bilat LE edema, erythema, scattered ulcers ant R shin, L posterior leg and lateral leg, dp 1+ bilat, no gross neuro deficits  Pt w ongoing cellulitis, LE ulcers despite oral abx tba to Dr Schumacher for iv abx and wound care.  Plan labs, abx, admit.
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
## 2021-07-24 LAB
ANION GAP SERPL CALC-SCNC: 12 MMOL/L — SIGNIFICANT CHANGE UP (ref 5–17)
BUN SERPL-MCNC: 14 MG/DL — SIGNIFICANT CHANGE UP (ref 7–23)
CALCIUM SERPL-MCNC: 9.2 MG/DL — SIGNIFICANT CHANGE UP (ref 8.4–10.5)
CHLORIDE SERPL-SCNC: 102 MMOL/L — SIGNIFICANT CHANGE UP (ref 96–108)
CO2 SERPL-SCNC: 23 MMOL/L — SIGNIFICANT CHANGE UP (ref 22–31)
COVID-19 SPIKE DOMAIN AB INTERP: POSITIVE
COVID-19 SPIKE DOMAIN ANTIBODY RESULT: >250 U/ML — HIGH
CREAT SERPL-MCNC: 1.02 MG/DL — SIGNIFICANT CHANGE UP (ref 0.5–1.3)
GLUCOSE SERPL-MCNC: 116 MG/DL — HIGH (ref 70–99)
HCT VFR BLD CALC: 47.4 % — SIGNIFICANT CHANGE UP (ref 39–50)
HGB BLD-MCNC: 15.1 G/DL — SIGNIFICANT CHANGE UP (ref 13–17)
MAGNESIUM SERPL-MCNC: 2.2 MG/DL — SIGNIFICANT CHANGE UP (ref 1.6–2.6)
MCHC RBC-ENTMCNC: 30.5 PG — SIGNIFICANT CHANGE UP (ref 27–34)
MCHC RBC-ENTMCNC: 31.9 GM/DL — LOW (ref 32–36)
MCV RBC AUTO: 95.8 FL — SIGNIFICANT CHANGE UP (ref 80–100)
NRBC # BLD: 0 /100 WBCS — SIGNIFICANT CHANGE UP (ref 0–0)
PHOSPHATE SERPL-MCNC: 3.2 MG/DL — SIGNIFICANT CHANGE UP (ref 2.5–4.5)
PLATELET # BLD AUTO: 203 K/UL — SIGNIFICANT CHANGE UP (ref 150–400)
POTASSIUM SERPL-MCNC: 4.2 MMOL/L — SIGNIFICANT CHANGE UP (ref 3.5–5.3)
POTASSIUM SERPL-SCNC: 4.2 MMOL/L — SIGNIFICANT CHANGE UP (ref 3.5–5.3)
RBC # BLD: 4.95 M/UL — SIGNIFICANT CHANGE UP (ref 4.2–5.8)
RBC # FLD: 13.6 % — SIGNIFICANT CHANGE UP (ref 10.3–14.5)
SARS-COV-2 IGG+IGM SERPL QL IA: >250 U/ML — HIGH
SARS-COV-2 IGG+IGM SERPL QL IA: POSITIVE
SODIUM SERPL-SCNC: 137 MMOL/L — SIGNIFICANT CHANGE UP (ref 135–145)
WBC # BLD: 8.95 K/UL — SIGNIFICANT CHANGE UP (ref 3.8–10.5)
WBC # FLD AUTO: 8.95 K/UL — SIGNIFICANT CHANGE UP (ref 3.8–10.5)

## 2021-07-24 PROCEDURE — 93970 EXTREMITY STUDY: CPT | Mod: 26

## 2021-07-24 PROCEDURE — 99223 1ST HOSP IP/OBS HIGH 75: CPT

## 2021-07-24 PROCEDURE — 99233 SBSQ HOSP IP/OBS HIGH 50: CPT | Mod: GC

## 2021-07-24 RX ORDER — MORPHINE SULFATE 50 MG/1
1 CAPSULE, EXTENDED RELEASE ORAL ONCE
Refills: 0 | Status: DISCONTINUED | OUTPATIENT
Start: 2021-07-24 | End: 2021-07-24

## 2021-07-24 RX ADMIN — Medication 2000 MILLIGRAM(S): at 07:16

## 2021-07-24 RX ADMIN — MORPHINE SULFATE 1 MILLIGRAM(S): 50 CAPSULE, EXTENDED RELEASE ORAL at 12:34

## 2021-07-24 RX ADMIN — LISINOPRIL 10 MILLIGRAM(S): 2.5 TABLET ORAL at 07:15

## 2021-07-24 RX ADMIN — Medication 650 MILLIGRAM(S): at 07:18

## 2021-07-24 RX ADMIN — GABAPENTIN 600 MILLIGRAM(S): 400 CAPSULE ORAL at 13:40

## 2021-07-24 RX ADMIN — DIVALPROEX SODIUM 500 MILLIGRAM(S): 500 TABLET, DELAYED RELEASE ORAL at 07:15

## 2021-07-24 RX ADMIN — Medication 650 MILLIGRAM(S): at 17:52

## 2021-07-24 RX ADMIN — FAMOTIDINE 20 MILLIGRAM(S): 10 INJECTION INTRAVENOUS at 12:36

## 2021-07-24 RX ADMIN — DIVALPROEX SODIUM 500 MILLIGRAM(S): 500 TABLET, DELAYED RELEASE ORAL at 21:50

## 2021-07-24 RX ADMIN — MORPHINE SULFATE 1 MILLIGRAM(S): 50 CAPSULE, EXTENDED RELEASE ORAL at 10:26

## 2021-07-24 RX ADMIN — APIXABAN 5 MILLIGRAM(S): 2.5 TABLET, FILM COATED ORAL at 17:52

## 2021-07-24 RX ADMIN — Medication 650 MILLIGRAM(S): at 18:50

## 2021-07-24 RX ADMIN — DIVALPROEX SODIUM 500 MILLIGRAM(S): 500 TABLET, DELAYED RELEASE ORAL at 13:40

## 2021-07-24 RX ADMIN — GABAPENTIN 600 MILLIGRAM(S): 400 CAPSULE ORAL at 21:49

## 2021-07-24 RX ADMIN — Medication 650 MILLIGRAM(S): at 08:18

## 2021-07-24 RX ADMIN — Medication 2000 MILLIGRAM(S): at 13:44

## 2021-07-24 RX ADMIN — Medication 300 MILLIGRAM(S): at 12:36

## 2021-07-24 RX ADMIN — Medication 25 MILLIGRAM(S): at 17:54

## 2021-07-24 RX ADMIN — GABAPENTIN 600 MILLIGRAM(S): 400 CAPSULE ORAL at 07:15

## 2021-07-24 RX ADMIN — SIMVASTATIN 10 MILLIGRAM(S): 20 TABLET, FILM COATED ORAL at 21:49

## 2021-07-24 RX ADMIN — Medication 2 MILLIGRAM(S): at 21:50

## 2021-07-24 RX ADMIN — Medication 25 MILLIGRAM(S): at 07:16

## 2021-07-24 RX ADMIN — Medication 2000 MILLIGRAM(S): at 21:48

## 2021-07-24 RX ADMIN — APIXABAN 5 MILLIGRAM(S): 2.5 TABLET, FILM COATED ORAL at 07:15
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
## 2021-07-25 ENCOUNTER — TRANSCRIPTION ENCOUNTER (OUTPATIENT)
Age: 66
End: 2021-07-25

## 2021-07-25 VITALS — TEMPERATURE: 97 F

## 2021-07-25 PROCEDURE — 84100 ASSAY OF PHOSPHORUS: CPT

## 2021-07-25 PROCEDURE — 80053 COMPREHEN METABOLIC PANEL: CPT

## 2021-07-25 PROCEDURE — 99285 EMERGENCY DEPT VISIT HI MDM: CPT | Mod: 25

## 2021-07-25 PROCEDURE — 87635 SARS-COV-2 COVID-19 AMP PRB: CPT

## 2021-07-25 PROCEDURE — 99233 SBSQ HOSP IP/OBS HIGH 50: CPT | Mod: GC

## 2021-07-25 PROCEDURE — 85027 COMPLETE CBC AUTOMATED: CPT

## 2021-07-25 PROCEDURE — 85610 PROTHROMBIN TIME: CPT

## 2021-07-25 PROCEDURE — 93970 EXTREMITY STUDY: CPT

## 2021-07-25 PROCEDURE — 83735 ASSAY OF MAGNESIUM: CPT

## 2021-07-25 PROCEDURE — 36415 COLL VENOUS BLD VENIPUNCTURE: CPT

## 2021-07-25 PROCEDURE — 85730 THROMBOPLASTIN TIME PARTIAL: CPT

## 2021-07-25 PROCEDURE — 85025 COMPLETE CBC W/AUTO DIFF WBC: CPT

## 2021-07-25 PROCEDURE — 86769 SARS-COV-2 COVID-19 ANTIBODY: CPT

## 2021-07-25 PROCEDURE — 96374 THER/PROPH/DIAG INJ IV PUSH: CPT

## 2021-07-25 PROCEDURE — 83605 ASSAY OF LACTIC ACID: CPT

## 2021-07-25 PROCEDURE — 87040 BLOOD CULTURE FOR BACTERIA: CPT

## 2021-07-25 PROCEDURE — 99239 HOSP IP/OBS DSCHRG MGMT >30: CPT

## 2021-07-25 PROCEDURE — 80048 BASIC METABOLIC PNL TOTAL CA: CPT

## 2021-07-25 RX ORDER — GABAPENTIN 400 MG/1
1 CAPSULE ORAL
Qty: 0 | Refills: 0 | DISCHARGE

## 2021-07-25 RX ORDER — CEPHALEXIN 500 MG
1 CAPSULE ORAL
Qty: 56 | Refills: 0
Start: 2021-07-25 | End: 2021-08-07

## 2021-07-25 RX ORDER — DIVALPROEX SODIUM 500 MG/1
1 TABLET, DELAYED RELEASE ORAL
Qty: 90 | Refills: 0
Start: 2021-07-25 | End: 2021-08-23

## 2021-07-25 RX ORDER — ACETAMINOPHEN 500 MG
2 TABLET ORAL
Qty: 240 | Refills: 0
Start: 2021-07-25 | End: 2021-08-23

## 2021-07-25 RX ORDER — IBUPROFEN 200 MG
1 TABLET ORAL
Qty: 0 | Refills: 0 | DISCHARGE

## 2021-07-25 RX ORDER — DIVALPROEX SODIUM 500 MG/1
1 TABLET, DELAYED RELEASE ORAL
Qty: 0 | Refills: 0 | DISCHARGE

## 2021-07-25 RX ORDER — DOXAZOSIN MESYLATE 4 MG
1 TABLET ORAL
Qty: 30 | Refills: 0
Start: 2021-07-25 | End: 2021-08-23

## 2021-07-25 RX ORDER — FAMOTIDINE 10 MG/ML
1 INJECTION INTRAVENOUS
Qty: 30 | Refills: 0
Start: 2021-07-25 | End: 2021-08-23

## 2021-07-25 RX ORDER — ALLOPURINOL 300 MG
1 TABLET ORAL
Qty: 30 | Refills: 0
Start: 2021-07-25 | End: 2021-08-23

## 2021-07-25 RX ORDER — METOPROLOL TARTRATE 50 MG
1 TABLET ORAL
Qty: 60 | Refills: 0
Start: 2021-07-25 | End: 2021-08-23

## 2021-07-25 RX ORDER — LISINOPRIL 2.5 MG/1
1 TABLET ORAL
Qty: 0 | Refills: 0 | DISCHARGE

## 2021-07-25 RX ORDER — CEPHALEXIN 500 MG
500 CAPSULE ORAL EVERY 12 HOURS
Refills: 0 | Status: DISCONTINUED | OUTPATIENT
Start: 2021-07-25 | End: 2021-07-25

## 2021-07-25 RX ORDER — SIMVASTATIN 20 MG/1
1 TABLET, FILM COATED ORAL
Qty: 0 | Refills: 0 | DISCHARGE

## 2021-07-25 RX ORDER — LISINOPRIL 2.5 MG/1
1 TABLET ORAL
Qty: 30 | Refills: 0
Start: 2021-07-25 | End: 2021-08-23

## 2021-07-25 RX ORDER — SIMVASTATIN 20 MG/1
1 TABLET, FILM COATED ORAL
Qty: 30 | Refills: 0
Start: 2021-07-25 | End: 2021-08-23

## 2021-07-25 RX ORDER — DOXAZOSIN MESYLATE 4 MG
1 TABLET ORAL
Qty: 0 | Refills: 0 | DISCHARGE

## 2021-07-25 RX ORDER — FAMOTIDINE 10 MG/ML
1 INJECTION INTRAVENOUS
Qty: 0 | Refills: 0 | DISCHARGE

## 2021-07-25 RX ORDER — CEPHALEXIN 500 MG
500 CAPSULE ORAL EVERY 6 HOURS
Refills: 0 | Status: DISCONTINUED | OUTPATIENT
Start: 2021-07-25 | End: 2021-07-25

## 2021-07-25 RX ORDER — GABAPENTIN 400 MG/1
1 CAPSULE ORAL
Qty: 90 | Refills: 0
Start: 2021-07-25 | End: 2021-08-23

## 2021-07-25 RX ORDER — APIXABAN 2.5 MG/1
1 TABLET, FILM COATED ORAL
Qty: 60 | Refills: 0
Start: 2021-07-25 | End: 2021-08-23

## 2021-07-25 RX ADMIN — APIXABAN 5 MILLIGRAM(S): 2.5 TABLET, FILM COATED ORAL at 06:16

## 2021-07-25 RX ADMIN — GABAPENTIN 600 MILLIGRAM(S): 400 CAPSULE ORAL at 06:16

## 2021-07-25 RX ADMIN — LISINOPRIL 10 MILLIGRAM(S): 2.5 TABLET ORAL at 06:18

## 2021-07-25 RX ADMIN — Medication 500 MILLIGRAM(S): at 11:21

## 2021-07-25 RX ADMIN — DIVALPROEX SODIUM 500 MILLIGRAM(S): 500 TABLET, DELAYED RELEASE ORAL at 06:16

## 2021-07-25 RX ADMIN — Medication 25 MILLIGRAM(S): at 06:18

## 2021-07-25 RX ADMIN — Medication 300 MILLIGRAM(S): at 11:16

## 2021-07-25 RX ADMIN — FAMOTIDINE 20 MILLIGRAM(S): 10 INJECTION INTRAVENOUS at 11:16

## 2021-07-25 RX ADMIN — Medication 2000 MILLIGRAM(S): at 06:14

## 2021-07-25 NOTE — DISCHARGE NOTE PROVIDER - CARE PROVIDER_API CALL
Nieves Schumacher)  Surgery; Vascular Surgery  [address] [place] [address]  Phone: [phone]  Fax: [phone]  Follow Up Time:    Nieves Schumacher)  Surgery; Vascular Surgery  [address] [place] [address]  Phone: [phone]  Fax: [phone]  Follow Up Time: 1 week

## 2021-07-25 NOTE — DISCHARGE NOTE NURSING/CASE MANAGEMENT/SOCIAL WORK - PATIENT PORTAL LINK FT
You can access the FollowMyHealth Patient Portal offered by [facility] by registering at the following website: [email or web address]. By joining [employer]’s FollowMyHealth portal, you will also be able to view your health information using other applications (apps) compatible with our system.

## 2021-07-25 NOTE — DISCHARGE NOTE PROVIDER - NSDCFUADDINST_GEN_ALL_CORE_FT
FOLLOW UP: Dr. Schumacher in 1 week. Your appointment has been made for _______. Call the office at  with any questions...    WOUND CARE: You may shower; soap and water over legs, wrap with kerlix and ace bandages    ACTIVITY:  Ambulate as tolerated, but no heavy lifting (>10lbs) or strenuous exercise....    DIET:   You may resume regular diet. Call the office if you experience increasing pain, redness, swelling or drainage from incision sites/wounds, or temperature >101.4F.     NEW MEDICATIONS:    DISCHARGE DESTINATION: Home FOLLOW UP: Dr. Schumacher in 1 week. The office will call with an appointment.  Please call the office if you do not hear from them by end of Monday. Call the office at  with any questions.    WOUND CARE: You may shower; soap and water over legs, wrap with kerlix and ace bandages    ACTIVITY:  Ambulate as tolerated, but no heavy lifting (>10lbs) or strenuous exercise.    DIET:   You may resume regular diet. Call the office if you experience increasing pain, redness, swelling or drainage from incision sites/wounds, or temperature >101.4F.    NEW MEDICATIONS: Kelfex    DISCHARGE DESTINATION: Home

## 2021-07-25 NOTE — PROGRESS NOTE ADULT - SUBJECTIVE AND OBJECTIVE BOX
Subjective: Patient seen sitting up in bed complaining of pain in legs and feet.  O/N: MARCI VSS  7/24: Venous duplex negative for DVT. Discharge       ROS:   Denies Headache, blurred vision, Chest Pain, SOB, Abdominal pain, nausea or vomiting   cephalexin 500  apixaban 5  cephalexin 500  doxazosin 2  hydrochlorothiazide 25  lisinopril 10  metoprolol tartrate 25      Allergies    Haldol (Anaphylaxis)  Zyprexa (Anaphylaxis)    Intolerances        Vital Signs Last 24 Hrs  T(C): 36.2 (25 Jul 2021 05:01), Max: 36.3 (24 Jul 2021 22:13)  T(F): 97.1 (25 Jul 2021 05:01), Max: 97.4 (24 Jul 2021 22:13)  HR: 67 (25 Jul 2021 05:47) (57 - 67)  BP: 127/74 (25 Jul 2021 05:47) (122/60 - 151/87)  BP(mean): --  RR: 18 (25 Jul 2021 05:47) (18 - 18)  SpO2: 94% (25 Jul 2021 05:47) (94% - 96%)  I&O's Summary    24 Jul 2021 07:01  -  25 Jul 2021 07:00  --------------------------------------------------------  IN: 360 mL / OUT: 0 mL / NET: 360 mL      Physical Exam:  Extremities: RLE with open ulcers present on the anterior shin surrounded by mild erythema. Mild discharge that is serous. LLE with chronic venous dermatitis color changes and with small ulceration present on posterior calf region with mild serous discharge.  Vascular: Palpable DP bilaterally. Triphasic PT and popliteal bilaterally. Palpable femoral pulses bilaterally.      LABS:                        15.1   8.95  )-----------( 203      ( 24 Jul 2021 06:55 )             47.4     07-24    137  |  102  |  14  ----------------------------<  116<H>  4.2   |  23  |  1.02    Ca    9.2      24 Jul 2021 06:55  Phos  3.2     07-24  Mg     2.2     07-24    TPro  6.7  /  Alb  3.9  /  TBili  0.3  /  DBili  x   /  AST  13  /  ALT  13  /  AlkPhos  58  07-23    PT/INR - ( 23 Jul 2021 15:49 )   PT: 12.7 sec;   INR: 1.06          PTT - ( 23 Jul 2021 15:49 )  PTT:32.7 sec    Radiology and Additional Studies:
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
## 2021-07-25 NOTE — DISCHARGE NOTE PROVIDER - NSDCMRMEDTOKEN_GEN_ALL_CORE_FT
apixaban 5 mg oral tablet: 1 tab(s) orally every 12 hours  cephalexin 500 mg oral tablet: 1 tab(s) orally 4 times a day   divalproex sodium 500 mg oral delayed release tablet: 1 tab(s) orally 3 times a day  doxazosin 2 mg oral tablet: 1 tab(s) orally once a day  gabapentin 400 mg oral capsule: 1 cap(s) orally 3 times a day  hydroCHLOROthiazide 25 mg oral tablet: 1 tab(s) orally once a day  lisinopril 10 mg oral tablet: 1 tab(s) orally once a day  metoprolol tartrate 25 mg oral tablet: 1 tab(s) orally every 12 hours  Motrin 600 mg oral tablet: 1 tab(s) orally every 6 hours  Pepcid 40 mg oral tablet: 1 tab(s) orally once a day (at bedtime)  simvastatin 20 mg oral tablet: 1 tab(s) orally once a day (at bedtime)   acetaminophen 325 mg oral tablet: 2 tab(s) orally every 6 hours, As needed, Moderate Pain (4 - 6)  allopurinol 300 mg oral tablet: 1 tab(s) orally once a day  apixaban 5 mg oral tablet: 1 tab(s) orally 2 times a day  cephalexin 500 mg oral capsule: 1 cap(s) orally every 6 hours  divalproex sodium 500 mg oral delayed release tablet: 1 tab(s) orally every 8 hours  doxazosin 2 mg oral tablet: 1 tab(s) orally once a day (at bedtime)  famotidine 20 mg oral tablet: 1 tab(s) orally once a day  gabapentin 400 mg oral capsule: 1 cap(s) orally 3 times a day   hydroCHLOROthiazide 25 mg oral tablet: 1 tab(s) orally once a day  lisinopril 10 mg oral tablet: 1 tab(s) orally once a day  metoprolol tartrate 25 mg oral tablet: 1 tab(s) orally 2 times a day  simvastatin 10 mg oral tablet: 1 tab(s) orally once a day (at bedtime)

## 2021-07-25 NOTE — DISCHARGE NOTE PROVIDER - NSDCCPCAREPLAN_GEN_ALL_CORE_FT
PRINCIPAL DISCHARGE DIAGNOSIS  Diagnosis: Cellulitis of leg without foot  Assessment and Plan of Treatment:       SECONDARY DISCHARGE DIAGNOSES  Diagnosis: HTN (hypertension)  Assessment and Plan of Treatment:     Diagnosis: HLD (hyperlipidemia)  Assessment and Plan of Treatment:     Diagnosis: BPH (benign prostatic hyperplasia)  Assessment and Plan of Treatment:     Diagnosis: Atrial flutter  Assessment and Plan of Treatment:     Diagnosis: Avascular necrosis of femur  Assessment and Plan of Treatment:     Diagnosis: Gout  Assessment and Plan of Treatment:

## 2021-07-28 NOTE — ADDENDUM
[FreeTextEntry1] : I, Dr. Nieves Schumacher, personally performed the evaluation and management (E/M) services for this established patient who presents today with (a) new problem(s)/exacerbation of (an) existing condition(s).  That E/M includes conducting the examination, assessing all new/exacerbated conditions, and establishing a new plan of care.  Today, my ACP, Kathryn BRAUN, was here to observe my evaluation and management services for this new problem/exacerbated condition to be followed going forward.\par \par \par
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
## 2021-07-28 NOTE — PHYSICAL EXAM
[Alert] : alert [Oriented to Person] : oriented to person [Oriented to Place] : oriented to place [Oriented to Time] : oriented to time [Calm] : calm [2+] : left 2+ [Ankle Swelling (On Exam)] : present [Ankle Swelling Bilaterally] : bilaterally  [Ankle Swelling On The Left] : moderate [de-identified] : NAD, looking tired [de-identified] : NCAT [de-identified] : FROM [de-identified] : B/L LE: + erythema from knees down, + ruptured blisters, worsened edema

## 2021-07-28 NOTE — ASSESSMENT
[Arterial/Venous Disease] : arterial/venous disease [Ulcer Care] : ulcer care [FreeTextEntry1] : 65 year old male with recurrent BLE cellulitis returns for a follow up with increased LE skin erythema and worsened edema. Patient appears to be very tired. I am concerned regarding recurrent cellulitis. Patient was recommended to be admitted to [facility] for IV antibiotics. He agrees since he doesn't see improvement of his LE condition.\par \par Plan\par -Admit to [facility] for IV abx

## 2021-07-28 NOTE — REVIEW OF SYSTEMS
[Negative] : Heme/Lymph [As noted in HPI] : as noted in HPI [Lower Ext Edema] : lower extremity edema [Limb Pain] : limb pain [As Noted in HPI] : as noted in HPI [Limb Swelling] : limb swelling [Fever] : no fever [Chills] : no chills [Leg Claudication] : no intermittent leg claudication

## 2021-07-28 NOTE — END OF VISIT
[FreeTextEntry3] : This note was written by Cookie Mart on 07/23/2021 acting as scribe for Dr. Love.

## 2021-07-30 DIAGNOSIS — R25.3 FASCICULATION: ICD-10-CM

## 2021-07-30 DIAGNOSIS — F32.9 MAJOR DEPRESSIVE DISORDER, SINGLE EPISODE, UNSPECIFIED: ICD-10-CM

## 2021-07-30 DIAGNOSIS — M21.372 FOOT DROP, LEFT FOOT: ICD-10-CM

## 2021-07-30 DIAGNOSIS — L03.116 CELLULITIS OF LEFT LOWER LIMB: ICD-10-CM

## 2021-07-30 DIAGNOSIS — T24.331S: ICD-10-CM

## 2021-07-30 DIAGNOSIS — L03.115 CELLULITIS OF RIGHT LOWER LIMB: ICD-10-CM

## 2021-07-30 DIAGNOSIS — E78.5 HYPERLIPIDEMIA, UNSPECIFIED: ICD-10-CM

## 2021-07-30 DIAGNOSIS — N40.0 BENIGN PROSTATIC HYPERPLASIA WITHOUT LOWER URINARY TRACT SYMPTOMS: ICD-10-CM

## 2021-07-30 DIAGNOSIS — Z88.8 ALLERGY STATUS TO OTHER DRUGS, MEDICAMENTS AND BIOLOGICAL SUBSTANCES STATUS: ICD-10-CM

## 2021-07-30 DIAGNOSIS — K21.9 GASTRO-ESOPHAGEAL REFLUX DISEASE WITHOUT ESOPHAGITIS: ICD-10-CM
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
## 2021-07-30 DIAGNOSIS — Z94.84 STEM CELLS TRANSPLANT STATUS: ICD-10-CM

## 2021-07-30 DIAGNOSIS — I10 ESSENTIAL (PRIMARY) HYPERTENSION: ICD-10-CM
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
## 2021-07-30 DIAGNOSIS — I48.92 UNSPECIFIED ATRIAL FLUTTER: ICD-10-CM

## 2021-07-30 DIAGNOSIS — Z94.6 BONE TRANSPLANT STATUS: ICD-10-CM

## 2021-07-30 DIAGNOSIS — Z79.01 LONG TERM (CURRENT) USE OF ANTICOAGULANTS: ICD-10-CM

## 2021-07-30 DIAGNOSIS — T24.332S: ICD-10-CM

## 2021-07-30 DIAGNOSIS — M10.9 GOUT, UNSPECIFIED: ICD-10-CM

## 2021-08-02 ENCOUNTER — APPOINTMENT (OUTPATIENT)
Dept: VASCULAR SURGERY | Facility: CLINIC | Age: 66
End: 2021-08-02
Payer: MEDICARE

## 2021-08-02 PROCEDURE — 99212 OFFICE O/P EST SF 10 MIN: CPT

## 2021-08-04 NOTE — PHYSICAL EXAM
[Respiratory Effort] : normal respiratory effort [Normal Rate and Rhythm] : normal rate and rhythm [Ankle Swelling (On Exam)] : present [Ankle Swelling Bilaterally] : bilaterally  [Ankle Swelling On The Left] : moderate [Alert] : alert [Oriented to Person] : oriented to person [Oriented to Place] : oriented to place [Oriented to Time] : oriented to time [Calm] : calm [2+] : left 2+ [Varicose Veins Of Lower Extremities] : not present [] : not present [Abdomen Tenderness] : ~T ~M No abdominal tenderness [de-identified] : NAD, looks fatigued [de-identified] : NC/AT  [de-identified] : Supple  [de-identified] : Overweight [de-identified] : FROM [de-identified] : BLE edema from below the knees to the feet. small open sores to the anterior/lateral right leg. No purulence, skin is irritated. Toes are warm to touch with adequate capillary refill.  [de-identified] : Grossly intact.
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
## 2021-08-04 NOTE — PROCEDURE
[FreeTextEntry1] :  RLE: Bacitracin applied and wrapped w/kerlix and ACE bandage. LLE was wrapped w/ACE bandage.

## 2021-08-04 NOTE — ADDENDUM
[FreeTextEntry1] : This note was written by Bella Hewitt on 08/02/2021 acting as scribe for Nieves Shrestha M.D.\par \par I, Dr. Nieves Schumacher, personally performed the evaluation and management (E/M) services for this established patient who presents today with (an) existing condition(s).  That E/M includes conducting the examination, assessing all conditions, and (re)establishing/reinforcing a plan of care.  Today, my ACP, Kathryn BRAUN, was here to observe my evaluation and management services for this condition to be followed going forward.\par \par \par \par \par
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
## 2021-08-09 ENCOUNTER — APPOINTMENT (OUTPATIENT)
Dept: VASCULAR SURGERY | Facility: CLINIC | Age: 66
End: 2021-08-09
Payer: MEDICARE

## 2021-08-09 VITALS
BODY MASS INDEX: 34.36 KG/M2 | WEIGHT: 240 LBS | HEIGHT: 70 IN | SYSTOLIC BLOOD PRESSURE: 118 MMHG | DIASTOLIC BLOOD PRESSURE: 72 MMHG | HEART RATE: 68 BPM

## 2021-08-09 PROCEDURE — 99213 OFFICE O/P EST LOW 20 MIN: CPT | Mod: 25

## 2021-08-09 PROCEDURE — 29580 STRAPPING UNNA BOOT: CPT | Mod: 50

## 2021-08-09 NOTE — PHYSICAL EXAM
[Respiratory Effort] : normal respiratory effort [Normal Rate and Rhythm] : normal rate and rhythm [2+] : left 2+ [Ankle Swelling (On Exam)] : present [Ankle Swelling Bilaterally] : bilaterally  [Ankle Swelling On The Left] : moderate [Alert] : alert [Oriented to Person] : oriented to person [Oriented to Place] : oriented to place [Oriented to Time] : oriented to time [Calm] : calm [Varicose Veins Of Lower Extremities] : not present [] : not present [Abdomen Tenderness] : ~T ~M No abdominal tenderness [Purpura] : no purpura  [Petechiae] : no petechiae [Skin Ulcer] : ulcer [Skin Induration] : no induration [de-identified] : NAD, looks fatigued [de-identified] : NC/AT  [de-identified] : Supple  [de-identified] : Overweight [de-identified] : Limited AROM throughout LEs b/l, strength 5/5x4 [de-identified] : BLE edema from below the knees to the feet w/weeping ulcers of the calves, +erythema, tender to touch [de-identified] : Grossly intact.

## 2021-08-09 NOTE — REASON FOR VISIT
[Post Hospitalization] : a post hospitalization visit [FreeTextEntry1] : Admitted for b/l LE cellulitis and stasis ulcers, now w/open, weeping wounds and pain

## 2021-08-09 NOTE — HISTORY OF PRESENT ILLNESS
[FreeTextEntry1] : 66yoM w/h/o HTN, HLD, BPH, avascular necrosis of femoral heads, returns after recent hospitalization for b/l LE cellulitis, treated w/IV abx and d/c'd home w/Keflex.  Pt states that his pain has worsened since discharge, and his legs are weeping and are swollen.  He is non-compliant w/compression or elevation, and was told by Dr. Schumacher to come in if symptoms returned.

## 2021-08-09 NOTE — ASSESSMENT
[Arterial/Venous Disease] : arterial/venous disease [Medication Management] : medication management [Foot care/Footwear] : foot care/footwear [Ulcer Care] : ulcer care [FreeTextEntry1] : 66yoM w/h/o HTN, HLD, BPH, avascular necrosis of femoral heads, returns after recent hospitalization for b/l LE cellulitis, treated w/IV abx and d/c'd home w/Keflex.  Pt states that his pain has worsened since discharge, and his legs are weeping and are swollen.  He is non-compliant w/compression or elevation, and was told by Dr. Schumacher to come in if symptoms returned.\par \par No cultures available from hospital admission, will start pt on doxycycline BID and both legs wrapped in unna today.  Pt will RTO in 1wk to f/u w/Dr. Schumacher.

## 2021-08-16 ENCOUNTER — APPOINTMENT (OUTPATIENT)
Dept: VASCULAR SURGERY | Facility: CLINIC | Age: 66
End: 2021-08-16
Payer: MEDICARE

## 2021-08-16 VITALS
HEIGHT: 70 IN | DIASTOLIC BLOOD PRESSURE: 82 MMHG | SYSTOLIC BLOOD PRESSURE: 133 MMHG | BODY MASS INDEX: 34.36 KG/M2 | WEIGHT: 240 LBS | HEART RATE: 58 BPM

## 2021-08-16 PROCEDURE — 99213 OFFICE O/P EST LOW 20 MIN: CPT

## 2021-08-17 NOTE — ASSESSMENT
[Arterial/Venous Disease] : arterial/venous disease [Medication Management] : medication management [Foot care/Footwear] : foot care/footwear [Ulcer Care] : ulcer care [FreeTextEntry1] : 67 y/o M w/h/o HTN, HLD, BPH, avascular necrosis of femoral heads, returns after recent hospitalization for b/l LE cellulitis, treated w/IV abx and d/c'd home w/Keflex He completed week course of Doxycycline and tolerated unna boot wrappings well. He is here for one week wound check and new unna boot placing. On exam, ulcers in several stages of healing w/mixed dry scabs. Small blister to the proximal R lower leg, slight erythema continues to be present to both calves but improved from previous visit. RX for doxycycline BID provided and both legs wrapped in unna today.  Pt will RTO in 1wk to f/u w/Dr. Schumacher.

## 2021-08-17 NOTE — HISTORY OF PRESENT ILLNESS
[FreeTextEntry1] : 67 y/o M w/h/o HTN, HLD, BPH, avascular necrosis of femoral heads, returns after recent hospitalization for b/l LE cellulitis, treated w/IV abx and d/c'd home w/Keflex.He has completed a week course of doxycycline with improvement of his BLE cellulitis and swelling. He tolerated unna boot wrappings well throughout last week. He removed unna boots showered and is here for new ones to be placed. No reports of rest pain, drainage from the legs, fever or chills.

## 2021-08-17 NOTE — ADDENDUM
[FreeTextEntry1] : This note was written by Bella Hewitt on 08/16/2021 acting as scribe for Nieves Shrestha M.D.\par \par I, Dr. Nieves Schumacher, personally performed the evaluation and management (E/M) services for this established patient who presents today with (an) existing condition(s).  That E/M includes conducting the examination, assessing all conditions, and (re)establishing/reinforcing a plan of care.  Today, my ACP, Kathryn BRAUN, was here to observe my evaluation and management services for this condition to be followed going forward.\par \par \par \par \par

## 2021-08-17 NOTE — PHYSICAL EXAM
[Respiratory Effort] : normal respiratory effort [Normal Rate and Rhythm] : normal rate and rhythm [2+] : left 2+ [Ankle Swelling (On Exam)] : present [Ankle Swelling Bilaterally] : bilaterally  [Ankle Swelling On The Right] : mild [Skin Ulcer] : ulcer [Alert] : alert [Oriented to Person] : oriented to person [Oriented to Place] : oriented to place [Oriented to Time] : oriented to time [Calm] : calm [Varicose Veins Of Lower Extremities] : not present [] : not present [Abdomen Tenderness] : ~T ~M No abdominal tenderness [Purpura] : no purpura  [Petechiae] : no petechiae [Skin Induration] : no induration [de-identified] : Friendly, cooperative, NAD [de-identified] : NC/AT  [de-identified] : Supple  [de-identified] : Limited AROM throughout LEs b/l, strength 5/5x4 [de-identified] : Overweight [de-identified] : BLE edema from below the knees to the feet ulcers in several stages of healing mixed/dry scabs over the calves, small blister to the proximal R leg, +erythema w/improvement. No purulence.  [de-identified] : Grossly intact.

## 2021-08-22 ENCOUNTER — RESULT CHARGE (OUTPATIENT)
Age: 66
End: 2021-08-22

## 2021-08-23 ENCOUNTER — APPOINTMENT (OUTPATIENT)
Dept: VASCULAR SURGERY | Facility: CLINIC | Age: 66
End: 2021-08-23
Payer: MEDICARE

## 2021-08-23 PROCEDURE — 99212 OFFICE O/P EST SF 10 MIN: CPT | Mod: 25

## 2021-08-23 PROCEDURE — 29580 STRAPPING UNNA BOOT: CPT | Mod: 50

## 2021-08-26 NOTE — HISTORY OF PRESENT ILLNESS
[FreeTextEntry1] : 67 y/o M w/h/o HTN, HLD, BPH, avascular necrosis of femoral heads, returns after recent hospitalization for b/l LE cellulitis, treated w/IV abx and d/c'd home w/Keflex.He has been compliant w/doxycycline with improvement of his BLE cellulitis and swelling. He tolerated unna boot wrappings well for most part of the week. He does admit he develops progressive swelling of the legs and states his legs feel tight with unna boot wrappings feeling the need to remove them ~ 3-4th day. He notes significant improvement of the LE redness but wound continue to weep. He has removed unna boots showered and is here for new ones to be placed. No reports of rest pain, fever or chills.

## 2021-08-26 NOTE — ASSESSMENT
[Arterial/Venous Disease] : arterial/venous disease [Medication Management] : medication management [FreeTextEntry1] : 67 y/o M w/h/o HTN, HLD, BPH, avascular necrosis of femoral heads, returns after recent hospitalization for b/l LE cellulitis, treated w/IV abx and d/c'd home w/Keflex.RX for Doxycycline x 5 days with improvement of LE cellulitis. On exam, BLE edema from below the knees to the feet ulcers in several stages of healing mixed/dry scabs over the calves, small blister to the proximal R leg, +erythema w/improvement. Wounds are weeping but no purulence and no signs of acute infection. Bilateral unna boots strapped today. Pt will RTO in 1 week for LE cellulitis/new unna boot placement. Encouraged pt he elevate legs when swelling is present and will allow him to tolerate unna boots throughout the week to help in wound healing.

## 2021-08-26 NOTE — PHYSICAL EXAM
[Respiratory Effort] : normal respiratory effort [Normal Rate and Rhythm] : normal rate and rhythm [2+] : left 2+ [Ankle Swelling (On Exam)] : present [Ankle Swelling Bilaterally] : bilaterally  [Ankle Swelling On The Right] : mild [Skin Ulcer] : ulcer [Alert] : alert [Oriented to Person] : oriented to person [Oriented to Place] : oriented to place [Oriented to Time] : oriented to time [Calm] : calm [Varicose Veins Of Lower Extremities] : not present [] : not present [Abdomen Tenderness] : ~T ~M No abdominal tenderness [Purpura] : no purpura  [Petechiae] : no petechiae [Skin Induration] : no induration [de-identified] : Friendly, cooperative, NAD [de-identified] : NC/AT  [de-identified] : Supple  [de-identified] : Overweight [de-identified] : Limited AROM throughout LEs b/l, strength 5/5x4 [de-identified] : BLE edema from below the knees to the feet ulcers in several stages of healing mixed/dry scabs over the calves, small blister to the proximal R leg, +erythema w/improvement. Wounds are weeping but no purulence and no signs of acute infection.  [de-identified] : Grossly intact.

## 2021-08-26 NOTE — ADDENDUM
[FreeTextEntry1] : This note was written by Bella Hewitt on 08/23/2021 acting as scribe for Nieves Shrestha M.D.\par \par

## 2021-08-30 ENCOUNTER — APPOINTMENT (OUTPATIENT)
Dept: VASCULAR SURGERY | Facility: CLINIC | Age: 66
End: 2021-08-30
Payer: MEDICARE

## 2021-08-30 PROCEDURE — 99212 OFFICE O/P EST SF 10 MIN: CPT | Mod: 25

## 2021-08-30 PROCEDURE — 29580 STRAPPING UNNA BOOT: CPT | Mod: 50

## 2021-09-01 NOTE — ASSESSMENT
[Arterial/Venous Disease] : arterial/venous disease [Medication Management] : medication management [Foot care/Footwear] : foot care/footwear [Ulcer Care] : ulcer care [FreeTextEntry1] : 65 y/o M w/h/o HTN, HLD, BPH, avascular necrosis of femoral heads, returns after recent hospitalization for b/l LE cellulitis, treated w/IV abx and d/c'd home w/Keflex.RX for Doxycycline x 5 days with improvement of LE cellulitis. \par \par On exam, BLE mild swelling from below the knees to the feet, superficial wounds now w/scabs over them, no weeping, no purulence and no signs of acute infection. Bilateral unna boots strapped today. Encouraged pt he elevate legs when swelling is present and will allow him to tolerate unna boots throughout the week to help in wound healing. Pt may continue his daily wrappings on completing 1 week with unna boot wrappings. No clinical indications for any vascular surgery interventions at this time.  He may continue to see me as needed.

## 2021-09-01 NOTE — PHYSICAL EXAM
[Respiratory Effort] : normal respiratory effort [Normal Rate and Rhythm] : normal rate and rhythm [2+] : left 2+ [Ankle Swelling (On Exam)] : present [Ankle Swelling Bilaterally] : bilaterally  [Ankle Swelling On The Right] : mild [Skin Ulcer] : ulcer [Alert] : alert [Oriented to Person] : oriented to person [Oriented to Place] : oriented to place [Oriented to Time] : oriented to time [Calm] : calm [Varicose Veins Of Lower Extremities] : not present [] : not present [Abdomen Tenderness] : ~T ~M No abdominal tenderness [Purpura] : no purpura  [Petechiae] : no petechiae [Skin Induration] : no induration [de-identified] : Friendly, cooperative, NAD [de-identified] : NC/AT  [de-identified] : Supple  [de-identified] : Overweight [de-identified] : Limited AROM throughout LEs b/l, strength 5/5x4 [de-identified] : BLE edema from below the knees to the feet ulcers now healed with dry scabs over the calves, dry scabs or previous blister noted over the R leg, +erythema w/improvement. No purulence, no weeping of the wounds and no signs of acute infection.  [de-identified] : Grossly intact.

## 2021-09-01 NOTE — HISTORY OF PRESENT ILLNESS
[FreeTextEntry1] : 67 y/o M w/h/o HTN, HLD, BPH, avascular necrosis of femoral heads, returns after recent hospitalization for b/l LE cellulitis, treated w/IV abx and d/c'd home w/Keflex.He has been compliant w/doxycycline with improvement of his BLE cellulitis and swelling. He tolerated unna boot wrappings well for most part of the week. He does admit he develops progressive swelling of the legs and states his legs feel tight with unna boot wrappings feeling the need to remove them ~ 2-3rd day. He has opted to complete his own wrappings with Zinc Oxide and compression with ace bandages with improvement of his skin irritation.He would like new unna boots to be placed today without calamine.  No reports of rest pain, fever or chills.

## 2021-09-01 NOTE — ADDENDUM
[FreeTextEntry1] : This note was written by Bella Hewitt on 08/30/2021 acting as scribe for Nieves Shrestha M.D.\par \par

## 2021-09-03 NOTE — ED ADULT NURSE NOTE - EXTENSIONS OF SELF_ADULT
Reminder received from 3/17/2020 MRI:  Please notify Mr. 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  This stability over almost 18 months confirms its benign nature.  There are no new lesions in the pancreas, and his pancreas otherwise looks healthy.  Additionally, there are no other meaningful abnormalities in his abdomen, including a normal-appearing liver, gallbladder, biliary tree, and bowel.     1)  I recommend a repeat MRI in 18 months to confirm stability of the pancreatic cyst -- file tickler.        Ok to order MRI abdomen W WO?   None

## 2021-10-14 ENCOUNTER — APPOINTMENT (OUTPATIENT)
Dept: HEART AND VASCULAR | Facility: CLINIC | Age: 66
End: 2021-10-14
Payer: MEDICARE

## 2021-10-14 ENCOUNTER — NON-APPOINTMENT (OUTPATIENT)
Age: 66
End: 2021-10-14

## 2021-10-14 VITALS
SYSTOLIC BLOOD PRESSURE: 122 MMHG | BODY MASS INDEX: 36.51 KG/M2 | DIASTOLIC BLOOD PRESSURE: 80 MMHG | WEIGHT: 255 LBS | HEIGHT: 70 IN | HEART RATE: 57 BPM | TEMPERATURE: 97.5 F

## 2021-10-14 PROCEDURE — 93000 ELECTROCARDIOGRAM COMPLETE: CPT

## 2021-10-14 PROCEDURE — 99214 OFFICE O/P EST MOD 30 MIN: CPT

## 2021-10-14 RX ORDER — DIVALPROEX SODIUM 500 MG/1
500 TABLET, DELAYED RELEASE ORAL
Refills: 0 | Status: DISCONTINUED | COMMUNITY
End: 2021-10-14

## 2021-10-14 RX ORDER — APIXABAN 5 MG/1
5 TABLET, FILM COATED ORAL
Refills: 0 | Status: ACTIVE | COMMUNITY
Start: 2021-10-14

## 2021-10-14 RX ORDER — MELOXICAM 7.5 MG/1
7.5 TABLET ORAL DAILY
Qty: 60 | Refills: 0 | Status: DISCONTINUED | COMMUNITY
Start: 2020-08-20 | End: 2021-10-14

## 2021-10-14 RX ORDER — MELOXICAM 7.5 MG/1
7.5 TABLET ORAL DAILY
Qty: 60 | Refills: 0 | Status: DISCONTINUED | COMMUNITY
Start: 2020-09-04 | End: 2021-10-14

## 2021-10-14 RX ORDER — AMOXICILLIN AND CLAVULANATE POTASSIUM 875; 125 MG/1; MG/1
875-125 TABLET, COATED ORAL
Qty: 14 | Refills: 0 | Status: DISCONTINUED | COMMUNITY
Start: 2020-07-20 | End: 2021-10-14

## 2021-10-19 NOTE — PHYSICAL EXAM
[Well Developed] : well developed [Well Nourished] : well nourished [No Acute Distress] : no acute distress [Normal S1, S2] : normal S1, S2 [No Murmur] : no murmur [Clear Lung Fields] : clear lung fields [Good Air Entry] : good air entry [No Respiratory Distress] : no respiratory distress  [Soft] : abdomen soft [Non Tender] : non-tender [Abnormal Gait] : abnormal gait [Normal] : alert and oriented, normal memory

## 2021-10-25 NOTE — REVIEW OF SYSTEMS
[Syncope] : syncope [Negative] : Heme/Lymph [Fever] : no fever [Chills] : no chills [Feeling Fatigued] : not feeling fatigued [SOB] : no shortness of breath [Dyspnea on exertion] : not dyspnea during exertion [Palpitations] : no palpitations

## 2021-10-25 NOTE — HISTORY OF PRESENT ILLNESS
[FreeTextEntry1] : 66 year old male with HTN, HLD, depression and atrial flutter s/p ablation (typical) 3/2021, who presents for follow up.\par \par Since discharge from the hospital he has been doing well.  He is maintained on oral anticoagulation.  No palpitations, chest pain, SOB, near syncope.  He has stable LE edema.  HE states that from his last appointment in april to july he had multiple episodes of loosing consciousness with no prodrome.  No precipitating factors.  He did not hurt himself.  No known atrial fibrillation.  \par \par  \par

## 2021-10-25 NOTE — DISCUSSION/SUMMARY
[FreeTextEntry1] : 66 year old male with HTN, HLD, depression and atrial flutter s/p ablation (typical) 3/2021, who presents for follow up.  History of typical atrial flutter and a rapid ventricular response. He underwent ablation of the cavotricuspid isthmus without complication and evidence of bidirectional electrical block in the isthmus. He has had no symptoms concerning recurrent for recurrent atrial flutter.  WE discussed the association between atrial flutter and atrial fibrillation.  No known documented atrial fibrillation.  He notes multiple episodes of syncope without prodrome - though last episode 7/2021.  We discussed that there are multiple etiologies for syncope but one could be heart rates that are too slow or too fast.  I have offered him a loop recorder implant for surveillance. We discussed the procedure in detail including risks, benefits and alternatives.  He knows to call with any questions or concerns.

## 2021-11-03 LAB — SARS-COV-2 N GENE NPH QL NAA+PROBE: NOT DETECTED

## 2021-11-05 ENCOUNTER — OUTPATIENT (OUTPATIENT)
Dept: OUTPATIENT SERVICES | Facility: HOSPITAL | Age: 66
LOS: 1 days | Discharge: HOME | End: 2021-11-05
Payer: MEDICARE

## 2021-11-05 DIAGNOSIS — K46.9 UNSPECIFIED ABDOMINAL HERNIA WITHOUT OBSTRUCTION OR GANGRENE: Chronic | ICD-10-CM

## 2021-11-05 DIAGNOSIS — T14.8 OTHER INJURY OF UNSPECIFIED BODY REGION: Chronic | ICD-10-CM

## 2021-11-05 DIAGNOSIS — T24.309A BURN OF THIRD DEGREE OF UNSPECIFIED SITE OF UNSPECIFIED LOWER LIMB, EXCEPT ANKLE AND FOOT, INITIAL ENCOUNTER: Chronic | ICD-10-CM

## 2021-11-05 DIAGNOSIS — Z98.890 OTHER SPECIFIED POSTPROCEDURAL STATES: Chronic | ICD-10-CM

## 2021-11-05 DIAGNOSIS — Z41.9 ENCOUNTER FOR PROCEDURE FOR PURPOSES OTHER THAN REMEDYING HEALTH STATE, UNSPECIFIED: Chronic | ICD-10-CM

## 2021-11-05 PROCEDURE — 33285 INSJ SUBQ CAR RHYTHM MNTR: CPT

## 2021-11-05 PROCEDURE — C1764: CPT

## 2021-11-09 DIAGNOSIS — R55 SYNCOPE AND COLLAPSE: ICD-10-CM

## 2021-11-09 DIAGNOSIS — I48.92 UNSPECIFIED ATRIAL FLUTTER: ICD-10-CM

## 2021-11-10 ENCOUNTER — APPOINTMENT (OUTPATIENT)
Dept: VASCULAR SURGERY | Facility: CLINIC | Age: 66
End: 2021-11-10
Payer: MEDICARE

## 2021-11-10 DIAGNOSIS — L03.115 CELLULITIS OF RIGHT LOWER LIMB: ICD-10-CM

## 2021-11-10 PROCEDURE — 99213 OFFICE O/P EST LOW 20 MIN: CPT

## 2021-11-10 NOTE — ASSESSMENT
[FreeTextEntry1] : 65 y/o M multiple episodes of cellulitis, treated w/ abx returns with worsening edema, R>L, wiping ulcers and skin erythema.\par BLE moderate edema, R>L from below the knees to the feet, superficial ulcers that are weeping, mild erythema. Pt was recommended to c/w daily washing with soap/water, wrap with kerlix and ace bandages. Encouraged pt he elevate legs when swelling is present and will allow him to tolerate unna boots throughout the week to help in wound healing. He will be started on oral antibiotics and he will call us in few days to update us on his condition. [Arterial/Venous Disease] : arterial/venous disease [Medication Management] : medication management [Foot care/Footwear] : foot care/footwear [Ulcer Care] : ulcer care

## 2021-11-10 NOTE — HISTORY OF PRESENT ILLNESS
[FreeTextEntry1] : 65 y/o M w/h/o HTN, HLD, BPH, avascular necrosis of femoral heads,s/p b/l LE cellulitis in August, 2021 requiring hospitalization and IV abx returns today for an evaluation. He has been doing his own wrappings with Zinc Oxide and compression with ace bandages however he developed more swelling, especially of his right leg and he noticed multiple ulcerations that are weeping. He denies fever or chills. He recently had a loop recorder to monitor his arrhythmias.

## 2021-11-10 NOTE — PHYSICAL EXAM
[Respiratory Effort] : normal respiratory effort [Normal Rate and Rhythm] : normal rate and rhythm [2+] : left 2+ [Ankle Swelling (On Exam)] : present [Ankle Swelling Bilaterally] : bilaterally  [Ankle Swelling On The Right] : mild [Varicose Veins Of Lower Extremities] : not present [] : not present [Abdomen Tenderness] : ~T ~M No abdominal tenderness [Purpura] : no purpura  [Petechiae] : no petechiae [Skin Ulcer] : ulcer [Skin Induration] : no induration [Alert] : alert [Oriented to Person] : oriented to person [Oriented to Place] : oriented to place [Oriented to Time] : oriented to time [Calm] : calm [de-identified] : Friendly, cooperative, NAD [de-identified] : NC/AT  [de-identified] : Supple  [de-identified] : Overweight [de-identified] : Limited AROM throughout LEs b/l, strength 5/5x4 [de-identified] : BLE edema from below the knees to the feet, multiple superficial ulcers over the calves, +erythema  [de-identified] : Grossly intact.

## 2021-12-09 ENCOUNTER — APPOINTMENT (OUTPATIENT)
Dept: HEART AND VASCULAR | Facility: CLINIC | Age: 66
End: 2021-12-09
Payer: MEDICARE

## 2021-12-09 VITALS
SYSTOLIC BLOOD PRESSURE: 134 MMHG | HEART RATE: 79 BPM | DIASTOLIC BLOOD PRESSURE: 84 MMHG | WEIGHT: 225 LBS | BODY MASS INDEX: 32.21 KG/M2 | TEMPERATURE: 95 F | HEIGHT: 70 IN | OXYGEN SATURATION: 94 %

## 2021-12-09 PROCEDURE — 93291 INTERROG DEV EVAL SCRMS IP: CPT

## 2021-12-09 PROCEDURE — 99213 OFFICE O/P EST LOW 20 MIN: CPT | Mod: 25

## 2021-12-09 RX ORDER — DOXYCYCLINE HYCLATE 100 MG/1
100 CAPSULE ORAL TWICE DAILY
Qty: 14 | Refills: 0 | Status: DISCONTINUED | COMMUNITY
Start: 2021-08-09 | End: 2021-12-09

## 2021-12-09 RX ORDER — PREGABALIN 200 MG/1
200 CAPSULE ORAL DAILY
Refills: 0 | Status: ACTIVE | COMMUNITY
Start: 2021-12-09

## 2021-12-09 RX ORDER — GABAPENTIN 300 MG/1
300 CAPSULE ORAL 4 TIMES DAILY
Qty: 120 | Refills: 0 | Status: DISCONTINUED | COMMUNITY
Start: 2020-05-12 | End: 2021-12-09

## 2021-12-09 RX ORDER — CEPHALEXIN 500 MG/1
500 CAPSULE ORAL 4 TIMES DAILY
Qty: 28 | Refills: 0 | Status: DISCONTINUED | COMMUNITY
Start: 2021-04-02 | End: 2021-12-09

## 2021-12-09 NOTE — PHYSICAL EXAM
[Well Developed] : well developed [Well Nourished] : well nourished [No Acute Distress] : no acute distress [Normal S1, S2] : normal S1, S2 [Clear Lung Fields] : clear lung fields [Good Air Entry] : good air entry [No Respiratory Distress] : no respiratory distress  [Soft] : abdomen soft [Abnormal Gait] : abnormal gait [Normal] : alert and oriented, normal memory [Alert and Oriented] : alert and oriented

## 2021-12-10 ENCOUNTER — APPOINTMENT (OUTPATIENT)
Dept: VASCULAR SURGERY | Facility: CLINIC | Age: 66
End: 2021-12-10
Payer: MEDICARE

## 2021-12-10 PROCEDURE — 99213 OFFICE O/P EST LOW 20 MIN: CPT

## 2021-12-13 NOTE — DISCUSSION/SUMMARY
[FreeTextEntry1] : 66 year old male with HTN, HLD, depression and atrial flutter s/p ablation (typical) 3/2021, with syncope s/p ILR, who presents for follow up.  ILR incision well healed.  Interrogation with atrial fibrillation lasting about 10 minutes.  He was unaware of this but feels like it occurred with severe pain.  He is on oral anticoagulation.  No evidence of scarlett arrhythmias.  Remote monitoring working.  He will follow up in 6 months or sooner if needed.  He knows to call with any questions or concerns.

## 2021-12-13 NOTE — PROCEDURE
[de-identified] : [employer] REVEAL LINQ\par \par battery good\par sensing good\par 2 ten min episodes of afib likely when he is having pain at night time as per his recollection

## 2021-12-13 NOTE — HISTORY OF PRESENT ILLNESS
[FreeTextEntry1] : 66 year old male with HTN, HLD, depression and atrial flutter s/p ablation (typical) 3/2021, with syncope s/p ILR, who presents for follow up.\par \par SInce his last visit he is being treated for a leg infection.  He has had no further syncope,   He is maintained on oral anticoagulation.  No palpitations, chest pain, SOB, near syncope.  He has stable LE edema.  s/p ILR with no device related complaints. \par \par

## 2021-12-13 NOTE — ADDENDUM
[FreeTextEntry1] : I, Rajat Monsalve, was present for the entire visit, including the history taking, exam, and discussion of further evaluation and management. All questions answered. I agree with the note written by my PA, Ms. Breaux, in its entirety.

## 2021-12-14 NOTE — HISTORY OF PRESENT ILLNESS
[FreeTextEntry1] : 67 y/o M w/h/o HTN, HLD, BPH, avascular necrosis of femoral heads,s/p b/l LE cellulitis in August, 2021 requiring hospitalization and IV abx returns today for an evaluation. He has been doing his own wrappings with Zinc Oxide and compression with ace bandages however he continues to experience swelling, especially of his right leg and he experiences skin burning. Furthermore he explains that he has been experiencing phantom pain that feels like  pain prior to his skin graft. He denies fever or chills. He has a loop recorder to monitor his arrhythmias which noted an Afib while he was having an episode of his phantom pain. \par \par

## 2021-12-14 NOTE — ADDENDUM
[FreeTextEntry1] : This note was written by Ivette Quiroz on 12/10/2021 acting as scribe for Nieves Shrestha M.D.\par I, Dr. Nieves Schumacher, personally performed the evaluation and management (E/M) services for this established patient who presents today with (an) existing condition(s).  That E/M includes conducting the examination, assessing all conditions, and (re)establishing/reinforcing a plan of care.  Today, my ACP, Kathryn BRAUN, was here to observe my evaluation and management services for this condition to be followed going forward.\par \par \par \par \par

## 2021-12-14 NOTE — ASSESSMENT
[Ulcer Care] : ulcer care [FreeTextEntry1] : 65 y/o M multiple episodes of cellulitis, treated w/ abx returns with worsening edema, R>L, wiping ulcers and skin erythema.\par BLE moderate edema, R>L from below the knees to the feet, superficial ulcers that are weeping, moderate erythema. Pt was recommended to c/w daily washing with soap/water, wrap with kerlix and ace bandages. I explain to him that his erythema has worsened and his symptoms are not vascular in a nature. I referred him to Dermatologist, Dr. Soni, to further evaluate his skin. He will follow up PRN. I explained that he can call the office with any questions or concerns.

## 2021-12-14 NOTE — PHYSICAL EXAM
[Respiratory Effort] : normal respiratory effort [Normal Rate and Rhythm] : normal rate and rhythm [2+] : left 2+ [Ankle Swelling (On Exam)] : present [Ankle Swelling Bilaterally] : bilaterally  [Ankle Swelling On The Right] : mild [Skin Ulcer] : ulcer [Alert] : alert [Oriented to Person] : oriented to person [Oriented to Place] : oriented to place [Oriented to Time] : oriented to time [Calm] : calm [Varicose Veins Of Lower Extremities] : not present [] : not present [Abdomen Tenderness] : ~T ~M No abdominal tenderness [Purpura] : no purpura  [Petechiae] : no petechiae [Skin Induration] : no induration [de-identified] : Friendly, cooperative, NAD [de-identified] : NC/AT  [de-identified] : Supple  [de-identified] : Overweight [de-identified] : Limited AROM throughout LEs b/l, strength 5/5x4 [de-identified] : BLE edema from below the knees to the feet, multiple superficial ulcers over the calves, +erythema  [de-identified] : Grossly intact.

## 2021-12-26 NOTE — ED ADULT TRIAGE NOTE - RESPIRATORY RATE (BREATHS/MIN)
[de-identified] : presents for f/u visit for review of her progress. hx of CVA, DM management. she is feeling well, no new events or concerns. she is becoming somewhat more active. \par \par s/p Botox inj w Dr Segura PMR which has helped with spasticity \par \par she has declined to have COVID vaccines despite education on this issue \par \par L hemiparesis s/p CVA in 2018\par HTN recently controlled, following w her cardiologist Dr Wheeler regularly. 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 s/p R RCA stent placement. following w cardiology. \par hx of focal seizures, continues on Vimpat. following w Dr Huber neurology\par chronic constipation unchanged  17

## 2022-01-14 ENCOUNTER — APPOINTMENT (OUTPATIENT)
Dept: HEART AND VASCULAR | Facility: CLINIC | Age: 67
End: 2022-01-14
Payer: MEDICARE

## 2022-01-14 ENCOUNTER — NON-APPOINTMENT (OUTPATIENT)
Age: 67
End: 2022-01-14

## 2022-01-14 ENCOUNTER — APPOINTMENT (OUTPATIENT)
Dept: ELECTROPHYSIOLOGY | Facility: CLINIC | Age: 67
End: 2022-01-14

## 2022-01-14 PROCEDURE — 93298 REM INTERROG DEV EVAL SCRMS: CPT

## 2022-01-14 PROCEDURE — G2066: CPT

## 2022-01-24 ENCOUNTER — TRANSCRIPTION ENCOUNTER (OUTPATIENT)
Age: 67
End: 2022-01-24

## 2022-02-15 NOTE — ASSESSMENT
[FreeTextEntry1] : 65 year old male with  recurrent left lower extremity cellulitis returns for a f/u. LLE  ulcerations healed, edema is better controlled. \par New RLE ulcers and edema.\par Bilateral unna boot were strapped.\par F/u in 1 week. [Ulcer Care] : ulcer care [Arterial/Venous Disease] : arterial/venous disease Hypertriglyceridemia Treatment: I explained this is common when taking isotretinoin. If this worsens they will contact us. They may try OTC ibuprofen.

## 2022-02-18 ENCOUNTER — NON-APPOINTMENT (OUTPATIENT)
Age: 67
End: 2022-02-18

## 2022-02-18 ENCOUNTER — APPOINTMENT (OUTPATIENT)
Dept: HEART AND VASCULAR | Facility: CLINIC | Age: 67
End: 2022-02-18
Payer: MEDICARE

## 2022-02-18 PROCEDURE — G2066: CPT

## 2022-02-18 PROCEDURE — 93298 REM INTERROG DEV EVAL SCRMS: CPT

## 2022-03-25 ENCOUNTER — NON-APPOINTMENT (OUTPATIENT)
Age: 67
End: 2022-03-25

## 2022-03-25 ENCOUNTER — APPOINTMENT (OUTPATIENT)
Dept: HEART AND VASCULAR | Facility: CLINIC | Age: 67
End: 2022-03-25
Payer: MEDICARE

## 2022-03-25 PROCEDURE — 93298 REM INTERROG DEV EVAL SCRMS: CPT

## 2022-03-25 PROCEDURE — G2066: CPT

## 2022-04-29 ENCOUNTER — APPOINTMENT (OUTPATIENT)
Dept: HEART AND VASCULAR | Facility: CLINIC | Age: 67
End: 2022-04-29
Payer: MEDICARE

## 2022-04-29 ENCOUNTER — NON-APPOINTMENT (OUTPATIENT)
Age: 67
End: 2022-04-29

## 2022-04-29 PROCEDURE — 93298 REM INTERROG DEV EVAL SCRMS: CPT

## 2022-04-29 PROCEDURE — G2066: CPT

## 2022-06-09 ENCOUNTER — APPOINTMENT (OUTPATIENT)
Dept: HEART AND VASCULAR | Facility: CLINIC | Age: 67
End: 2022-06-09
Payer: MEDICARE

## 2022-06-09 VITALS
HEART RATE: 71 BPM | SYSTOLIC BLOOD PRESSURE: 102 MMHG | HEIGHT: 70 IN | BODY MASS INDEX: 35.79 KG/M2 | TEMPERATURE: 97.8 F | DIASTOLIC BLOOD PRESSURE: 73 MMHG | WEIGHT: 250 LBS

## 2022-06-09 PROCEDURE — 93291 INTERROG DEV EVAL SCRMS IP: CPT

## 2022-06-09 PROCEDURE — 99213 OFFICE O/P EST LOW 20 MIN: CPT | Mod: 25

## 2022-06-09 NOTE — REASON FOR VISIT
[Follow-up Device Check] : is here today for a follow-up device check visit for [Arrhythmia/ECG Abnorrmalities] : arrhythmia/ECG abnormalities

## 2022-06-18 NOTE — PROCEDURE
[de-identified] : [employer] REVEAL LINQ\par \par battery good\par sensing good\par all recent afib ectopy

## 2022-06-18 NOTE — PHYSICAL EXAM
[Well Developed] : well developed [Well Nourished] : well nourished [No Acute Distress] : no acute distress [Normal S1, S2] : normal S1, S2 [Clear Lung Fields] : clear lung fields [Good Air Entry] : good air entry [No Respiratory Distress] : no respiratory distress  [Soft] : abdomen soft [Abnormal Gait] : abnormal gait [Normal] : alert and oriented, normal memory [Alert and Oriented] : alert and oriented [Clean] : clean [Dry] : dry [Well-Healed] : well-healed [Moves all extremities] : moves all extremities [Palpable Crepitus] : no palpable crepitus [Bleeding] : no active bleeding [Foul Odor] : no foul smell [Purulent Drainage] : no purulent drainage

## 2022-07-13 ENCOUNTER — NON-APPOINTMENT (OUTPATIENT)
Age: 67
End: 2022-07-13

## 2022-07-13 ENCOUNTER — APPOINTMENT (OUTPATIENT)
Dept: HEART AND VASCULAR | Facility: CLINIC | Age: 67
End: 2022-07-13

## 2022-07-13 PROCEDURE — 93298 REM INTERROG DEV EVAL SCRMS: CPT

## 2022-07-13 PROCEDURE — G2066: CPT

## 2022-08-17 ENCOUNTER — NON-APPOINTMENT (OUTPATIENT)
Age: 67
End: 2022-08-17

## 2022-08-17 ENCOUNTER — APPOINTMENT (OUTPATIENT)
Dept: HEART AND VASCULAR | Facility: CLINIC | Age: 67
End: 2022-08-17

## 2022-08-17 PROCEDURE — G2066: CPT

## 2022-08-17 PROCEDURE — 93298 REM INTERROG DEV EVAL SCRMS: CPT

## 2022-08-23 NOTE — PHYSICAL EXAM
[de-identified] : General: Not in acute distress, dressed appropriately, sitting on examination table\par Skin: Warm and dry, normal turgor, no rashes\par Neurological: AOx3, Cranial nerves grossly in tact\par Psych: Mood and affect appropriate\par \par Right Hip: No swelling edema erythema redness or drainage. Non-Tender ROM: Hip flexion 100, abduction 30, int/ext rotation 45. Painful range of motion. 5/5 strength. Normal gait. Ambulates with crutches. \par \par General: Not in acute distress, dressed appropriately, sitting on examination table\par Skin: Warm and dry, normal turgor, no rashes\par Neurological: AOx3, Cranial nerves grossly in tact\par Psych: Mood and affect appropriate\par \par Left Hip: No swelling edema erythema redness or drainage. Non-tender. ROM: Hip flexion 120, abduction 30, int/ext rotation 45. Painless range of motion. 5/5 strength. Normal gait. Ambulates with crutches.  \par \par General: Not in acute distress, dressed appropriately, sitting on examination table\par Skin: Warm and dry, normal turgor, no rashes\par Neurological: AOx3, Cranial nerves grossly in tact\par Psych: Mood and affect appropriate\par \par Right Knee: Alignment: Neutral Non-Tender: ROM: 0-120 Stable 5/5 Strength. DNVI. Ambulates with crutches. \par \par Left Knee: No swelling edema erythema redness or drainage. tender anteriorly. Alignment: Neutral ROM: 0-120 Stable. 5/5 Strength. DNVI. Ambulates with crutches. \par \par \par \par \par \par \par \par \par \par  [de-identified] : No imaging today.\par  Normal gait / station

## 2022-09-21 ENCOUNTER — NON-APPOINTMENT (OUTPATIENT)
Age: 67
End: 2022-09-21

## 2022-09-21 ENCOUNTER — APPOINTMENT (OUTPATIENT)
Dept: HEART AND VASCULAR | Facility: CLINIC | Age: 67
End: 2022-09-21
Payer: MEDICARE

## 2022-09-21 PROCEDURE — G2066: CPT

## 2022-09-21 PROCEDURE — 93298 REM INTERROG DEV EVAL SCRMS: CPT

## 2022-10-06 ENCOUNTER — APPOINTMENT (OUTPATIENT)
Dept: HEART AND VASCULAR | Facility: CLINIC | Age: 67
End: 2022-10-06

## 2022-10-06 VITALS
HEIGHT: 70 IN | SYSTOLIC BLOOD PRESSURE: 91 MMHG | HEART RATE: 73 BPM | DIASTOLIC BLOOD PRESSURE: 54 MMHG | BODY MASS INDEX: 35.79 KG/M2 | WEIGHT: 250 LBS

## 2022-10-06 PROCEDURE — 93291 INTERROG DEV EVAL SCRMS IP: CPT

## 2022-10-12 NOTE — PHYSICAL EXAM
[Clean] : clean [Dry] : dry [Well-Healed] : well-healed [Well Developed] : well developed [Well Nourished] : well nourished [No Acute Distress] : no acute distress [Normal S1, S2] : normal S1, S2 [Clear Lung Fields] : clear lung fields [Good Air Entry] : good air entry [No Respiratory Distress] : no respiratory distress  [Soft] : abdomen soft [Abnormal Gait] : abnormal gait [Moves all extremities] : moves all extremities [Normal] : alert and oriented, normal memory [Alert and Oriented] : alert and oriented [Palpable Crepitus] : no palpable crepitus [Bleeding] : no active bleeding [Foul Odor] : no foul smell [Purulent Drainage] : no purulent drainage [Serous Drainage] : no serous drainage [Erythema] : not erythematous [Warm] : not warm [Tender] : not tender [Indurated] : not indurated [Fluctuant] : not fluctuant

## 2022-10-12 NOTE — PROCEDURE
[de-identified] : [employer] REVEAL LINQ\par \par battery good\par sensing good\par short bursts of AT / afib

## 2022-10-12 NOTE — REVIEW OF SYSTEMS
[Negative] : Heme/Lymph [Joint Pain] : joint pain [Fever] : no fever [Weight Gain (___ Lbs)] : no recent weight gain [Chills] : no chills [Feeling Fatigued] : not feeling fatigued [Weight Loss (___ Lbs)] : no recent weight loss [SOB] : no shortness of breath [Dyspnea on exertion] : not dyspnea during exertion [Chest Discomfort] : no chest discomfort [Palpitations] : no palpitations [Syncope] : no syncope

## 2022-10-12 NOTE — HISTORY OF PRESENT ILLNESS
[FreeTextEntry1] : 67 year old male with HTN, HLD, depression and atrial flutter s/p ablation (typical) 3/2021, with syncope s/p ILR, who presents for follow up.\par \par SInce his last visit he has been doing well from a cardiac standpoint.   His only complaints are from his degenerative joint disease.  He has had no further syncope,   He is maintained on oral anticoagulation.  No palpitations, chest pain, SOB, near syncope.  He has stable LE edema.  s/p ILR with no device related complaints. \par \par

## 2022-10-12 NOTE — DISCUSSION/SUMMARY
[FreeTextEntry1] : 67 year old male with HTN, HLD, depression and atrial flutter s/p ablation (typical) 3/2021, with syncope s/p ILR, who presents for follow up.  ILR interrogation with short yakov of AT / afib that he is asymptomatic from.  He is on oral anticoagulation.  No evidence of scarlett arrhythmias.  Remote monitoring working.  He will follow up in 6 months or sooner if needed.  He knows to call with any questions or concerns.

## 2022-11-04 ENCOUNTER — APPOINTMENT (OUTPATIENT)
Dept: VASCULAR SURGERY | Facility: CLINIC | Age: 67
End: 2022-11-04
Payer: MEDICARE

## 2022-11-04 DIAGNOSIS — L03.119 CELLULITIS OF UNSPECIFIED PART OF LIMB: ICD-10-CM

## 2022-11-04 PROCEDURE — 99213 OFFICE O/P EST LOW 20 MIN: CPT

## 2022-11-04 PROCEDURE — 93970 EXTREMITY STUDY: CPT

## 2022-11-07 NOTE — HISTORY OF PRESENT ILLNESS
[FreeTextEntry1] : 66 y/o M w/h/o HTN, HLD, BPH, avascular necrosis of femoral heads, s/p b/l LE cellulitis in August, 2021 requiring hospitalization and IV abx returns today for an evaluation. He noticed increased swelling that started few days ago, especially of his left leg and he experiences skin burning. He noticed skin erythema and worries it might be "skin infection again".  He denies fever or chills. \par \par

## 2022-11-07 NOTE — ASSESSMENT
[FreeTextEntry1] : 65 y/o M multiple episodes of cellulitis, treated w/ abx returns with worsening edema,L>R, skin erythema suggestive of recurrent cellulitis \par BLE moderate edema, R>L from below the knees to the feet, moderate erythema. \par We recommended for the patient to start oral antibiotics, Rx for Augmentin and Cipro was sent to his pharmacy.\par Also c/w daily washing with soap/water, wrap with kerlix and ace bandages. I explain to him if his erythema is worse, or fever, chills he should go to ER to be admitted. He verbalized understanding and will update us on his progress.  [Ulcer Care] : ulcer care

## 2022-11-07 NOTE — REVIEW OF SYSTEMS
[Fever] : no fever [Chills] : no chills [As noted in HPI] : as noted in HPI [Lower Ext Edema] : lower extremity edema [Limb Pain] : limb pain [Limb Swelling] : limb swelling [As Noted in HPI] : as noted in HPI [Negative] : Heme/Lymph

## 2022-11-07 NOTE — PROCEDURE
[FreeTextEntry1] : BL venous doppler was done in the office demonstrating severe edema, B/L groins enlarged lymph nodes appreciated

## 2022-11-07 NOTE — PHYSICAL EXAM
[Respiratory Effort] : normal respiratory effort [Normal Rate and Rhythm] : normal rate and rhythm [2+] : left 2+ [Ankle Swelling (On Exam)] : present [Ankle Swelling Bilaterally] : bilaterally  [Ankle Swelling On The Left] : moderate [Varicose Veins Of Lower Extremities] : not present [] : not present [Abdomen Tenderness] : ~T ~M No abdominal tenderness [Purpura] : no purpura  [Petechiae] : no petechiae [Skin Ulcer] : ulcer [Skin Induration] : no induration [Alert] : alert [Oriented to Person] : oriented to person [Oriented to Place] : oriented to place [Oriented to Time] : oriented to time [Calm] : calm [de-identified] : Friendly, cooperative, NAD [de-identified] : NC/AT  [de-identified] : Supple  [de-identified] : Overweight [de-identified] : Limited AROM throughout LEs b/l, strength 5/5x4 [de-identified] : BLE edema from below the knees to the feet, +erythema, L>R, no skin breakdown [de-identified] : Grossly intact.

## 2022-11-10 ENCOUNTER — APPOINTMENT (OUTPATIENT)
Dept: HEART AND VASCULAR | Facility: CLINIC | Age: 67
End: 2022-11-10

## 2022-11-10 ENCOUNTER — NON-APPOINTMENT (OUTPATIENT)
Age: 67
End: 2022-11-10

## 2022-11-10 PROCEDURE — 93298 REM INTERROG DEV EVAL SCRMS: CPT

## 2022-11-10 PROCEDURE — G2066: CPT

## 2022-11-14 ENCOUNTER — APPOINTMENT (OUTPATIENT)
Dept: VASCULAR SURGERY | Facility: CLINIC | Age: 67
End: 2022-11-14

## 2022-11-14 VITALS
SYSTOLIC BLOOD PRESSURE: 110 MMHG | DIASTOLIC BLOOD PRESSURE: 53 MMHG | BODY MASS INDEX: 35.79 KG/M2 | HEART RATE: 56 BPM | HEIGHT: 70 IN | WEIGHT: 250 LBS

## 2022-11-14 PROCEDURE — 29580 STRAPPING UNNA BOOT: CPT

## 2022-11-14 PROCEDURE — 99212 OFFICE O/P EST SF 10 MIN: CPT | Mod: 25

## 2022-11-14 NOTE — PROCEDURE
[FreeTextEntry1] : LLE: Silvadene ointment was applied to ruptured blister over posterior calf, leg was strapped with unna boot, kerlix, ace

## 2022-11-14 NOTE — PHYSICAL EXAM
[Respiratory Effort] : normal respiratory effort [Normal Rate and Rhythm] : normal rate and rhythm [2+] : left 2+ [Ankle Swelling (On Exam)] : present [Ankle Swelling Bilaterally] : bilaterally  [Ankle Swelling On The Left] : moderate [Varicose Veins Of Lower Extremities] : not present [] : not present [Abdomen Tenderness] : ~T ~M No abdominal tenderness [Purpura] : no purpura  [Petechiae] : no petechiae [Skin Ulcer] : ulcer [Skin Induration] : no induration [Alert] : alert [Oriented to Person] : oriented to person [Oriented to Place] : oriented to place [Oriented to Time] : oriented to time [Calm] : calm [de-identified] : Friendly, cooperative, NAD [de-identified] : NC/AT  [de-identified] : Overweight [de-identified] : Supple  [de-identified] : Limited AROM throughout LEs b/l, strength 5/5x4 [de-identified] : BLE edema from below the knees to the feet, Left lower leg improved erythema, 3x2 cm ruptured blister over posterior calf, no signs of infection [de-identified] : Grossly intact.

## 2022-11-14 NOTE — HISTORY OF PRESENT ILLNESS
[FreeTextEntry1] : 66 y/o M w/h/o HTN, HLD, BPH, avascular necrosis of femoral heads, s/p b/l LE cellulitis in August, 2021 requiring hospitalization and IV abx returns today for a f/u. He He was seen last week with recurrent LLE cellulitis. We started him on Augmentin and Cipro and he returns today for an evaluation. He states that he feels better, leg edema and erythema subsiding. He noticed a blister over his posterior calf that ruptured on its own. He washes his legs daily with soap/water and wraps them with ace bandages to control edema.  He denies fever or chills. \par \par

## 2022-11-14 NOTE — ASSESSMENT
[FreeTextEntry1] : 68 y/o M w/h/o HTN, HLD, BPH, avascular necrosis of femoral heads, s/p b/l LE cellulitis in August, 2021 requiring hospitalization and IV abx returns today for a f/u. He He was seen last week with recurrent LLE cellulitis. We started him on Augmentin and Cipro and he returns today for an evaluation. \par BLE edema from below the knees to the feet, Left lower leg improved erythema and edema, 3x2 cm ruptured blister over posterior calf, no signs of infection.\par Silvadene ointment was applied to ruptured blister over posterior calf, leg was strapped with unna boot, kerlix, ace.\par We recommended to continue Augmentin for one more week.\par He will remove unna boot next Monday and come for an evaluation. I explained to him if he develops fever, chills he should go to ER to be admitted. He verbalized understanding and will update us on his progress.  [Foot care/Footwear] : foot care/footwear [Ulcer Care] : ulcer care

## 2022-11-21 ENCOUNTER — APPOINTMENT (OUTPATIENT)
Dept: VASCULAR SURGERY | Facility: CLINIC | Age: 67
End: 2022-11-21

## 2022-11-21 DIAGNOSIS — L97.921 NON-PRESSURE CHRONIC ULCER OF UNSPECIFIED PART OF LEFT LOWER LEG LIMITED TO BREAKDOWN OF SKIN: ICD-10-CM

## 2022-11-21 DIAGNOSIS — L03.116 CELLULITIS OF LEFT LOWER LIMB: ICD-10-CM

## 2022-11-21 PROCEDURE — 29580 STRAPPING UNNA BOOT: CPT

## 2022-11-21 PROCEDURE — 99212 OFFICE O/P EST SF 10 MIN: CPT | Mod: 25

## 2022-11-21 NOTE — ASSESSMENT
[Foot care/Footwear] : foot care/footwear [Ulcer Care] : ulcer care [FreeTextEntry1] : 68 y/o M w/h/o HTN, HLD, BPH, avascular necrosis of femoral heads, s/p b/l LE cellulitis in August, 2021 requiring hospitalization and IV abx returns today for a f/u. He He returned with recurrent LLE cellulitis, s/p abx's. \par BLE edema from below the knees to the feet, Left lower leg improved erythema and edema, 2x2 cm ruptured blister over posterior calf healing well, no signs of infection.\par Left leg was strapped with unna boot, kerlix, ace.\par He will remove unna boot next Monday and if necessary he will come for an evaluation. I explained to him if he develops fever, chills he should go to ER to be admitted. He verbalized understanding and will update us on his progress.

## 2022-11-21 NOTE — REVIEW OF SYSTEMS
[As noted in HPI] : as noted in HPI [Lower Ext Edema] : lower extremity edema [Limb Pain] : limb pain [Limb Swelling] : limb swelling [As Noted in HPI] : as noted in HPI [Negative] : Heme/Lymph [Fever] : no fever [Chills] : no chills

## 2022-11-21 NOTE — PHYSICAL EXAM
[Respiratory Effort] : normal respiratory effort [Normal Rate and Rhythm] : normal rate and rhythm [2+] : left 2+ [Ankle Swelling (On Exam)] : present [Ankle Swelling Bilaterally] : bilaterally  [Skin Ulcer] : ulcer [Alert] : alert [Oriented to Person] : oriented to person [Oriented to Place] : oriented to place [Oriented to Time] : oriented to time [Calm] : calm [Ankle Swelling On The Right] : mild [Varicose Veins Of Lower Extremities] : not present [] : not present [Abdomen Tenderness] : ~T ~M No abdominal tenderness [Purpura] : no purpura  [Petechiae] : no petechiae [Skin Induration] : no induration [de-identified] : Friendly, cooperative, NAD [de-identified] : NC/AT  [de-identified] : Supple  [de-identified] : Overweight [de-identified] : Limited AROM throughout LEs b/l, strength 5/5x4 [de-identified] : BLE edema from below the knees to the feet, Left lower leg improved erythema, 2x2 cm ruptured blister over posterior calf healing well, no signs of infection [de-identified] : Grossly intact.

## 2022-12-15 ENCOUNTER — NON-APPOINTMENT (OUTPATIENT)
Age: 67
End: 2022-12-15

## 2022-12-15 ENCOUNTER — APPOINTMENT (OUTPATIENT)
Dept: HEART AND VASCULAR | Facility: CLINIC | Age: 67
End: 2022-12-15

## 2022-12-15 PROCEDURE — G2066: CPT

## 2022-12-15 PROCEDURE — 93298 REM INTERROG DEV EVAL SCRMS: CPT

## 2023-01-19 ENCOUNTER — NON-APPOINTMENT (OUTPATIENT)
Age: 68
End: 2023-01-19

## 2023-01-19 ENCOUNTER — APPOINTMENT (OUTPATIENT)
Dept: HEART AND VASCULAR | Facility: CLINIC | Age: 68
End: 2023-01-19
Payer: MEDICARE

## 2023-01-19 PROCEDURE — 93298 REM INTERROG DEV EVAL SCRMS: CPT

## 2023-01-19 PROCEDURE — G2066: CPT

## 2023-02-13 ENCOUNTER — APPOINTMENT (OUTPATIENT)
Dept: VASCULAR SURGERY | Facility: CLINIC | Age: 68
End: 2023-02-13

## 2023-02-23 ENCOUNTER — APPOINTMENT (OUTPATIENT)
Dept: HEART AND VASCULAR | Facility: CLINIC | Age: 68
End: 2023-02-23
Payer: MEDICARE

## 2023-02-23 ENCOUNTER — NON-APPOINTMENT (OUTPATIENT)
Age: 68
End: 2023-02-23

## 2023-02-23 PROCEDURE — 93298 REM INTERROG DEV EVAL SCRMS: CPT

## 2023-02-23 PROCEDURE — G2066: CPT

## 2023-03-15 NOTE — REASON FOR VISIT
----- Message from Renate Galo MD sent at 3/14/2023  5:34 PM EDT -----  Please inform the patient that sodium level was low.  Please check if she has been taking her diuretic regularly and check on her fluid intake.  Recommend repeating CMP 1 day before she sees her PCP next week to make sure it is improving.      Thank you     [Follow-Up: _____] : a [unfilled] follow-up visit

## 2023-03-31 ENCOUNTER — NON-APPOINTMENT (OUTPATIENT)
Age: 68
End: 2023-03-31

## 2023-03-31 ENCOUNTER — APPOINTMENT (OUTPATIENT)
Dept: HEART AND VASCULAR | Facility: CLINIC | Age: 68
End: 2023-03-31
Payer: MEDICARE

## 2023-03-31 PROCEDURE — G2066: CPT

## 2023-03-31 PROCEDURE — 93298 REM INTERROG DEV EVAL SCRMS: CPT

## 2023-04-06 ENCOUNTER — APPOINTMENT (OUTPATIENT)
Dept: HEART AND VASCULAR | Facility: CLINIC | Age: 68
End: 2023-04-06
Payer: MEDICARE

## 2023-04-06 VITALS
DIASTOLIC BLOOD PRESSURE: 53 MMHG | SYSTOLIC BLOOD PRESSURE: 115 MMHG | HEIGHT: 70 IN | TEMPERATURE: 95.8 F | WEIGHT: 250 LBS | HEART RATE: 49 BPM | BODY MASS INDEX: 35.79 KG/M2

## 2023-04-06 PROCEDURE — 93291 INTERROG DEV EVAL SCRMS IP: CPT

## 2023-04-12 NOTE — PROCEDURE
[de-identified] : [employer] REVEAL LINQ\par \par battery good\par sensing good\par short bursts of AT / afib - asymptomatic from

## 2023-04-12 NOTE — PHYSICAL EXAM
[Clean] : clean [Dry] : dry [Well-Healed] : well-healed [Well Developed] : well developed [Well Nourished] : well nourished [No Acute Distress] : no acute distress [Normal S1, S2] : normal S1, S2 [Clear Lung Fields] : clear lung fields [Good Air Entry] : good air entry [No Respiratory Distress] : no respiratory distress  [Normal] : alert and oriented, normal memory [Alert and Oriented] : alert and oriented [Abnormal Gait] : abnormal gait [Palpable Crepitus] : no palpable crepitus [Bleeding] : no active bleeding [Foul Odor] : no foul smell [Purulent Drainage] : no purulent drainage [Serosanguineous Drainage] : no serosanquineous drainage [Serous Drainage] : no serous drainage [Erythema] : not erythematous [Warm] : not warm [Tender] : not tender [Indurated] : not indurated [Fluctuant] : not fluctuant

## 2023-04-12 NOTE — ADDENDUM
[FreeTextEntry1] : I, Rajat Monsalve, was present for the entire visit, including the history taking, exam, and discussion of further evaluation and management. All questions answered. I agree with the note written by my PA, Ms. Breaux, in its entirety.\par I, Jurgen Breaux, am scribing for and the presence of Dr. Monsalve the following sections: HPI, PMH,Family/social history, ROS, Physical Exam, Assessment / Plan.\par

## 2023-04-12 NOTE — DISCUSSION/SUMMARY
[FreeTextEntry1] : 68 year old male with HTN, HLD, depression and atrial flutter s/p ablation (typical) 3/2021, with syncope s/p ILR, who presents for follow up.  ILR interrogation with short yakov of AT / afib that he is asymptomatic from.  He is on oral anticoagulation.  No evidence of scarlett arrhythmias.  Remote monitoring working.  He will follow up in 6 months or sooner if needed.  He knows to call with any questions or concerns in the interim

## 2023-04-12 NOTE — REVIEW OF SYSTEMS
[Joint Pain] : joint pain [Negative] : Integumentary [Fever] : no fever [Weight Gain (___ Lbs)] : no recent weight gain [Chills] : no chills [Feeling Fatigued] : not feeling fatigued [Weight Loss (___ Lbs)] : no recent weight loss [SOB] : no shortness of breath [Dyspnea on exertion] : not dyspnea during exertion [Chest Discomfort] : no chest discomfort [Palpitations] : no palpitations [Syncope] : no syncope

## 2023-04-12 NOTE — HISTORY OF PRESENT ILLNESS
[FreeTextEntry1] : 68 year old male with HTN, HLD, depression and atrial flutter s/p ablation (typical) 3/2021, with syncope s/p ILR, who presents for follow up.\par \par SInce his last visit he has been doing well from a cardiac standpoint.   He still complains of pains from his degenerative joint disease.  He has had no further syncope,   He is maintained on oral anticoagulation.  No palpitations, chest pain, SOB, near syncope.  He has stable LE edema.  s/p ILR with no device related complaints. \par \par

## 2023-05-11 ENCOUNTER — NON-APPOINTMENT (OUTPATIENT)
Age: 68
End: 2023-05-11

## 2023-05-11 ENCOUNTER — APPOINTMENT (OUTPATIENT)
Dept: HEART AND VASCULAR | Facility: CLINIC | Age: 68
End: 2023-05-11
Payer: MEDICARE

## 2023-05-11 PROCEDURE — 93298 REM INTERROG DEV EVAL SCRMS: CPT

## 2023-05-11 PROCEDURE — G2066: CPT

## 2023-06-14 NOTE — ED ADULT TRIAGE NOTE - PRO INTERPRETER NEED 2
English Bilobed Transposition Flap Text: The defect edges were debeveled with a #15 scalpel blade. Given the location of the defect and the proximity to free margins a bilobed transposition flap was deemed most appropriate. Using a sterile surgical marker, an appropriate bilobe flap drawn around the defect. The area thus outlined was incised deep to adipose tissue with a #15 scalpel blade. The skin margins were undermined to an appropriate distance in all directions utilizing iris scissors. Following this, the designed flap was carried over into the primary defect and sutured into place.

## 2023-06-15 ENCOUNTER — APPOINTMENT (OUTPATIENT)
Dept: HEART AND VASCULAR | Facility: CLINIC | Age: 68
End: 2023-06-15
Payer: MEDICARE

## 2023-06-15 ENCOUNTER — NON-APPOINTMENT (OUTPATIENT)
Age: 68
End: 2023-06-15

## 2023-06-15 PROCEDURE — 93298 REM INTERROG DEV EVAL SCRMS: CPT

## 2023-06-15 PROCEDURE — G2066: CPT

## 2023-07-03 RX ORDER — METOPROLOL TARTRATE 25 MG/1
25 TABLET, FILM COATED ORAL TWICE DAILY
Qty: 180 | Refills: 3 | Status: ACTIVE | COMMUNITY
Start: 2023-07-03 | End: 1900-01-01

## 2023-07-03 RX ORDER — METOPROLOL SUCCINATE 25 MG/1
25 TABLET, EXTENDED RELEASE ORAL
Refills: 0 | Status: DISCONTINUED | COMMUNITY
Start: 2021-10-14 | End: 2023-07-03

## 2023-07-20 ENCOUNTER — APPOINTMENT (OUTPATIENT)
Dept: HEART AND VASCULAR | Facility: CLINIC | Age: 68
End: 2023-07-20
Payer: MEDICARE

## 2023-07-20 ENCOUNTER — NON-APPOINTMENT (OUTPATIENT)
Age: 68
End: 2023-07-20

## 2023-07-20 PROCEDURE — G2066: CPT

## 2023-07-20 PROCEDURE — 93298 REM INTERROG DEV EVAL SCRMS: CPT

## 2023-08-23 ENCOUNTER — APPOINTMENT (OUTPATIENT)
Dept: HEART AND VASCULAR | Facility: CLINIC | Age: 68
End: 2023-08-23
Payer: MEDICARE

## 2023-08-24 ENCOUNTER — NON-APPOINTMENT (OUTPATIENT)
Age: 68
End: 2023-08-24

## 2023-08-24 PROCEDURE — G2066: CPT

## 2023-08-24 PROCEDURE — 93298 REM INTERROG DEV EVAL SCRMS: CPT

## 2023-09-27 ENCOUNTER — APPOINTMENT (OUTPATIENT)
Dept: HEART AND VASCULAR | Facility: CLINIC | Age: 68
End: 2023-09-27
Payer: MEDICARE

## 2023-09-27 ENCOUNTER — NON-APPOINTMENT (OUTPATIENT)
Age: 68
End: 2023-09-27

## 2023-09-27 PROCEDURE — G2066: CPT

## 2023-09-27 PROCEDURE — 93298 REM INTERROG DEV EVAL SCRMS: CPT

## 2023-11-06 NOTE — ED ADULT NURSE NOTE - IS THE PATIENT ABLE TO BE SCREENED?
Nutrition Assessment   Reason for Consult/Assessment: Follow up      Diagnosis and Hx: Reviewed      Pertinent Nutrition Information: General surgery's note reviewed from today.  Per EMR, pt's diet advanced today.  Pt states she danii breakfast but with poor po intake as she is taking it slowly.  Pt states she will try the Ensure and Ensure Clear supplements.                                 Diet Order: TPN/PPN, Oral nutrition supplement/snacks, 2gm sodium (low sodium) (Low cholesterol/saturated fat, Lactose restricted)   TPN/PPN: TPN = 325 gm dextrose, 130 gm amino acids, and 50 gm lipids     Nutrition supplement/snacks: Ensure HP/low carb (chocolate) BID and Ensure Clear (apple, berry) BID        Diet tolerance: TBD   Food Allergies: None known    Demographic/Anthropometrics Information  Gender: female  Patient Age: 63 year old  Height:   Ht Readings from Last 1 Encounters:   09/30/23 5' 4\" (1.626 m)      Weight:   Wt Readings from Last 1 Encounters:   10/18/23 70.5 kg      BMI:   BMI Readings from Last 1 Encounters:   10/18/23 26.68 kg/m²         Estimated Needs:  Calculated Energy Needs: 6628-1403  kcal               Calculated protein needs: 106-141  g    Calculated Fluid Needs: Per Provider                           TREATMENT PLAN: Monitoring & Interventions   Intervention: Meals and snacks, Nutrition supplement therapy, Parenteral nutrition/IV fluids      Meals & snacks: Recommend continuing the Low cholesterol/saturated fat, 2 gm sodium, Lactose restricted diet as danii.                                        Access Site: PICC   Lipids Provided by Parenteral Nutrition: 50 gm daily  Protein Provided by Parenteral Nutrition: 130 gm (100% needs)  Dextrose Provided by Parenteral Nutrition: 325 gm  Calories Provided by Parenteral Nutrition: 2125 kcals (100% needs)     Nutrition supplement therapy: Recommend continuing the Ensure Clear (apple, berry) BID and Ensure HP/low carb (chocolate) BID.       Goal: Increase oral  intake to >/equal 50% of meals and supplements   Intervention goal status: New goal identified  Time frame to achieve goal: 5-7 days     Dietitian will monitor: Food, beverage, and nutrient intake, Biochemical data, medical tests, procedures, Parenteral nutrition intake            Nutrition Diagnosis / PES  Nutrition Diagnosis: Malnutrition  Malnutrition in the context of acute illness or injury: Severe  Related to: Abdominal pain, Decreased intake, Altered GI function/GI disorder, Nausea, Vomiting       Malnutrition diagnosis acute illness/injury; severe: Weight loss of >7.5%/3 months, Moderate depletion of body fat, Moderate depletion of muscle mass, Energy intake of <50% of estimated energy requirement for >/equals 5 days  Primary Nutrition Diagnosis status: Active nutrition diagnosis                    Yes

## 2023-11-16 ENCOUNTER — APPOINTMENT (OUTPATIENT)
Dept: HEART AND VASCULAR | Facility: CLINIC | Age: 68
End: 2023-11-16
Payer: MEDICARE

## 2023-11-16 VITALS
DIASTOLIC BLOOD PRESSURE: 86 MMHG | WEIGHT: 250 LBS | SYSTOLIC BLOOD PRESSURE: 139 MMHG | OXYGEN SATURATION: 95 % | HEART RATE: 88 BPM | BODY MASS INDEX: 35.79 KG/M2 | HEIGHT: 70 IN

## 2023-11-16 PROCEDURE — 93291 INTERROG DEV EVAL SCRMS IP: CPT

## 2023-11-16 PROCEDURE — 99213 OFFICE O/P EST LOW 20 MIN: CPT | Mod: 25

## 2023-11-27 ENCOUNTER — APPOINTMENT (OUTPATIENT)
Dept: HEART AND VASCULAR | Facility: CLINIC | Age: 68
End: 2023-11-27
Payer: MEDICARE

## 2023-11-27 ENCOUNTER — NON-APPOINTMENT (OUTPATIENT)
Age: 68
End: 2023-11-27

## 2023-11-28 PROCEDURE — 93298 REM INTERROG DEV EVAL SCRMS: CPT | Mod: NC

## 2023-11-28 PROCEDURE — G2066: CPT | Mod: NC

## 2023-12-13 NOTE — ASU PATIENT PROFILE, ADULT - BILL OF RIGHTS/ADMISSION INFORMATION PROVIDED TO:
-- DO NOT REPLY / DO NOT REPLY ALL --  -- Message is from Engagement Center Operations ([facility]) --    General Patient Message: Patient mother Debby called requesting a copy of referral from last year or earlier this year. Please call.        Caller Information         Type Contact Phone/Fax    12/13/2023 01:00 PM CST Phone (Incoming) ABBEY LUND (Mother) [phone]          Alternative phone number: No    Can a detailed message be left? Yes    Message Turnaround:     Is it Working Hours? Yes - Working Hours     [place]:    Please give this turnaround time to the caller:   \"This message will be sent to [state Provider's name]. The clinical team will fulfill your request as soon as they review your message.\"                 Patient

## 2023-12-20 ENCOUNTER — EMERGENCY (EMERGENCY)
Facility: HOSPITAL | Age: 68
LOS: 1 days | Discharge: HOME | End: 2023-12-20
Attending: EMERGENCY MEDICINE | Admitting: EMERGENCY MEDICINE
Payer: MEDICARE

## 2023-12-20 VITALS
SYSTOLIC BLOOD PRESSURE: 122 MMHG | OXYGEN SATURATION: 100 % | RESPIRATION RATE: 19 BRPM | HEART RATE: 90 BPM | DIASTOLIC BLOOD PRESSURE: 80 MMHG

## 2023-12-20 VITALS
OXYGEN SATURATION: 100 % | SYSTOLIC BLOOD PRESSURE: 115 MMHG | DIASTOLIC BLOOD PRESSURE: 77 MMHG | TEMPERATURE: 98 F | HEART RATE: 98 BPM | RESPIRATION RATE: 18 BRPM | WEIGHT: 250 LBS

## 2023-12-20 DIAGNOSIS — R31.0 GROSS HEMATURIA: ICD-10-CM

## 2023-12-20 DIAGNOSIS — Z79.01 LONG TERM (CURRENT) USE OF ANTICOAGULANTS: ICD-10-CM

## 2023-12-20 DIAGNOSIS — M87.9 OSTEONECROSIS, UNSPECIFIED: ICD-10-CM

## 2023-12-20 DIAGNOSIS — G62.9 POLYNEUROPATHY, UNSPECIFIED: ICD-10-CM

## 2023-12-20 DIAGNOSIS — K46.9 UNSPECIFIED ABDOMINAL HERNIA WITHOUT OBSTRUCTION OR GANGRENE: Chronic | ICD-10-CM

## 2023-12-20 DIAGNOSIS — I48.91 UNSPECIFIED ATRIAL FIBRILLATION: ICD-10-CM

## 2023-12-20 DIAGNOSIS — Z98.890 OTHER SPECIFIED POSTPROCEDURAL STATES: Chronic | ICD-10-CM

## 2023-12-20 DIAGNOSIS — Z41.9 ENCOUNTER FOR PROCEDURE FOR PURPOSES OTHER THAN REMEDYING HEALTH STATE, UNSPECIFIED: Chronic | ICD-10-CM

## 2023-12-20 DIAGNOSIS — D72.829 ELEVATED WHITE BLOOD CELL COUNT, UNSPECIFIED: ICD-10-CM

## 2023-12-20 DIAGNOSIS — T14.8 OTHER INJURY OF UNSPECIFIED BODY REGION: Chronic | ICD-10-CM

## 2023-12-20 DIAGNOSIS — Z88.8 ALLERGY STATUS TO OTHER DRUGS, MEDICAMENTS AND BIOLOGICAL SUBSTANCES: ICD-10-CM

## 2023-12-20 DIAGNOSIS — T24.309A BURN OF THIRD DEGREE OF UNSPECIFIED SITE OF UNSPECIFIED LOWER LIMB, EXCEPT ANKLE AND FOOT, INITIAL ENCOUNTER: Chronic | ICD-10-CM

## 2023-12-20 LAB
ANION GAP SERPL CALC-SCNC: 8 MMOL/L — SIGNIFICANT CHANGE UP (ref 5–17)
ANION GAP SERPL CALC-SCNC: 8 MMOL/L — SIGNIFICANT CHANGE UP (ref 5–17)
APPEARANCE UR: CLEAR — SIGNIFICANT CHANGE UP
APPEARANCE UR: CLEAR — SIGNIFICANT CHANGE UP
BACTERIA # UR AUTO: NEGATIVE /HPF — SIGNIFICANT CHANGE UP
BACTERIA # UR AUTO: NEGATIVE /HPF — SIGNIFICANT CHANGE UP
BASOPHILS # BLD AUTO: 0.06 K/UL — SIGNIFICANT CHANGE UP (ref 0–0.2)
BASOPHILS # BLD AUTO: 0.06 K/UL — SIGNIFICANT CHANGE UP (ref 0–0.2)
BASOPHILS NFR BLD AUTO: 0.5 % — SIGNIFICANT CHANGE UP (ref 0–2)
BASOPHILS NFR BLD AUTO: 0.5 % — SIGNIFICANT CHANGE UP (ref 0–2)
BILIRUB UR-MCNC: NEGATIVE — SIGNIFICANT CHANGE UP
BILIRUB UR-MCNC: NEGATIVE — SIGNIFICANT CHANGE UP
BUN SERPL-MCNC: 21 MG/DL — SIGNIFICANT CHANGE UP (ref 7–23)
BUN SERPL-MCNC: 21 MG/DL — SIGNIFICANT CHANGE UP (ref 7–23)
CALCIUM SERPL-MCNC: 9.6 MG/DL — SIGNIFICANT CHANGE UP (ref 8.4–10.5)
CALCIUM SERPL-MCNC: 9.6 MG/DL — SIGNIFICANT CHANGE UP (ref 8.4–10.5)
CAST: 0 /LPF — SIGNIFICANT CHANGE UP (ref 0–4)
CAST: 0 /LPF — SIGNIFICANT CHANGE UP (ref 0–4)
CHLORIDE SERPL-SCNC: 107 MMOL/L — SIGNIFICANT CHANGE UP (ref 96–108)
CHLORIDE SERPL-SCNC: 107 MMOL/L — SIGNIFICANT CHANGE UP (ref 96–108)
CO2 SERPL-SCNC: 25 MMOL/L — SIGNIFICANT CHANGE UP (ref 22–31)
CO2 SERPL-SCNC: 25 MMOL/L — SIGNIFICANT CHANGE UP (ref 22–31)
COLOR SPEC: YELLOW — SIGNIFICANT CHANGE UP
COLOR SPEC: YELLOW — SIGNIFICANT CHANGE UP
CREAT SERPL-MCNC: 0.97 MG/DL — SIGNIFICANT CHANGE UP (ref 0.5–1.3)
CREAT SERPL-MCNC: 0.97 MG/DL — SIGNIFICANT CHANGE UP (ref 0.5–1.3)
DIFF PNL FLD: ABNORMAL
DIFF PNL FLD: ABNORMAL
EGFR: 85 ML/MIN/1.73M2 — SIGNIFICANT CHANGE UP
EGFR: 85 ML/MIN/1.73M2 — SIGNIFICANT CHANGE UP
EOSINOPHIL # BLD AUTO: 0.13 K/UL — SIGNIFICANT CHANGE UP (ref 0–0.5)
EOSINOPHIL # BLD AUTO: 0.13 K/UL — SIGNIFICANT CHANGE UP (ref 0–0.5)
EOSINOPHIL NFR BLD AUTO: 1 % — SIGNIFICANT CHANGE UP (ref 0–6)
EOSINOPHIL NFR BLD AUTO: 1 % — SIGNIFICANT CHANGE UP (ref 0–6)
GLUCOSE SERPL-MCNC: 104 MG/DL — HIGH (ref 70–99)
GLUCOSE SERPL-MCNC: 104 MG/DL — HIGH (ref 70–99)
GLUCOSE UR QL: NEGATIVE MG/DL — SIGNIFICANT CHANGE UP
GLUCOSE UR QL: NEGATIVE MG/DL — SIGNIFICANT CHANGE UP
HCT VFR BLD CALC: 44 % — SIGNIFICANT CHANGE UP (ref 39–50)
HCT VFR BLD CALC: 44 % — SIGNIFICANT CHANGE UP (ref 39–50)
HGB BLD-MCNC: 14.5 G/DL — SIGNIFICANT CHANGE UP (ref 13–17)
HGB BLD-MCNC: 14.5 G/DL — SIGNIFICANT CHANGE UP (ref 13–17)
IMM GRANULOCYTES NFR BLD AUTO: 0.8 % — SIGNIFICANT CHANGE UP (ref 0–0.9)
IMM GRANULOCYTES NFR BLD AUTO: 0.8 % — SIGNIFICANT CHANGE UP (ref 0–0.9)
KETONES UR-MCNC: NEGATIVE MG/DL — SIGNIFICANT CHANGE UP
KETONES UR-MCNC: NEGATIVE MG/DL — SIGNIFICANT CHANGE UP
LEUKOCYTE ESTERASE UR-ACNC: NEGATIVE — SIGNIFICANT CHANGE UP
LEUKOCYTE ESTERASE UR-ACNC: NEGATIVE — SIGNIFICANT CHANGE UP
LYMPHOCYTES # BLD AUTO: 2.94 K/UL — SIGNIFICANT CHANGE UP (ref 1–3.3)
LYMPHOCYTES # BLD AUTO: 2.94 K/UL — SIGNIFICANT CHANGE UP (ref 1–3.3)
LYMPHOCYTES # BLD AUTO: 22.5 % — SIGNIFICANT CHANGE UP (ref 13–44)
LYMPHOCYTES # BLD AUTO: 22.5 % — SIGNIFICANT CHANGE UP (ref 13–44)
MCHC RBC-ENTMCNC: 30.9 PG — SIGNIFICANT CHANGE UP (ref 27–34)
MCHC RBC-ENTMCNC: 30.9 PG — SIGNIFICANT CHANGE UP (ref 27–34)
MCHC RBC-ENTMCNC: 33 GM/DL — SIGNIFICANT CHANGE UP (ref 32–36)
MCHC RBC-ENTMCNC: 33 GM/DL — SIGNIFICANT CHANGE UP (ref 32–36)
MCV RBC AUTO: 93.6 FL — SIGNIFICANT CHANGE UP (ref 80–100)
MCV RBC AUTO: 93.6 FL — SIGNIFICANT CHANGE UP (ref 80–100)
MONOCYTES # BLD AUTO: 0.91 K/UL — HIGH (ref 0–0.9)
MONOCYTES # BLD AUTO: 0.91 K/UL — HIGH (ref 0–0.9)
MONOCYTES NFR BLD AUTO: 7 % — SIGNIFICANT CHANGE UP (ref 2–14)
MONOCYTES NFR BLD AUTO: 7 % — SIGNIFICANT CHANGE UP (ref 2–14)
NEUTROPHILS # BLD AUTO: 8.9 K/UL — HIGH (ref 1.8–7.4)
NEUTROPHILS # BLD AUTO: 8.9 K/UL — HIGH (ref 1.8–7.4)
NEUTROPHILS NFR BLD AUTO: 68.2 % — SIGNIFICANT CHANGE UP (ref 43–77)
NEUTROPHILS NFR BLD AUTO: 68.2 % — SIGNIFICANT CHANGE UP (ref 43–77)
NITRITE UR-MCNC: NEGATIVE — SIGNIFICANT CHANGE UP
NITRITE UR-MCNC: NEGATIVE — SIGNIFICANT CHANGE UP
NRBC # BLD: 0 /100 WBCS — SIGNIFICANT CHANGE UP (ref 0–0)
NRBC # BLD: 0 /100 WBCS — SIGNIFICANT CHANGE UP (ref 0–0)
PH UR: 5.5 — SIGNIFICANT CHANGE UP (ref 5–8)
PH UR: 5.5 — SIGNIFICANT CHANGE UP (ref 5–8)
PLATELET # BLD AUTO: 306 K/UL — SIGNIFICANT CHANGE UP (ref 150–400)
PLATELET # BLD AUTO: 306 K/UL — SIGNIFICANT CHANGE UP (ref 150–400)
POTASSIUM SERPL-MCNC: 4 MMOL/L — SIGNIFICANT CHANGE UP (ref 3.5–5.3)
POTASSIUM SERPL-MCNC: 4 MMOL/L — SIGNIFICANT CHANGE UP (ref 3.5–5.3)
POTASSIUM SERPL-SCNC: 4 MMOL/L — SIGNIFICANT CHANGE UP (ref 3.5–5.3)
POTASSIUM SERPL-SCNC: 4 MMOL/L — SIGNIFICANT CHANGE UP (ref 3.5–5.3)
PROT UR-MCNC: NEGATIVE MG/DL — SIGNIFICANT CHANGE UP
PROT UR-MCNC: NEGATIVE MG/DL — SIGNIFICANT CHANGE UP
RBC # BLD: 4.7 M/UL — SIGNIFICANT CHANGE UP (ref 4.2–5.8)
RBC # BLD: 4.7 M/UL — SIGNIFICANT CHANGE UP (ref 4.2–5.8)
RBC # FLD: 14.1 % — SIGNIFICANT CHANGE UP (ref 10.3–14.5)
RBC # FLD: 14.1 % — SIGNIFICANT CHANGE UP (ref 10.3–14.5)
RBC CASTS # UR COMP ASSIST: 3 /HPF — SIGNIFICANT CHANGE UP (ref 0–4)
RBC CASTS # UR COMP ASSIST: 3 /HPF — SIGNIFICANT CHANGE UP (ref 0–4)
SODIUM SERPL-SCNC: 140 MMOL/L — SIGNIFICANT CHANGE UP (ref 135–145)
SODIUM SERPL-SCNC: 140 MMOL/L — SIGNIFICANT CHANGE UP (ref 135–145)
SP GR SPEC: 1.02 — SIGNIFICANT CHANGE UP (ref 1–1.03)
SP GR SPEC: 1.02 — SIGNIFICANT CHANGE UP (ref 1–1.03)
SQUAMOUS # UR AUTO: 1 /HPF — SIGNIFICANT CHANGE UP (ref 0–5)
SQUAMOUS # UR AUTO: 1 /HPF — SIGNIFICANT CHANGE UP (ref 0–5)
UROBILINOGEN FLD QL: 0.2 MG/DL — SIGNIFICANT CHANGE UP (ref 0.2–1)
UROBILINOGEN FLD QL: 0.2 MG/DL — SIGNIFICANT CHANGE UP (ref 0.2–1)
WBC # BLD: 13.04 K/UL — HIGH (ref 3.8–10.5)
WBC # BLD: 13.04 K/UL — HIGH (ref 3.8–10.5)
WBC # FLD AUTO: 13.04 K/UL — HIGH (ref 3.8–10.5)
WBC # FLD AUTO: 13.04 K/UL — HIGH (ref 3.8–10.5)
WBC UR QL: 0 /HPF — SIGNIFICANT CHANGE UP (ref 0–5)
WBC UR QL: 0 /HPF — SIGNIFICANT CHANGE UP (ref 0–5)

## 2023-12-20 PROCEDURE — 85025 COMPLETE CBC W/AUTO DIFF WBC: CPT

## 2023-12-20 PROCEDURE — 81001 URINALYSIS AUTO W/SCOPE: CPT

## 2023-12-20 PROCEDURE — 36415 COLL VENOUS BLD VENIPUNCTURE: CPT

## 2023-12-20 PROCEDURE — 99284 EMERGENCY DEPT VISIT MOD MDM: CPT | Mod: 25

## 2023-12-20 PROCEDURE — 99284 EMERGENCY DEPT VISIT MOD MDM: CPT

## 2023-12-20 PROCEDURE — 74176 CT ABD & PELVIS W/O CONTRAST: CPT | Mod: 26,MA

## 2023-12-20 PROCEDURE — 74176 CT ABD & PELVIS W/O CONTRAST: CPT | Mod: MA

## 2023-12-20 PROCEDURE — 80048 BASIC METABOLIC PNL TOTAL CA: CPT

## 2023-12-20 NOTE — ED ADULT TRIAGE NOTE - CHIEF COMPLAINT QUOTE
+Hematuria since this morning  +Mild general abd discomfort today  PMH R atrial ablation 2 years ago for afib, on eliquis- under care of Rajat Monsalve, in pain management on lyrica

## 2023-12-20 NOTE — ED ADULT NURSE NOTE - NSFALLUNIVINTERV_ED_ALL_ED
Bed/Stretcher in lowest position, wheels locked, appropriate side rails in place/Call bell, personal items and telephone in reach/Instruct patient to call for assistance before getting out of bed/chair/stretcher/Non-slip footwear applied when patient is off stretcher/[place] to call system/Physically safe environment - no spills, clutter or unnecessary equipment/Purposeful proactive rounding/Room/bathroom lighting operational, light cord in reach Bed/Stretcher in lowest position, wheels locked, appropriate side rails in place/Call bell, personal items and telephone in reach/Instruct patient to call for assistance before getting out of bed/chair/stretcher/Non-slip footwear applied when patient is off stretcher/[place] to call system/Physically safe environment - no spills, clutter or unnecessary equipment/Purposeful proactive rounding/Room/bathroom lighting operational, light cord in reach

## 2023-12-20 NOTE — ED PROVIDER NOTE - PATIENT PORTAL LINK FT
You can access the FollowMyHealth Patient Portal offered by [facility] by registering at the following website: [email or web address]. By joining [facility]’s FollowMyHealth portal, you will also be able to view your health information using other applications (apps) compatible with our system. You can access the FollowMyHealth Patient Portal offered by [facility] by registering at the following website: [email or web address]. By joining [facility]’s FollowMyHealth portal, you will also be able to view your health information using other applications (apps) compatible with our system.

## 2023-12-20 NOTE — ED PROVIDER NOTE - ATTENDING APP SHARED VISIT CONTRIBUTION OF CARE
68 M pmh afib s/p ablation on eliquis, R hip AVN and neuropathy p/w hematuria since this morning.  pt reports gross hematuria x 2 episodes this morning, now pinkish in color.  denies any abd pain or back pain.  reports decreased sensation in genitalia 2/2 previous epidurals so unsure of any dysuria, but denies frequency.  denies f/c, HA, dizziness, fainting, cp/sob, trauma    on exam, vss, well appearing, abd soft/nt, no cvat.  urine cup at bedside pinkish tinged. ?uti vs less likely renal stone or bladder mass.  will obtain labs and ua, consider ct pending rst    labs w/ leukocytosis, no systemic sxs.  ua + blood no infection.  ct a/p shows no acute pathology.  pt has [place] at [facility] [place], will f/u there.  discussed strict return parameters    Addendum to attending statement: I have reviewed the ACP note and agree with the history, exam, and plan of care. I  was available to PA   as a supervising provider if needed. PA given opportunity to ask questions and request further evaluation / care.    Pt appears well in nad. no cva tenderness, vitals stable, labs and CT ordered - with no stone on ct .  pt states urine has now cleared and no gross hematuria.  Pt understands dc instructions and follow up. 68 M pmh afib s/p ablation on eliquis, R hip AVN and neuropathy p/w hematuria since this morning.  pt reports gross hematuria x 2 episodes this morning, now pinkish in color.  denies any abd pain or back pain.  reports decreased sensation in genitalia 2/2 previous epidurals so unsure of any dysuria, but denies frequency.  denies f/c, HA, dizziness, fainting, cp/sob, trauma    on exam, vss, well appearing, abd soft/nt, no cvat.  urine cup at bedside pinkish tinged. ?uti vs less likely renal stone or bladder mass.  will obtain labs and ua, consider ct pending rst    labs w/ leukocytosis, no systemic sxs.  ua + blood no infection.  ct a/p shows no acute pathology.  pt has [place] at [facility] [place], will f[place]u there.  discussed strict return parameters    Addendum to attending statement: I have reviewed the ACP note and agree with the history, exam, and plan of care. I  was available to PA   as a supervising provider if needed. PA given opportunity to ask questions and request further evaluation / care.    Pt appears well in nad. no cva tenderness, vitals stable, labs and CT ordered - with no stone on ct .  pt states urine has now cleared and no gross hematuria.  Pt understands dc instructions and follow up.

## 2023-12-20 NOTE — ED PROVIDER NOTE - CLINICAL SUMMARY MEDICAL DECISION MAKING FREE TEXT BOX
68 M pmh afib s/p ablation on eliquis, R hip AVN and neuropathy p/w hematuria since this morning x2 episodes now pinkish in color.  denies any abd pain or flank pain or frequency.  on exam, vss, well appearing, abd soft/nt, no cvat.  urine cup at bedside pinkish tinged. ?uti vs less likely renal stone or bladder mass.  will obtain labs and ua, consider ct pending rst    labs w/ leukocytosis, no systemic sxs.  ua + blood no infection.  ct a/p shows no acute pathology.  pt has uro at [facility] [place], will f/u there.  discussed strict return parameters 68 M pmh afib s/p ablation on eliquis, R hip AVN and neuropathy p/w hematuria since this morning x2 episodes now pinkish in color.  denies any abd pain or flank pain or frequency.  on exam, vss, well appearing, abd soft/nt, no cvat.  urine cup at bedside pinkish tinged. ?uti vs less likely renal stone or bladder mass.  will obtain labs and ua, consider ct pending rst    labs w/ leukocytosis, no systemic sxs.  ua + blood no infection.  ct a/p shows no acute pathology.  pt has uro at [facility] [place], will f/u there.  discussed strict return parameters

## 2023-12-20 NOTE — ED PROVIDER NOTE - PHYSICAL EXAMINATION
Vitals reviewed  Gen: well appearing, nad, speaking in full sentences  Skin: wwp, no rash/lesions  HEENT: ncat, eomi, mmm  CV: rrr, no audible m/r/g  Resp: symmetrical expansion, ctab, no w/r/r  Abd: nondistended, soft, nontender, no r/g, no cvat   Ext: FROM throughout, no peripheral edema  Neuro: alert/oriented, no focal deficits, steady gait

## 2023-12-20 NOTE — ED PROVIDER NOTE - OBJECTIVE STATEMENT
68 M pmh afib s/p ablation on eliquis, R hip AVN and neuropathy p/w hematuria since this morning.  pt reports gross hematuria x 2 episodes this morning, now pinkish in color.  denies any abd pain or back pain.  reports decreased sensation in genitalia 2/2 previous epidurals so unsure of any dysuria, but denies frequency.  denies f/c, HA, dizziness, fainting, cp/sob, trauma

## 2023-12-20 NOTE — ED PROVIDER NOTE - NSFOLLOWUPINSTRUCTIONS_ED_ALL_ED_FT
Please continue all home medications     Follow up with your urologist at [place] in one week    Hematuria, Adult    Hematuria is blood in the urine. Blood may be visible in the urine, or it may be identified with a test. This condition can be caused by infections of the bladder, urethra, kidney, or prostate. Other possible causes include:  Kidney stones.  Cancer of the urinary tract.  Too much calcium in the urine.  Conditions that are passed from parent to child (inherited conditions).  Exercise that requires a lot of energy.  Infections can usually be treated with medicine, and a kidney stone usually will pass through your urine. If neither of these is the cause of your hematuria, more tests may be needed to identify the cause of your symptoms.    It is very important to tell your health care provider about any blood in your urine, even if it is painless or the blood stops without treatment. Blood in the urine, when it happens and then stops and then happens again, can be a symptom of a very serious condition, including cancer. There is no pain in the initial stages of many urinary cancers.    Follow these instructions at home:  Medicines    Take over-the-counter and prescription medicines only as told by your health care provider.  If you were prescribed an antibiotic medicine, take it as told by your health care provider. Do not stop taking the antibiotic even if you start to feel better.  Eating and drinking    Drink enough fluid to keep your urine pale yellow. It is recommended that you drink 3–4 quarts (2.8–3.8 L) a day. If you have been diagnosed with an infection, drinking cranberry juice in addition to large amounts of water is recommended.  Avoid caffeine, tea, and carbonated beverages. These tend to irritate the bladder.  Avoid alcohol because it may irritate the prostate (in males).  General instructions    If you have been diagnosed with a kidney stone, follow your health care provider's instructions about straining your urine to catch the stone.  Empty your bladder often. Avoid holding urine for long periods of time.  If you are female:  After a bowel movement, wipe from front to back and use each piece of toilet paper only once.  Empty your bladder before and after sex.  Pay attention to any changes in your symptoms. Tell your health care provider about any changes or any new symptoms.  It is up to you to get the results of any tests. Ask your health care provider, or the department that is doing the test, when your results will be ready.  Keep all follow-up visits. This is important.  Contact a health care provider if:  You develop back pain.  You have a fever or chills.  You have nausea or vomiting.  Your symptoms do not improve after 3 days.  Your symptoms get worse.  Get help right away if:  You develop severe vomiting and are unable to take medicine without vomiting.  You develop severe pain in your back or abdomen even though you are taking medicine.  You pass a large amount of blood in your urine.  You pass blood clots in your urine.  You feel very weak or like you might faint.  You faint.  Summary  Hematuria is blood in the urine. It has many possible causes.  It is very important that you tell your health care provider about any blood in your urine, even if it is painless or the blood stops without treatment.  Take over-the-counter and prescription medicines only as told by your health care provider.  Drink enough fluid to keep your urine pale yellow.  This information is not intended to replace advice given to you by your health care provider. Make sure you discuss any questions you have with your health care provider. Please continue all home medications     Follow up with your urologist at [place] in one week    Hematuria, Adult    Hematuria is blood in the urine. Blood may be visible in the urine, or it may be identified with a test. This condition can be caused by infections of the bladder, urethra, kidney, or prostate. Other possible causes include:  Kidney stones.  Cancer of the urinary tract.  Too much calcium in the urine.  Conditions that are passed from parent to child (inherited conditions).  Exercise that requires a lot of energy.  Infections can usually be treated with medicine, and a kidney stone usually will pass through your urine. If neither of these is the cause of your hematuria, more tests may be needed to identify the cause of your symptoms.    It is very important to tell your health care provider about any blood in your urine, even if it is painless or the blood stops without treatment. Blood in the urine, when it happens and then stops and then happens again, can be a symptom of a very serious condition, including cancer. There is no pain in the initial stages of many urinary cancers.    Follow these instructions at home:  Medicines    Take over-the-counter and prescription medicines only as told by your health care provider.  If you were prescribed an antibiotic medicine, take it as told by your health care provider. Do not stop taking the antibiotic even if you start to feel better.  Eating and drinking    Drink enough fluid to keep your urine pale yellow. It is recommended that you drink 3–4 quarts (2.8–3.8 L) a day. If you have been diagnosed with an infection, drinking cranberry juice in addition to large amounts of water is recommended.  Avoid caffeine, tea, and carbonated beverages. These tend to irritate the bladder.  Avoid alcohol because it may irritate the prostate (in males).  General instructions    If you have been diagnosed with a kidney stone, follow your health care provider's instructions about straining your urine to catch the stone.  Empty your bladder often. Avoid holding urine for long periods of time.  If you are female:  After a bowel movement, wipe from front to back and use each piece of toilet paper only once.  Empty your bladder before and after sex.  Pay attention to any changes in your symptoms. Tell your health care provider about any changes or any new symptoms.  It is up to you to get the results of any tests. Ask your health care provider, or the department that is doing the test, when your results will be ready.  Keep all follow-up visits. This is important.  Contact a health care provider if:  You develop back pain.  You have a fever or chills.  You have nausea or vomiting.  Your symptoms do not improve after 3 days.  Your symptoms get worse.  Get help right away if:  You develop severe vomiting and are unable to take medicine without vomiting.  You develop severe pain in your back or abdomen even though you are taking medicine.  You pass a large amount of blood in your urine.  You pass blood clots in your urine.  You feel very weak or like you might faint.  You faint.  Summary  Hematuria is blood in the urine. It has many possible causes.  It is very important that you tell your health care provider about any blood in your urine, even if it is painless or the blood stops without treatment.  Take over-the-counter and prescription medicines only as told by your health care provider.  Drink enough fluid to keep your urine pale yellow.  This information is not intended to replace advice given to you by your health care provider. Make sure you discuss any questions you have with your health care provider.

## 2023-12-20 NOTE — ED PROVIDER NOTE - NS ED ATTENDING STATEMENT MOD
This was a shared visit with the [facility]. I reviewed and verified the documentation and independently performed the documented:

## 2023-12-20 NOTE — ED ADULT NURSE NOTE - OBJECTIVE STATEMENT
Pt alert, oriented, and ambulatory at time of assessment. Pt denies fevers or chills. Reports hematuria beginning this morning without painful urination or difficulty urinated. Pt denies chest pain, SOB, dizziness, weakness, or syncope.

## 2023-12-21 ENCOUNTER — APPOINTMENT (OUTPATIENT)
Dept: UROLOGY | Facility: CLINIC | Age: 68
End: 2023-12-21
Payer: MEDICARE

## 2023-12-21 VITALS
HEIGHT: 70 IN | SYSTOLIC BLOOD PRESSURE: 126 MMHG | TEMPERATURE: 97.2 F | DIASTOLIC BLOOD PRESSURE: 81 MMHG | BODY MASS INDEX: 35.79 KG/M2 | OXYGEN SATURATION: 97 % | HEART RATE: 52 BPM | WEIGHT: 250 LBS

## 2023-12-21 DIAGNOSIS — Z00.00 ENCOUNTER FOR GENERAL ADULT MEDICAL EXAMINATION W/OUT ABNORMAL FINDINGS: ICD-10-CM

## 2023-12-21 DIAGNOSIS — Z86.79 PERSONAL HISTORY OF OTHER DISEASES OF THE CIRCULATORY SYSTEM: ICD-10-CM

## 2023-12-21 PROCEDURE — 99214 OFFICE O/P EST MOD 30 MIN: CPT

## 2023-12-21 NOTE — ASSESSMENT
[FreeTextEntry1] : Impression: 67 y/o M with gross hematuria.   Plan: -Urine-UA/Cx, cytology -PSA -CT-urogram -Cystoscopy -Continue doxazosin -F/u with Urologist for cystoscopy after CT -Patient instructed to obtain records from [facility] -Can continue care with previous urologist Dr Yee. He does not wish to return to [place]  Advised to call office or go to ED if gross hematuria with clots, urinary retention, fever or chills.

## 2023-12-21 NOTE — HISTORY OF PRESENT ILLNESS
[FreeTextEntry1] : 69y/o M seen at [facility] ED on 12/20/2023 for gross hematuria. Reports voiding 1 quart of bright red blood accompanied by abdominal cramping and formed non-bloody stool. States today he had 1 episode of hematuria in the morning which has since resolved. He denies frequency, clots, dysuria, burning, straining, fever, chills, flank/suprapubic pain or trauma. He states he was seen by Urologist [facility] Y[facility] for a routine visit in 2002 and was found to have microscopic hematuria. A cystoscopy was done, and findings were normal. He was last seen by Urologist Taiwo 1.5 years ago at [facility]. States his PSA was normal at that time. He reports taking Flomax in the past and is now taking Doxazosin. He denies weak flow or incomplete emptying. Denies history of BPH, kidney stones or any other urinary disorders.   PMH-Rhabdomyolysis(2023), stage 3 AVN, benign bowel tumor, umbilical hernia, inguinal hernia, depression, HTN, skin grafts to b/l LE(1982), A-fib s/p ablation(2021) PSH-Stem cell transplant, lumbar sympathetic block, meniscus repair, inguinal hernia repair(1990s), loop recorder  FH-heart disease, Lung CA(maternal grandmother) SocHx-Former 1 ppd smoker x 24 years-quit at age 40, Hx of alcohol abuse-quit 35 years ago Meds-Lisinopril, HCTZ, Simvastatin, Metoprolol, Eliquis, Lyrica, Famotidine, Doxazosin, Cyclobenzaprine, Allopurinol Allergies-Zyprexa(Rhabdomyolysis), Haldol(anaphylaxis), Neuroleptics  Labs(12/20/2023) Red Blood Cell - Urine	3/HPF	 Creatinine, Serum-	0.97mg/dL eGFR-	85  mL/min/1.73m2 Blood Urea Nitrogen, Serum-	21mg/dL WBC Count-	13.04High K/uL  CT ABD/PEL(12/20/2023) IMPRESSION: No evidence of urolithiasis or hydronephrosis.

## 2023-12-21 NOTE — PHYSICAL EXAM
[General Appearance - Well Developed] : well developed [General Appearance - Well Nourished] : well nourished [Normal Appearance] : normal appearance [Well Groomed] : well groomed [General Appearance - In No Acute Distress] : no acute distress [Heart Rate And Rhythm] : heart rate and rhythm were normal [] : no respiratory distress [Exaggerated Use Of Accessory Muscles For Inspiration] : no accessory muscle use [Abdomen Soft] : soft [Abdomen Tenderness] : non-tender [Normal Station and Gait] : the gait and station were normal for the patient's age [Skin Color & Pigmentation] : normal skin color and pigmentation [No Focal Deficits] : no focal deficits [Oriented To Time, Place, And Person] : oriented to person, place, and time [Not Anxious] : not anxious [Costovertebral Angle Tenderness] : no ~M costovertebral angle tenderness [de-identified] : Flat affect

## 2023-12-22 ENCOUNTER — TRANSCRIPTION ENCOUNTER (OUTPATIENT)
Age: 68
End: 2023-12-22

## 2023-12-22 LAB
APPEARANCE: ABNORMAL
BACTERIA: NEGATIVE /HPF
BILIRUBIN URINE: NEGATIVE
BLOOD URINE: ABNORMAL
CAST: 0 /LPF
COLOR: NORMAL
EPITHELIAL CELLS: 8 /HPF
GLUCOSE QUALITATIVE U: NEGATIVE MG/DL
KETONES URINE: NEGATIVE MG/DL
LEUKOCYTE ESTERASE URINE: NEGATIVE
MICROSCOPIC-UA: NORMAL
NITRITE URINE: NEGATIVE
PH URINE: 5
PROTEIN URINE: NEGATIVE MG/DL
PSA FREE FLD-MCNC: 20 %
PSA FREE SERPL-MCNC: 0.39 NG/ML
PSA SERPL-MCNC: 1.9 NG/ML
RED BLOOD CELLS URINE: 97 /HPF
SPECIFIC GRAVITY URINE: 1.02
UROBILINOGEN URINE: 0.2 MG/DL
WHITE BLOOD CELLS URINE: 1 /HPF

## 2023-12-26 LAB
BACTERIA UR CULT: NORMAL
URINE CYTOLOGY: NORMAL

## 2023-12-29 ENCOUNTER — NON-APPOINTMENT (OUTPATIENT)
Age: 68
End: 2023-12-29

## 2023-12-29 ENCOUNTER — APPOINTMENT (OUTPATIENT)
Dept: HEART AND VASCULAR | Facility: CLINIC | Age: 68
End: 2023-12-29
Payer: MEDICARE

## 2023-12-29 PROCEDURE — 93298 REM INTERROG DEV EVAL SCRMS: CPT

## 2023-12-29 PROCEDURE — G2066: CPT

## 2024-01-11 ENCOUNTER — APPOINTMENT (OUTPATIENT)
Dept: CT IMAGING | Facility: HOSPITAL | Age: 69
End: 2024-01-11

## 2024-01-11 ENCOUNTER — OUTPATIENT (OUTPATIENT)
Dept: OUTPATIENT SERVICES | Facility: HOSPITAL | Age: 69
LOS: 1 days | End: 2024-01-11
Payer: MEDICARE

## 2024-01-11 DIAGNOSIS — K46.9 UNSPECIFIED ABDOMINAL HERNIA WITHOUT OBSTRUCTION OR GANGRENE: Chronic | ICD-10-CM

## 2024-01-11 DIAGNOSIS — Z98.890 OTHER SPECIFIED POSTPROCEDURAL STATES: Chronic | ICD-10-CM

## 2024-01-11 DIAGNOSIS — T24.309A BURN OF THIRD DEGREE OF UNSPECIFIED SITE OF UNSPECIFIED LOWER LIMB, EXCEPT ANKLE AND FOOT, INITIAL ENCOUNTER: Chronic | ICD-10-CM

## 2024-01-11 DIAGNOSIS — Z41.9 ENCOUNTER FOR PROCEDURE FOR PURPOSES OTHER THAN REMEDYING HEALTH STATE, UNSPECIFIED: Chronic | ICD-10-CM

## 2024-01-11 DIAGNOSIS — T14.8 OTHER INJURY OF UNSPECIFIED BODY REGION: Chronic | ICD-10-CM

## 2024-01-11 PROBLEM — R31.0 GROSS HEMATURIA: Status: ACTIVE | Noted: 2023-12-21

## 2024-01-11 PROBLEM — N35.914 ANTERIOR URETHRAL STRICTURE: Status: ACTIVE | Noted: 2024-01-11

## 2024-01-11 PROBLEM — R39.9 LOWER URINARY TRACT SYMPTOMS (LUTS): Status: ACTIVE | Noted: 2023-12-21

## 2024-01-11 PROBLEM — Z87.898 HISTORY OF SYNCOPE: Status: RESOLVED | Noted: 2021-10-14 | Resolved: 2024-01-11

## 2024-01-11 PROCEDURE — 74178 CT ABD&PLV WO CNTR FLWD CNTR: CPT | Mod: 26

## 2024-01-11 PROCEDURE — 82565 ASSAY OF CREATININE: CPT

## 2024-01-11 PROCEDURE — 74178 CT ABD&PLV WO CNTR FLWD CNTR: CPT

## 2024-01-12 ENCOUNTER — APPOINTMENT (OUTPATIENT)
Dept: UROLOGY | Facility: CLINIC | Age: 69
End: 2024-01-12
Payer: MEDICARE

## 2024-01-12 DIAGNOSIS — M87.059 IDIOPATHIC ASEPTIC NECROSIS OF UNSPECIFIED FEMUR: ICD-10-CM

## 2024-01-12 DIAGNOSIS — G57.31 LESION OF LATERAL POPLITEAL NERVE, RIGHT LOWER LIMB: ICD-10-CM

## 2024-01-12 DIAGNOSIS — R39.9 UNSPECIFIED SYMPTOMS AND SIGNS INVOLVING THE GENITOURINARY SYSTEM: ICD-10-CM

## 2024-01-12 DIAGNOSIS — Z87.898 PERSONAL HISTORY OF OTHER SPECIFIED CONDITIONS: ICD-10-CM

## 2024-01-12 DIAGNOSIS — R31.0 GROSS HEMATURIA: ICD-10-CM

## 2024-01-12 DIAGNOSIS — N35.914 UNSPECIFIED ANTERIOR URETHRAL STRICTURE, MALE: ICD-10-CM

## 2024-01-12 PROCEDURE — 52281 CYSTOSCOPY AND TREATMENT: CPT

## 2024-01-12 PROCEDURE — 99203 OFFICE O/P NEW LOW 30 MIN: CPT | Mod: 25

## 2024-01-12 PROCEDURE — 51798 US URINE CAPACITY MEASURE: CPT

## 2024-01-12 RX ORDER — BISMUTH TRIBROMOPH/PETROLATUM 5"X9"
BANDAGE TOPICAL
Qty: 1 | Refills: 0 | Status: DISCONTINUED | COMMUNITY
Start: 2020-08-28 | End: 2024-01-12

## 2024-01-12 RX ORDER — SULFAMETHOXAZOLE AND TRIMETHOPRIM 800; 160 MG/1; MG/1
800-160 TABLET ORAL TWICE DAILY
Qty: 14 | Refills: 0 | Status: DISCONTINUED | COMMUNITY
Start: 2021-08-02 | End: 2024-01-12

## 2024-01-12 RX ORDER — OXYCODONE AND ACETAMINOPHEN 5; 325 MG/1; MG/1
5-325 TABLET ORAL
Qty: 20 | Refills: 0 | Status: DISCONTINUED | COMMUNITY
Start: 2020-07-20 | End: 2024-01-12

## 2024-01-12 RX ORDER — CIPROFLOXACIN HYDROCHLORIDE 500 MG/1
500 TABLET, FILM COATED ORAL
Qty: 20 | Refills: 0 | Status: DISCONTINUED | COMMUNITY
Start: 2022-11-04 | End: 2024-01-12

## 2024-01-12 RX ORDER — AMOXICILLIN AND CLAVULANATE POTASSIUM 875; 125 MG/1; MG/1
875-125 TABLET, COATED ORAL
Qty: 14 | Refills: 0 | Status: DISCONTINUED | COMMUNITY
Start: 2018-12-28 | End: 2024-01-12

## 2024-01-12 RX ORDER — TRIAMCINOLONE ACETONIDE 1 MG/G
0.1 CREAM TOPICAL TWICE DAILY
Qty: 80 | Refills: 0 | Status: DISCONTINUED | COMMUNITY
Start: 2017-05-19 | End: 2024-01-12

## 2024-01-12 RX ORDER — OMEPRAZOLE 20 MG/1
20 CAPSULE, DELAYED RELEASE ORAL
Refills: 0 | Status: DISCONTINUED | COMMUNITY
End: 2024-01-12

## 2024-01-12 RX ORDER — OXYCODONE AND ACETAMINOPHEN 5; 325 MG/1; MG/1
5-325 TABLET ORAL
Qty: 20 | Refills: 0 | Status: DISCONTINUED | COMMUNITY
Start: 2020-08-20 | End: 2024-01-12

## 2024-01-12 NOTE — PHYSICAL EXAM
[General Appearance - Well Developed] : well developed [General Appearance - Well Nourished] : well nourished [Normal Appearance] : normal appearance [Well Groomed] : well groomed [General Appearance - In No Acute Distress] : no acute distress [Edema] : no peripheral edema [Respiration, Rhythm And Depth] : normal respiratory rhythm and effort [Exaggerated Use Of Accessory Muscles For Inspiration] : no accessory muscle use [Abdomen Soft] : soft [Abdomen Tenderness] : non-tender [Abdomen Mass (___ Cm)] : no abdominal mass palpated [Abdomen Hernia] : no hernia was discovered [Costovertebral Angle Tenderness] : no ~M costovertebral angle tenderness [Urethral Meatus] : meatus normal [Urinary Bladder Findings] : the bladder was normal on palpation [Epididymis] : the epididymides were normal [Testes Tenderness] : no tenderness of the testes [Testes Mass (___cm)] : there were no testicular masses [Prostate Tenderness] : the prostate was not tender [No Prostate Nodules] : no prostate nodules [Normal Station and Gait] : the gait and station were normal for the patient's age [] : no rash [No Focal Deficits] : no focal deficits [Oriented To Time, Place, And Person] : oriented to person, place, and time [Affect] : the affect was normal [Mood] : the mood was normal [Inguinal Lymph Nodes Enlarged Bilaterally] : inguinal

## 2024-01-12 NOTE — ASSESSMENT
[FreeTextEntry1] : I discussed the findings and options with . CELSO FUNES in detail and reviewed the available records including the recent CT scan.  Fortunately, today's cystoscopy did not identify any lower tract etiology for the hematuria.  The proximal bulbar urethral stricture is inconsequential.  Going forward, Mr. Funes will simply monitor his progress and follow-up with Dr. Car.  He understands that he should have a PSA and rectal exam annually.

## 2024-01-12 NOTE — LETTER BODY
[Dear  ___] : Dear  [unfilled], [Consult Letter:] : I had the pleasure of evaluating your patient, [unfilled]. [Please see my note below.] : Please see my note below. [Consult Closing:] : Thank you very much for allowing me to participate in the care of this patient.  If you have any questions, please do not hesitate to contact me. [Sincerely,] : Sincerely, [FreeTextEntry3] : Vinay Yee MD, FACS

## 2024-01-12 NOTE — HISTORY OF PRESENT ILLNESS
[FreeTextEntry1] : Mr. CELSO FUNES comes in today for a urologic evaluation.  I last saw the patient in 2003 and he was seen by Ms. Choudhary urgently in December 2023.  He was sent to the [facility] office after being seen in the [facility] ER where he presented with gross hematuria associated with abdominal cramping, both of which have since resolved.  There is no history of urolithiasis. A subsequent urine culture and cytology were negative.  Mr. Funes underwent a cystoscopy in May 2000 and again in January 2003 for evaluation of microscopic hematuria.  The only finding on both of those procedures was a thin bulbar stricture which was not felt to be functionally significant.  From his general urologic history Mr. Funes reports stable moderate lower urinary tract symptoms and is continuing on doxazosin (having been on Flomax previously). IPSS: 6/3 Sono (performed to assess bladder emptying): Nil PVR  There is no family history of prostate cancer.  PSAs:  12/29/23--1.9; 4/4/18--0.64; 10/5/15--0.82  Renal sono:  1/23/03--Normal renal sonogram  CT abd/pelvis:  1/11/24--Normal urologic findings.  Diverticulosis.

## 2024-01-15 LAB — BACTERIA UR CULT: NORMAL

## 2024-01-19 LAB — URINE CYTOLOGY: NORMAL

## 2024-01-26 RX ORDER — APIXABAN 5 MG/1
5 TABLET, FILM COATED ORAL
Qty: 60 | Refills: 6 | Status: ACTIVE | COMMUNITY
Start: 2024-01-26 | End: 1900-01-01

## 2024-02-09 NOTE — PATIENT PROFILE ADULT - PACKS PER DAY
GOAL: Patient will perform functional transfer independently with use of rolling walker in 4 weeks.
0

## 2024-02-21 ENCOUNTER — APPOINTMENT (OUTPATIENT)
Dept: HEART AND VASCULAR | Facility: CLINIC | Age: 69
End: 2024-02-21

## 2024-03-17 NOTE — ED ADULT NURSE NOTE - SUICIDE SCREENING QUESTION 2
ICU to hospitalist transfer:    Per verbal report from the critical care team:    88 yo female with pmh lung CA, dementia, COPD and alcohol abuse admitted 3/14/24 with sepsis due to cellulitis. Blood cultures NGTD.  Treated with IV cefepime.  She was transiently hypoxic and required bipap, which resolved with diuresis.  Empiric thiamine and folate.  +Cdiff positive on PO vancomycin.     Hospitalist team to resume the patient's care at this time.    Jazz Mi,   Internal medicine / Hospitalist   No

## 2024-03-22 ENCOUNTER — APPOINTMENT (OUTPATIENT)
Dept: HEART AND VASCULAR | Facility: CLINIC | Age: 69
End: 2024-03-22
Payer: MEDICARE

## 2024-03-22 ENCOUNTER — NON-APPOINTMENT (OUTPATIENT)
Age: 69
End: 2024-03-22

## 2024-03-22 PROCEDURE — 93298 REM INTERROG DEV EVAL SCRMS: CPT

## 2024-04-02 NOTE — DISCHARGE NOTE ADULT - NS DC ANGIO PCI YN
Anesthesia Post-op Note    Patient: Chapis Felix  Procedure(s) Performed: RIGHT SHOULDER ARTHROSCOPY WITH DEBRIDEMENT DECOMPRESSION BICEPS TENODESIS  ROTATOR CUFF REPAIR (Right: Shoulder)   Anesthesia type: General    Vitals Value Taken Time   Temp 96.5 04/02/24 1012   Pulse 78 04/02/24 1012   Resp 15 04/02/24 1012   SpO2 95 04/02/24 1012   /83 04/02/24 1012         Patient Location: PACU Phase 1  Post-op Vital Signs:stable  Level of Consciousness: alert, awake, oriented and participates in exam  Respiratory Status: spontaneous ventilation, unassisted and nasal cannula  Cardiovascular blood pressure returned to baseline and stable  Hydration: euvolemic  Pain Management: well controlled  Handoff: Handoff to receiving nurse was performed and questions were answered  Nausea: None  Airway Patency:patent  Post-op Assessment: awake, alert, appropriately conversant, or baseline, no complications, patient tolerated procedure well and no evidence of recall      There were no known notable events for this encounter.                      
no

## 2024-04-26 ENCOUNTER — APPOINTMENT (OUTPATIENT)
Dept: HEART AND VASCULAR | Facility: CLINIC | Age: 69
End: 2024-04-26
Payer: MEDICARE

## 2024-04-26 ENCOUNTER — NON-APPOINTMENT (OUTPATIENT)
Age: 69
End: 2024-04-26

## 2024-04-26 PROCEDURE — 93298 REM INTERROG DEV EVAL SCRMS: CPT

## 2024-05-09 ENCOUNTER — APPOINTMENT (OUTPATIENT)
Dept: HEART AND VASCULAR | Facility: CLINIC | Age: 69
End: 2024-05-09
Payer: MEDICARE

## 2024-05-09 VITALS
TEMPERATURE: 96.3 F | DIASTOLIC BLOOD PRESSURE: 79 MMHG | OXYGEN SATURATION: 96 % | HEART RATE: 60 BPM | HEIGHT: 70 IN | WEIGHT: 258 LBS | SYSTOLIC BLOOD PRESSURE: 124 MMHG | BODY MASS INDEX: 36.94 KG/M2

## 2024-05-09 DIAGNOSIS — I48.0 PAROXYSMAL ATRIAL FIBRILLATION: ICD-10-CM

## 2024-05-09 DIAGNOSIS — I48.92 UNSPECIFIED ATRIAL FLUTTER: ICD-10-CM

## 2024-05-09 PROCEDURE — 93291 INTERROG DEV EVAL SCRMS IP: CPT

## 2024-05-13 PROBLEM — I48.92 ATRIAL FLUTTER: Status: ACTIVE | Noted: 2023-07-03

## 2024-05-13 PROBLEM — I48.0 AF (PAROXYSMAL ATRIAL FIBRILLATION): Status: ACTIVE | Noted: 2024-01-26

## 2024-05-13 NOTE — ADDENDUM
[FreeTextEntry1] : I, Rajat Monsalve, hereby attest that the medical record entry for this patient accurately reflects signatures/notations that I made on the Date of Service in my capacity as an Attending Physician when I treated/diagnosed the above patient. I do hereby attest that this information is true, accurate and complete to the best of my knowledge and I understand that any falsification, omission, or concealment of material fact may subject me to administrative, civil, or, criminal liability. I agree with the note as written by my PA in its entirety. I was present for the entire visit and supervised the entire visit and agree with the plan as outlined.  I, Jurgen Breaux, am scribing for and the presence of Dr. Monsalve the following sections: HPI, PMH,Family/social history, ROS, Physical Exam, Assessment / Plan.

## 2024-05-13 NOTE — PHYSICAL EXAM
[Clean] : clean [Dry] : dry [Well-Healed] : well-healed [Well Developed] : well developed [Well Nourished] : well nourished [No Acute Distress] : no acute distress [Normal S1, S2] : normal S1, S2 [Clear Lung Fields] : clear lung fields [Good Air Entry] : good air entry [No Respiratory Distress] : no respiratory distress  [Abnormal Gait] : abnormal gait [Normal Speech] : normal speech [Normal] : alert and oriented, normal memory [Alert and Oriented] : alert and oriented [Palpable Crepitus] : no palpable crepitus [Bleeding] : no active bleeding [Foul Odor] : no foul smell [Purulent Drainage] : no purulent drainage [Serosanguineous Drainage] : no serosanquineous drainage [Serous Drainage] : no serous drainage [Erythema] : not erythematous [Warm] : not warm [Tender] : not tender [Indurated] : not indurated [Fluctuant] : not fluctuant [Moves all extremities] : moves all extremities

## 2024-05-13 NOTE — REVIEW OF SYSTEMS
[Weight Gain (___ Lbs)] : no recent weight gain [Weight Loss (___ Lbs)] : no recent weight loss [Joint Pain] : joint pain [Fever] : no fever [Chills] : no chills [Feeling Fatigued] : not feeling fatigued [SOB] : no shortness of breath [Dyspnea on exertion] : not dyspnea during exertion [Palpitations] : no palpitations [Syncope] : no syncope [Negative] : Heme/Lymph

## 2024-05-13 NOTE — DISCUSSION/SUMMARY
[FreeTextEntry1] : 69 year old male with HTN, HLD, depression and atrial flutter s/p ablation (typical) 3/2021, with syncope s/p ILR, who presents for follow up.  ILR interrogation with no arrhythmias since his last visit.   He is on oral anticoagulation.  No evidence of scarlett arrhythmias.  Remote monitoring is working.  He will follow up in 6 months or sooner if needed.  He knows to call with any questions or concerns in the interim

## 2024-05-13 NOTE — HISTORY OF PRESENT ILLNESS
[FreeTextEntry1] : 69 year old male with HTN, HLD, depression and atrial flutter s/p ablation (typical) 3/2021, with syncope s/p ILR, who presents for follow up.  Since his last visit he has been doing well from a cardiac standpoint.   He still complains of pains from his degenerative joint disease.  He has had no further syncope.  He is maintained on oral anticoagulation.  No palpitations, chest pain, SOB, near syncope.  He has no device related complaints.

## 2024-05-22 NOTE — HISTORY OF PRESENT ILLNESS
[FreeTextEntry1] : 67 year old male with HTN, HLD, depression and atrial flutter s/p ablation (typical) 3/2021, with syncope s/p ILR, who presents for follow up.\par \par SInce his last visit he has been doing well.  He has had no further syncope,   He is maintained on oral anticoagulation.  No palpitations, chest pain, SOB, near syncope.  He has stable LE edema.  s/p ILR with no device related complaints. \par \par   Detail Level: Detailed Render Risk Assessment In Note?: no Comment: [id number]

## 2024-06-13 ENCOUNTER — APPOINTMENT (OUTPATIENT)
Dept: HEART AND VASCULAR | Facility: CLINIC | Age: 69
End: 2024-06-13
Payer: MEDICARE

## 2024-06-13 ENCOUNTER — NON-APPOINTMENT (OUTPATIENT)
Age: 69
End: 2024-06-13

## 2024-06-13 PROCEDURE — 93298 REM INTERROG DEV EVAL SCRMS: CPT

## 2024-07-08 ENCOUNTER — RX RENEWAL (OUTPATIENT)
Age: 69
End: 2024-07-08

## 2024-07-18 ENCOUNTER — NON-APPOINTMENT (OUTPATIENT)
Age: 69
End: 2024-07-18

## 2024-07-18 ENCOUNTER — APPOINTMENT (OUTPATIENT)
Dept: HEART AND VASCULAR | Facility: CLINIC | Age: 69
End: 2024-07-18
Payer: MEDICARE

## 2024-07-18 PROCEDURE — 93298 REM INTERROG DEV EVAL SCRMS: CPT

## 2024-08-22 ENCOUNTER — APPOINTMENT (OUTPATIENT)
Dept: HEART AND VASCULAR | Facility: CLINIC | Age: 69
End: 2024-08-22
Payer: MEDICARE

## 2024-08-22 ENCOUNTER — NON-APPOINTMENT (OUTPATIENT)
Age: 69
End: 2024-08-22

## 2024-08-22 ENCOUNTER — APPOINTMENT (OUTPATIENT)
Dept: HEART AND VASCULAR | Facility: CLINIC | Age: 69
End: 2024-08-22

## 2024-08-22 PROCEDURE — 93298 REM INTERROG DEV EVAL SCRMS: CPT

## 2024-09-26 ENCOUNTER — APPOINTMENT (OUTPATIENT)
Dept: HEART AND VASCULAR | Facility: CLINIC | Age: 69
End: 2024-09-26
Payer: MEDICARE

## 2024-09-26 ENCOUNTER — NON-APPOINTMENT (OUTPATIENT)
Age: 69
End: 2024-09-26

## 2024-09-26 PROCEDURE — 93298 REM INTERROG DEV EVAL SCRMS: CPT

## 2024-09-26 NOTE — ED ADULT NURSE NOTE - NS ED NURSE REPORT GIVEN DT
Pt called clinic for pain medication refill. He still has ongoing neck pain but states it seems to be improving as time goes on. The pain medication helps particularly with days where he is very active and sore after physical activity. Will refill medication as he continues with recovery.   29-Aug-2019 23:14

## 2024-10-01 ENCOUNTER — RX RENEWAL (OUTPATIENT)
Age: 69
End: 2024-10-01

## 2024-10-16 ENCOUNTER — NON-APPOINTMENT (OUTPATIENT)
Age: 69
End: 2024-10-16

## 2024-11-04 ENCOUNTER — APPOINTMENT (OUTPATIENT)
Dept: VASCULAR SURGERY | Facility: CLINIC | Age: 69
End: 2024-11-04
Payer: MEDICARE

## 2024-11-04 VITALS
WEIGHT: 258 LBS | HEIGHT: 70 IN | DIASTOLIC BLOOD PRESSURE: 80 MMHG | SYSTOLIC BLOOD PRESSURE: 123 MMHG | HEART RATE: 53 BPM | BODY MASS INDEX: 36.94 KG/M2

## 2024-11-04 DIAGNOSIS — L03.115 CELLULITIS OF RIGHT LOWER LIMB: ICD-10-CM

## 2024-11-04 DIAGNOSIS — L97.911 NON-PRESSURE CHRONIC ULCER OF UNSPECIFIED PART OF RIGHT LOWER LEG LIMITED TO BREAKDOWN OF SKIN: ICD-10-CM

## 2024-11-04 PROCEDURE — 99204 OFFICE O/P NEW MOD 45 MIN: CPT

## 2024-11-06 ENCOUNTER — APPOINTMENT (OUTPATIENT)
Dept: HEART AND VASCULAR | Facility: CLINIC | Age: 69
End: 2024-11-06

## 2024-11-06 ENCOUNTER — NON-APPOINTMENT (OUTPATIENT)
Age: 69
End: 2024-11-06

## 2024-11-06 VITALS
DIASTOLIC BLOOD PRESSURE: 69 MMHG | TEMPERATURE: 97.1 F | SYSTOLIC BLOOD PRESSURE: 103 MMHG | OXYGEN SATURATION: 97 % | WEIGHT: 258 LBS | HEART RATE: 55 BPM | BODY MASS INDEX: 36.94 KG/M2 | HEIGHT: 70 IN

## 2024-11-06 DIAGNOSIS — I48.0 PAROXYSMAL ATRIAL FIBRILLATION: ICD-10-CM

## 2024-11-06 PROCEDURE — 93291 INTERROG DEV EVAL SCRMS IP: CPT

## 2024-12-09 ENCOUNTER — APPOINTMENT (OUTPATIENT)
Dept: HEART AND VASCULAR | Facility: CLINIC | Age: 69
End: 2024-12-09

## 2024-12-09 PROCEDURE — 93298 REM INTERROG DEV EVAL SCRMS: CPT

## 2024-12-11 ENCOUNTER — NON-APPOINTMENT (OUTPATIENT)
Age: 69
End: 2024-12-11

## 2024-12-11 RX ORDER — SULFAMETHOXAZOLE AND TRIMETHOPRIM 800; 160 MG/1; MG/1
800-160 TABLET ORAL
Qty: 14 | Refills: 0 | Status: ACTIVE | COMMUNITY
Start: 2024-12-11 | End: 1900-01-01

## 2024-12-23 ENCOUNTER — RX RENEWAL (OUTPATIENT)
Age: 69
End: 2024-12-23

## 2025-01-10 ENCOUNTER — NON-APPOINTMENT (OUTPATIENT)
Age: 70
End: 2025-01-10

## 2025-01-10 ENCOUNTER — APPOINTMENT (OUTPATIENT)
Dept: HEART AND VASCULAR | Facility: CLINIC | Age: 70
End: 2025-01-10
Payer: MEDICARE

## 2025-01-10 PROCEDURE — 93298 REM INTERROG DEV EVAL SCRMS: CPT

## 2025-01-16 ENCOUNTER — APPOINTMENT (OUTPATIENT)
Dept: VASCULAR SURGERY | Facility: CLINIC | Age: 70
End: 2025-01-16

## 2025-01-16 DIAGNOSIS — L03.119 CELLULITIS OF UNSPECIFIED PART OF LIMB: ICD-10-CM

## 2025-01-16 DIAGNOSIS — L97.921 NON-PRESSURE CHRONIC ULCER OF UNSPECIFIED PART OF LEFT LOWER LEG LIMITED TO BREAKDOWN OF SKIN: ICD-10-CM

## 2025-01-16 PROCEDURE — 29580 STRAPPING UNNA BOOT: CPT | Mod: LT

## 2025-01-16 PROCEDURE — 99215 OFFICE O/P EST HI 40 MIN: CPT | Mod: 25

## 2025-02-14 ENCOUNTER — APPOINTMENT (OUTPATIENT)
Dept: HEART AND VASCULAR | Facility: CLINIC | Age: 70
End: 2025-02-14

## 2025-02-14 ENCOUNTER — NON-APPOINTMENT (OUTPATIENT)
Age: 70
End: 2025-02-14

## 2025-02-14 PROCEDURE — 93298 REM INTERROG DEV EVAL SCRMS: CPT

## 2025-03-21 ENCOUNTER — NON-APPOINTMENT (OUTPATIENT)
Age: 70
End: 2025-03-21

## 2025-03-21 ENCOUNTER — APPOINTMENT (OUTPATIENT)
Dept: HEART AND VASCULAR | Facility: CLINIC | Age: 70
End: 2025-03-21
Payer: MEDICARE

## 2025-03-21 PROCEDURE — 93298 REM INTERROG DEV EVAL SCRMS: CPT

## 2025-04-16 NOTE — ADDENDUM
"Subjective   Patient ID: Gauri Sharpe is a 76 y.o. female. They present today with a chief complaint of Sinus Problem (1 week) and Earache (Right ear pain).    History of Present Illness  Patient is a pleasant 86-year-old white female, no significant past medical history, presented to clinic with complaint of increasing right ear pain.  Patient reporting approximately 1 week history of upper respiratory congestion rhinorrhea facial pain and pressure and 2 to 3-day history of increasing right ear pain.  Denies any ear drainage.  No fever or chills.  No chest pain or shortness of breath.  No abdominal pain, nausea, vomiting.  No numbness tingling or focal weakness.      Sinus Problem  Associated symptoms: ear pain    Earache         Past Medical History  Allergies as of 04/16/2025 - Reviewed 04/16/2025   Allergen Reaction Noted    Influenza virus vaccines Other 09/09/2023    Adhesive tape-silicones Rash 09/30/2011       Prescriptions Prior to Admission[1]       Medical History[2]    Surgical History[3]     reports that she has never smoked. She has never used smokeless tobacco. She reports that she does not currently use alcohol. She reports that she does not use drugs.    Review of Systems  Review of Systems   HENT:  Positive for ear pain.                                   Objective    Vitals:    04/16/25 1528   BP: 128/80   BP Location: Left arm   Patient Position: Sitting   Pulse: 79   Resp: 16   Temp: 36.5 °C (97.7 °F)   TempSrc: Oral   SpO2: 97%   Weight: 90.7 kg (200 lb)   Height: 1.676 m (5' 6\")     No LMP recorded. Patient is postmenopausal.    Physical Exam    General: Vitals Noted. No distress. Normocephalic.     HEENT: Left TM is within normal limits with adequate light reflex and visible landmarks.  right TM appears markedly erythematous and bulging with exudative air-fluid levels.  Positive preauricular tenderness.  No drainage.  EOMI, normal conjunctiva, patent nares, Normal OP    Neck: Supple with " [FreeTextEntry1] : I, Rajat Monsalve, was present for the entire visit, including the history taking, exam, and discussion of further evaluation and management. All questions answered. I agree with the note written by my PA, Ms. Breaux, in its entirety. no adenopathy.     Cardiac: Regular Rate and Rhythm. No murmur.     Pulmonary: Equal breath sounds bilaterally. No wheezes, rhonchi, or rales.    Abdomen: Soft, non-tender, with normal bowel sounds.     Musculoskeletal: Moves all extremities, no effusion, no edema.     Skin: No obvious rashes.  Procedures    Point of Care Test & Imaging Results from this visit    Imaging  No results found.    Cardiology, Vascular, and Other Imaging  No other imaging results found for the past 2 days      Diagnostic study results (if any) were reviewed by [facility].    Assessment/Plan   Allergies, medications, history, and pertinent labs/EKGs/Imaging reviewed by Paco Ross PA-C.     Medical Decision Making  Patient was seen and evaluated the clinic for chief complaint of ear pain.  On exam patient is nontoxic very well-appearing resting comfortably no acute distress.  Vital signs are stable, afebrile.  Chest is clear, heart is regular, belly is soft and nontender.  Evaluation of right TM as above concerning for an acute otitis media.  Mastoids are nontender therefore minimal concern for acute mastoiditis.  Minimal concern for acute otitis externa.  No concern for acute sinusitis.  Will treat patient with amoxicillin twice daily x 10 days with instruction to follow with her primary care physician in the next week.  Will be discharged home at this time.  Advised Tylenol ibuprofen as needed for pain.  I reviewed my impression, plan, strict return precautions with the patient.  She expresses understanding and agreement plan of care.      Orders and Diagnoses  There are no diagnoses linked to this encounter.      Medical Admin Record      Follow Up Instructions  No follow-ups on file.    Patient disposition: Home    Electronically signed by [facility]  3:45 PM         [1] (Not in a hospital admission)  [2]   Past Medical History:  Diagnosis Date    Acute infective polyneuritis (Multi) 04/09/2024    Formatting of  this note might be different from the original. Guillain s/p uri- paralyzed neck down- rehab for a few mo    Asymptomatic menopausal state     Menopause    Depressive disorder 12/19/2008    Eczema 10/22/2014    Elevated glucose 09/09/2023    Gastric ulcer 04/09/2024    1972 1972    Hemorrhoids 09/09/2023    Hyperlipidemia, unspecified 08/30/2020    Hyperlipidemia    Lateral epicondylitis 06/27/2010    Mucoid cyst, joint 01/17/2012    Pain in limb 03/03/2009    Pancreatitis ([facility] 04/09/2024    Personal history of other diseases of the digestive system 07/18/2014    History of irritable bowel syndrome    Personal history of other diseases of the nervous system and sense organs     History of Guillain-Dover syndrome    Personal history of other diseases of the nervous system and sense organs     History of migraine    Personal history of other endocrine, nutritional and metabolic disease     History of hypothyroidism    Personal history of other specified conditions     History of pituitary tumor    Unspecified contact dermatitis, unspecified cause 10/04/2024    Unspecified fall, initial encounter 03/28/2016    Fall at home    Unspecified multiple injuries, initial encounter 03/28/2016    Multiple contusions   [3]   Past Surgical History:  Procedure Laterality Date    CHOLECYSTECTOMY  03/24/2014    Cholecystectomy    COLONOSCOPY  03/24/2014    Complete Colonoscopy    OTHER SURGICAL HISTORY  03/24/2014    Neuroendosc Dissect Adhesion Excise Pituitary Tumor    OTHER SURGICAL HISTORY  03/31/2015    Gastric Surgery For Morbid Obesity Laparoscopic Longitudinal Gastrectomy    TONSILLECTOMY  03/24/2014    Tonsillectomy    TOTAL HIP ARTHROPLASTY Left 11/29/2023    TOTAL KNEE ARTHROPLASTY  03/31/2015    Knee Replacement

## 2025-04-23 ENCOUNTER — APPOINTMENT (OUTPATIENT)
Dept: HEART AND VASCULAR | Facility: CLINIC | Age: 70
End: 2025-04-23
Payer: MEDICARE

## 2025-04-23 ENCOUNTER — NON-APPOINTMENT (OUTPATIENT)
Age: 70
End: 2025-04-23

## 2025-04-23 PROCEDURE — 93298 REM INTERROG DEV EVAL SCRMS: CPT

## 2025-05-07 ENCOUNTER — APPOINTMENT (OUTPATIENT)
Dept: HEART AND VASCULAR | Facility: CLINIC | Age: 70
End: 2025-05-07

## 2025-06-11 ENCOUNTER — APPOINTMENT (OUTPATIENT)
Dept: HEART AND VASCULAR | Facility: CLINIC | Age: 70
End: 2025-06-11
Payer: MEDICARE

## 2025-06-11 ENCOUNTER — NON-APPOINTMENT (OUTPATIENT)
Age: 70
End: 2025-06-11

## 2025-06-11 PROCEDURE — 93298 REM INTERROG DEV EVAL SCRMS: CPT

## 2025-07-16 ENCOUNTER — APPOINTMENT (OUTPATIENT)
Dept: HEART AND VASCULAR | Facility: CLINIC | Age: 70
End: 2025-07-16
Payer: MEDICARE

## 2025-07-16 ENCOUNTER — NON-APPOINTMENT (OUTPATIENT)
Age: 70
End: 2025-07-16

## 2025-07-16 PROCEDURE — 93298 REM INTERROG DEV EVAL SCRMS: CPT
